# Patient Record
Sex: MALE | Race: WHITE | NOT HISPANIC OR LATINO | Employment: FULL TIME | URBAN - METROPOLITAN AREA
[De-identification: names, ages, dates, MRNs, and addresses within clinical notes are randomized per-mention and may not be internally consistent; named-entity substitution may affect disease eponyms.]

---

## 2023-03-24 ENCOUNTER — DOCUMENTATION (OUTPATIENT)
Dept: CARE COORDINATION | Facility: CLINIC | Age: 58
End: 2023-03-24
Payer: COMMERCIAL

## 2023-03-27 ENCOUNTER — PATIENT OUTREACH (OUTPATIENT)
Dept: CARE COORDINATION | Facility: CLINIC | Age: 58
End: 2023-03-27
Payer: COMMERCIAL

## 2023-03-27 NOTE — PROGRESS NOTES
Called to see if patient would like to reschedule missed zoom milestones visit one last Friday. He apologized as he had forgotten and we rescheduled for this Friday.

## 2023-03-29 ENCOUNTER — TELEMEDICINE (OUTPATIENT)
Dept: PHARMACY | Facility: HOSPITAL | Age: 58
End: 2023-03-29
Payer: COMMERCIAL

## 2023-03-29 DIAGNOSIS — E11.9 TYPE 2 DIABETES MELLITUS WITHOUT COMPLICATION, WITHOUT LONG-TERM CURRENT USE OF INSULIN (MULTI): Primary | ICD-10-CM

## 2023-03-29 DIAGNOSIS — E11.9 TYPE 2 DIABETES MELLITUS WITHOUT COMPLICATION, WITHOUT LONG-TERM CURRENT USE OF INSULIN (MULTI): ICD-10-CM

## 2023-03-29 RX ORDER — NAPROXEN SODIUM 220 MG
220 TABLET ORAL 3 TIMES DAILY PRN
COMMUNITY

## 2023-03-29 RX ORDER — OLMESARTAN MEDOXOMIL AND HYDROCHLOROTHIAZIDE 40/25 40; 25 MG/1; MG/1
1 TABLET ORAL DAILY
COMMUNITY
Start: 2023-03-24 | End: 2023-06-20 | Stop reason: SDUPTHER

## 2023-03-29 RX ORDER — FLASH GLUCOSE SENSOR
KIT MISCELLANEOUS
Qty: 6 EACH | Refills: 1 | Status: SHIPPED | OUTPATIENT
Start: 2023-03-29 | End: 2023-04-25 | Stop reason: SDUPTHER

## 2023-03-29 RX ORDER — METFORMIN HYDROCHLORIDE 500 MG/1
1000 TABLET, EXTENDED RELEASE ORAL
COMMUNITY
Start: 2023-03-21 | End: 2023-05-09 | Stop reason: SDUPTHER

## 2023-03-29 ASSESSMENT — ENCOUNTER SYMPTOMS: DIABETIC ASSOCIATED SYMPTOMS: 0

## 2023-03-29 NOTE — PROGRESS NOTES
Patient is sent at the request of Chanda Munoz DO for my opinion regarding Type 2 diabetes.  My final recommendations will be communicated back to the requesting provider by way of shared medical record.    Subjective   Diabetes  He presents for his follow-up diabetic visit. He has type 2 diabetes mellitus. The initial diagnosis of diabetes was made 5 years ago. His disease course has been improving. There are no hypoglycemic associated symptoms. There are no diabetic associated symptoms. There are no hypoglycemic complications. There are no diabetic complications. Risk factors for coronary artery disease include dyslipidemia, diabetes mellitus, hypertension, male sex and tobacco exposure. He is compliant with treatment all of the time. An ACE inhibitor/angiotensin II receptor blocker is being taken. He does not see a podiatrist.Eye exam is not current.       Allergies   Allergen Reactions    Animal Dander Unknown    Lisinopril Cough     No issues reported in regards to accessibility affordability adherence organization.    Adverse effects: Patient reports he is tolerating Mounjaro fairly well. He gets some stomach upset the few days after he gives his weekly dose on Sunday but this goes away by mid week. He also states his appetite has decreased and he experiences increased satiety. He reports no other adverse effects.     MEDICATION RECONCILIATION  Medication list was confirmed to be accurate by patient, no changes were made.       [facility] - [place], [place] - [address]  [address]  [facility] [address]  Phone: [phone] Fax: [phone]    Current Outpatient Medications on File Prior to Visit   Medication Sig Dispense Refill    cannabidiol, CBD, (CANNABIDIOL ORAL) Take 1 CBD gummy by mouth as needed for pain      cinnamon bark (CINNAMON ORAL) Take 1 tablespoon with breakfast once daily      metFORMIN XR (Glucophage-XR) 500 mg 24 hr tablet Take 2 tablets (1,000 mg) by mouth  once daily in the evening. Take with meals.      naproxen sodium (Aleve) 220 mg tablet Take 1 tablet (220 mg) by mouth 3 times a day as needed for mild pain (1 - 3).      olmesartan-hydrochlorothiazide (BENIcar HCT) 40-25 mg tablet Take 1 tablet by mouth once daily.       No current facility-administered medications on file prior to visit.     Diabetes Pharmacotherapy:    Mounjaro 2.5 mg once weekly   Metformin  mg 2 tablets daily     SECONDARY PREVENTION  - Statin? No  - ACE-I/ARB? Yes - Olmesartan-hydrochlorothiazide 40-25 mg   - Aspirin? No    Current monitoring regimen:   Patient is using: continuous glucose monitor        Any episodes of hypoglycemia? no    Objective     Pertinent PMH Review:  - PMH of Pancreatitis: No  - PMH of Retinopathy: No  - PMH of Urinary Tract Infections: No    Lab Review  Lab Results   Component Value Date    BILITOT 0.6 02/15/2023    CALCIUM 9.2 02/15/2023    CO2 28 02/15/2023     02/15/2023    CREATININE 0.66 02/15/2023    GLUCOSE 271 (H) 02/15/2023    ALKPHOS 89 02/15/2023    K 3.7 02/15/2023    PROT 7.2 02/15/2023     (L) 02/15/2023    AST 17 02/15/2023    ALT 20 02/15/2023    BUN 14 02/15/2023    ANIONGAP 11 02/15/2023    ALBUMIN 4.4 02/15/2023    GFRMALE >90 02/15/2023     Lab Results   Component Value Date    TRIG 167 (H) 02/15/2023    CHOL 232 (H) 02/15/2023    HDL 33.8 (A) 02/15/2023     Lab Results   Component Value Date    HGBA1C 10.9 (A) 02/15/2023    HGBA1C 8.0 04/11/2018     The 10-year ASCVD risk score (Eleuterio KO, et al., 2019) is: 28.6%    Values used to calculate the score:      Age: 58 years      Sex: Male      Is Non- : No      Diabetic: Yes      Tobacco smoker: No      Systolic Blood Pressure: 138 mmHg      Is BP treated: Yes      HDL Cholesterol: 33.8 mg/dL      Total Cholesterol: 232 mg/dL    Health Maintenance:   Foot Exam: Unknown  Eye Exam: Not current, patient states he will call to schedule an appointment  Lipid  Panel: 02/15/2023  Urine Albumin: None    Drug Interactions:  No significant drug-drug interactions exist that require adjustment to therapy    Assessment/Plan     Diagnoses and all orders for this visit:  Type 2 diabetes mellitus without complication, without long-term current use of insulin (CMS/[facility])  -     Follow Up In Clinical Pharmacy  -     FreeStyle Jacqueline sensor system (FreeStyle Jacqueline 2 Sensor) kit; Apply 1 sensor to the back of the arm every 14 days to monitor blood sugar  -     tirzepatide 5 mg/0.5 mL pen injector; Inject 5 mg under the skin 1 (one) time per week.    Type 2 diabetes mellitus, is not at goal but improving as evidenced by his most recent A1c of 10.9% on 02/15/2023 and his time in range from his Jacqueline report at 73% over the last 4 weeks.     RX changes:   CHANGE: Mounjaro 5 mg under the skin once weekly (increased from 2.5 mg once weekly)  CONTINUE: Metformin  mg 2 tablets daily   Education:  blood sugar goals and self-monitoring of blood glucose skills  Compliance at present is estimated to be excellent.   Patient will schedule an appointment for an eye exam   FUTURE CONSIDERATION: Patient's ASCVD 10-year risk estimate is 28.6% which is considered high risk. He would be indicated for at least moderate-intensity statin therapy due to his diagnosis of diabetes and could be considered for high-intensity therapy due to his ASCVD risk estimate.     Pharmacy Follow Up: 1 month, 04/25/23  PCP Follow Up: 05/01/23    Ej Pulido, PharmD   [facility] PGY-1 Pharmacy

## 2023-03-31 ENCOUNTER — TELEPHONE (OUTPATIENT)
Dept: CARE COORDINATION | Facility: CLINIC | Age: 58
End: 2023-03-31
Payer: COMMERCIAL

## 2023-03-31 ENCOUNTER — TELEPHONE (OUTPATIENT)
Dept: PRIMARY CARE | Facility: CLINIC | Age: 58
End: 2023-03-31
Payer: COMMERCIAL

## 2023-03-31 ENCOUNTER — PATIENT OUTREACH (OUTPATIENT)
Dept: CARE COORDINATION | Facility: CLINIC | Age: 58
End: 2023-03-31
Payer: COMMERCIAL

## 2023-03-31 NOTE — TELEPHONE ENCOUNTER
Patient called for Diabetes Milestones initial consult, unable to have consult because he was called into work, rescheduled for next Friday

## 2023-03-31 NOTE — PROGRESS NOTES
Patient NS for Milestones Zoom visit today. Resent link to email and called LVM. Juan called back, apologized, and states he had to work today and will need to reschedule for next Friday. Let him know that is fine and got him rescheduled with new date and time for next Friday.

## 2023-04-04 NOTE — PROGRESS NOTES
I reviewed the progress note and agree with the resident’s findings and plans as written. Case discussed with resident.    Dwayne Raman, PharmD

## 2023-04-07 ENCOUNTER — PATIENT OUTREACH (OUTPATIENT)
Dept: CARE COORDINATION | Facility: CLINIC | Age: 58
End: 2023-04-07
Payer: COMMERCIAL

## 2023-04-07 NOTE — PROGRESS NOTES
Juan was not on our scheduled Milestones call today and reached out to him to see if he was able to join the call. He states he is not able to at this time and we rescheduled for Weds April 19 at 9:30.

## 2023-04-19 ENCOUNTER — PATIENT OUTREACH (OUTPATIENT)
Dept: CARE COORDINATION | Facility: CLINIC | Age: 58
End: 2023-04-19
Payer: COMMERCIAL

## 2023-04-25 ENCOUNTER — TELEMEDICINE (OUTPATIENT)
Dept: PHARMACY | Facility: HOSPITAL | Age: 58
End: 2023-04-25
Payer: COMMERCIAL

## 2023-04-25 DIAGNOSIS — E11.9 TYPE 2 DIABETES MELLITUS WITHOUT COMPLICATION, WITHOUT LONG-TERM CURRENT USE OF INSULIN (MULTI): ICD-10-CM

## 2023-04-25 RX ORDER — FLASH GLUCOSE SENSOR
KIT MISCELLANEOUS
Qty: 2 EACH | Refills: 11 | Status: SHIPPED | OUTPATIENT
Start: 2023-04-25 | End: 2023-06-06 | Stop reason: SDUPTHER

## 2023-04-25 RX ORDER — ROSUVASTATIN CALCIUM 10 MG/1
10 TABLET, COATED ORAL DAILY
Qty: 90 TABLET | Refills: 1 | Status: SHIPPED | OUTPATIENT
Start: 2023-04-25 | End: 2023-04-25

## 2023-04-25 ASSESSMENT — ENCOUNTER SYMPTOMS: DIABETIC ASSOCIATED SYMPTOMS: 0

## 2023-04-25 NOTE — ASSESSMENT & PLAN NOTE
Continue Mounjaro 5mg weekly and metformin 1000mg daily  START rosuvastatin 10mg daily  Continue monitoring blood sugars with Jacqueline device. Call company at [phone] if sensor malfunctions. Try requesting extra adhesives too or can buy some to keep sensor on for full 14 days  Try additional snack or two throughout the day to see if helps with heartburn  A1c due mid-May. Ordered today, instructed to complete when convenient  Repeat lipid panel in ~3 months

## 2023-04-25 NOTE — PROGRESS NOTES
Subjective   Patient ID: Kuldeep Kemp is a 58 y.o. male who presents for Diabetes.  Diabetes  He presents for his follow-up diabetic visit. He has type 2 diabetes mellitus. His disease course has been improving. There are no hypoglycemic associated symptoms. There are no diabetic associated symptoms. Current diabetic treatment includes oral agent (monotherapy) (and Mounjaro). He is compliant with treatment all of the time. He is following a generally healthy diet. He participates in exercise every other day. An ACE inhibitor/angiotensin II receptor blocker is being taken.     -Reporting Jacqueline issue, one of the sensors didn't work. Having trouble keeping sensor on for full 14 days.    -Patient reports tolerating increased Mounjaro pretty well. Having a little bit of heartburn and upset stomach. Heartburn more consistent and patient thinks due to empty stomach more often. Patient reports upset stomach only for a couple days after injection    -Patient reports not currently wearing Jacqueline sensor. Report from 3/16/23-4/12/23 shows TIR 97%, high 3%, average glucose 124mg/dL, and glucose management indicator 6.3%.        Referring Provider: Chanda Munoz, DO     Objective     There were no vitals taken for this visit.     LAB  Lab Results   Component Value Date    BILITOT 0.6 02/15/2023    CALCIUM 9.2 02/15/2023    CO2 28 02/15/2023     02/15/2023    CREATININE 0.66 02/15/2023    GLUCOSE 271 (H) 02/15/2023    ALKPHOS 89 02/15/2023    K 3.7 02/15/2023    PROT 7.2 02/15/2023     (L) 02/15/2023    AST 17 02/15/2023    ALT 20 02/15/2023    BUN 14 02/15/2023    ANIONGAP 11 02/15/2023    ALBUMIN 4.4 02/15/2023    GFRMALE >90 02/15/2023     Lab Results   Component Value Date    TRIG 167 (H) 02/15/2023    CHOL 232 (H) 02/15/2023    HDL 33.8 (A) 02/15/2023     Lab Results   Component Value Date    HGBA1C 10.9 (A) 02/15/2023       Assessment/Plan   Problem List Items Addressed This Visit          Endocrine/Metabolic     Diabetes mellitus (CMS/[facility])     Continue Mounjaro 5mg weekly and metformin 1000mg daily  START rosuvastatin 10mg daily  Continue monitoring blood sugars with Jacqueline device. Call company at [phone] if sensor malfunctions. Try requesting extra adhesives too or can buy some to keep sensor on for full 14 days  Try additional snack or two throughout the day to see if helps with heartburn  A1c due mid-May. Ordered today, instructed to complete when convenient  Repeat lipid panel in ~3 months         Relevant Medications    rosuvastatin (Crestor) 10 mg tablet    FreeStyle Jacqueline sensor system (FreeStyle Jacqueline 2 Sensor) kit    tirzepatide 5 mg/0.5 mL pen injector     PCP follow-up: 5/1/23  Pharmacy follow-up: 5/23/23 (virtual)    Laila Hong PharmD [facility] PGY1 Resident    Verbal consent to manage patient's drug therapy was obtained from the patient. They were informed they may decline to participate or withdraw from participation in pharmacy services at any time.

## 2023-05-01 ENCOUNTER — OFFICE VISIT (OUTPATIENT)
Dept: PRIMARY CARE | Facility: CLINIC | Age: 58
End: 2023-05-01
Payer: COMMERCIAL

## 2023-05-01 VITALS
SYSTOLIC BLOOD PRESSURE: 140 MMHG | DIASTOLIC BLOOD PRESSURE: 80 MMHG | TEMPERATURE: 97.3 F | BODY MASS INDEX: 27.28 KG/M2 | WEIGHT: 169 LBS

## 2023-05-01 DIAGNOSIS — R53.83 OTHER FATIGUE: Primary | ICD-10-CM

## 2023-05-01 DIAGNOSIS — E11.9 TYPE 2 DIABETES MELLITUS WITHOUT COMPLICATION, UNSPECIFIED WHETHER LONG TERM INSULIN USE (MULTI): ICD-10-CM

## 2023-05-01 PROCEDURE — 3046F HEMOGLOBIN A1C LEVEL >9.0%: CPT | Performed by: FAMILY MEDICINE

## 2023-05-01 PROCEDURE — 3079F DIAST BP 80-89 MM HG: CPT | Performed by: FAMILY MEDICINE

## 2023-05-01 PROCEDURE — 99213 OFFICE O/P EST LOW 20 MIN: CPT | Performed by: FAMILY MEDICINE

## 2023-05-01 PROCEDURE — 3077F SYST BP >= 140 MM HG: CPT | Performed by: FAMILY MEDICINE

## 2023-05-01 ASSESSMENT — ENCOUNTER SYMPTOMS
FATIGUE: 1
CARDIOVASCULAR NEGATIVE: 1
RESPIRATORY NEGATIVE: 1
FEVER: 0

## 2023-05-01 NOTE — PROGRESS NOTES
Subjective   Patient ID: Kuldeep Kemp is a 58 y.o. male who presents for No chief complaint on file..    Pt presents for follow up:    DM  Following with pharm  BG is 122 currently  His range, 100 percent of WNL the last 24 hours, this has been the norm x 2 months    Reduced appetite  No diarrhea on Metformin    Feels fatigued  Feels able to do little after work bc of the level of fatigue         Review of Systems   Constitutional:  Positive for fatigue. Negative for fever.   Respiratory: Negative.     Cardiovascular: Negative.        Objective   There were no vitals taken for this visit.    Physical Exam  Constitutional:       Appearance: Normal appearance.   Cardiovascular:      Rate and Rhythm: Normal rate and regular rhythm.      Pulses: Normal pulses.      Heart sounds: Normal heart sounds.   Pulmonary:      Effort: Pulmonary effort is normal.      Breath sounds: Normal breath sounds.   Abdominal:      General: Abdomen is flat. Bowel sounds are normal.      Palpations: Abdomen is soft.   Musculoskeletal:      Right lower leg: No edema.      Left lower leg: No edema.   Skin:     General: Skin is warm and dry.   Neurological:      Mental Status: He is alert.         Assessment/Plan   Diagnoses and all orders for this visit:  Other fatigue  -     Testosterone, total and free; Future  -     Comprehensive metabolic panel; Future  -     Tsh With Reflex To Free T4 If Abnormal; Future  -     CBC; Future  Type 2 diabetes mellitus without complication, unspecified whether long term insulin use (CMS/[facility])  -     Comprehensive metabolic panel; Future    Following with pharm, next appt in May   Follow up appt in 3 months  Advised pt to call sooner with any questions or concerns   Chanda Munoz, DO

## 2023-05-03 ENCOUNTER — LAB (OUTPATIENT)
Dept: LAB | Facility: LAB | Age: 58
End: 2023-05-03
Payer: COMMERCIAL

## 2023-05-03 DIAGNOSIS — E11.9 TYPE 2 DIABETES MELLITUS WITHOUT COMPLICATION, WITHOUT LONG-TERM CURRENT USE OF INSULIN (MULTI): ICD-10-CM

## 2023-05-03 DIAGNOSIS — E11.9 TYPE 2 DIABETES MELLITUS WITHOUT COMPLICATION, UNSPECIFIED WHETHER LONG TERM INSULIN USE (MULTI): ICD-10-CM

## 2023-05-03 DIAGNOSIS — R53.83 OTHER FATIGUE: ICD-10-CM

## 2023-05-03 LAB
ALANINE AMINOTRANSFERASE (SGPT) (U/L) IN SER/PLAS: 15 U/L (ref 10–52)
ALBUMIN (G/DL) IN SER/PLAS: 4.8 G/DL (ref 3.4–5)
ALKALINE PHOSPHATASE (U/L) IN SER/PLAS: 69 U/L (ref 33–120)
ANION GAP IN SER/PLAS: 13 MMOL/L (ref 10–20)
ASPARTATE AMINOTRANSFERASE (SGOT) (U/L) IN SER/PLAS: 16 U/L (ref 9–39)
BILIRUBIN TOTAL (MG/DL) IN SER/PLAS: 0.7 MG/DL (ref 0–1.2)
CALCIUM (MG/DL) IN SER/PLAS: 9.3 MG/DL (ref 8.6–10.3)
CARBON DIOXIDE, TOTAL (MMOL/L) IN SER/PLAS: 25 MMOL/L (ref 21–32)
CHLORIDE (MMOL/L) IN SER/PLAS: 103 MMOL/L (ref 98–107)
CREATININE (MG/DL) IN SER/PLAS: 0.71 MG/DL (ref 0.5–1.3)
ERYTHROCYTE DISTRIBUTION WIDTH (RATIO) BY AUTOMATED COUNT: 12.6 % (ref 11.5–14.5)
ERYTHROCYTE MEAN CORPUSCULAR HEMOGLOBIN CONCENTRATION (G/DL) BY AUTOMATED: 35.3 G/DL (ref 32–36)
ERYTHROCYTE MEAN CORPUSCULAR VOLUME (FL) BY AUTOMATED COUNT: 89 FL (ref 80–100)
ERYTHROCYTES (10*6/UL) IN BLOOD BY AUTOMATED COUNT: 4.69 X10E12/L (ref 4.5–5.9)
ESTIMATED AVERAGE GLUCOSE FOR HBA1C: 137 MG/DL
GFR MALE: >90 ML/MIN/1.73M2
GLUCOSE (MG/DL) IN SER/PLAS: 90 MG/DL (ref 74–99)
HEMATOCRIT (%) IN BLOOD BY AUTOMATED COUNT: 41.7 % (ref 41–52)
HEMOGLOBIN (G/DL) IN BLOOD: 14.7 G/DL (ref 13.5–17.5)
HEMOGLOBIN A1C/HEMOGLOBIN TOTAL IN BLOOD: 6.4 %
LEUKOCYTES (10*3/UL) IN BLOOD BY AUTOMATED COUNT: 6.3 X10E9/L (ref 4.4–11.3)
PLATELETS (10*3/UL) IN BLOOD AUTOMATED COUNT: 302 X10E9/L (ref 150–450)
POTASSIUM (MMOL/L) IN SER/PLAS: 3.4 MMOL/L (ref 3.5–5.3)
PROTEIN TOTAL: 7.4 G/DL (ref 6.4–8.2)
SODIUM (MMOL/L) IN SER/PLAS: 138 MMOL/L (ref 136–145)
THYROTROPIN (MIU/L) IN SER/PLAS BY DETECTION LIMIT <= 0.05 MIU/L: 3.24 MIU/L (ref 0.44–3.98)
UREA NITROGEN (MG/DL) IN SER/PLAS: 13 MG/DL (ref 6–23)

## 2023-05-03 PROCEDURE — 84443 ASSAY THYROID STIM HORMONE: CPT

## 2023-05-03 PROCEDURE — 84403 ASSAY OF TOTAL TESTOSTERONE: CPT

## 2023-05-03 PROCEDURE — 83036 HEMOGLOBIN GLYCOSYLATED A1C: CPT

## 2023-05-03 PROCEDURE — 85027 COMPLETE CBC AUTOMATED: CPT

## 2023-05-03 PROCEDURE — 84402 ASSAY OF FREE TESTOSTERONE: CPT

## 2023-05-03 PROCEDURE — 36415 COLL VENOUS BLD VENIPUNCTURE: CPT

## 2023-05-03 PROCEDURE — 80053 COMPREHEN METABOLIC PANEL: CPT

## 2023-05-04 ENCOUNTER — TELEPHONE (OUTPATIENT)
Dept: PRIMARY CARE | Facility: CLINIC | Age: 58
End: 2023-05-04
Payer: COMMERCIAL

## 2023-05-04 NOTE — TELEPHONE ENCOUNTER
Please congratulate patient, his A1c has decreased to 6.4!  Of note, his potassium is slightly low, I would recc he increase dietary sources of potassium such as spinach and bananas at this time,    Thank you,  Chanda Munoz, DO

## 2023-05-09 ENCOUNTER — OFFICE VISIT (OUTPATIENT)
Dept: PRIMARY CARE | Facility: CLINIC | Age: 58
End: 2023-05-09
Payer: COMMERCIAL

## 2023-05-09 VITALS — SYSTOLIC BLOOD PRESSURE: 140 MMHG | DIASTOLIC BLOOD PRESSURE: 80 MMHG

## 2023-05-09 DIAGNOSIS — H00.011 HORDEOLUM EXTERNUM OF RIGHT UPPER EYELID: ICD-10-CM

## 2023-05-09 DIAGNOSIS — E11.9 TYPE 2 DIABETES MELLITUS WITHOUT COMPLICATION, WITHOUT LONG-TERM CURRENT USE OF INSULIN (MULTI): ICD-10-CM

## 2023-05-09 DIAGNOSIS — L30.9 DERMATITIS: Primary | ICD-10-CM

## 2023-05-09 DIAGNOSIS — K21.9 GASTROESOPHAGEAL REFLUX DISEASE, UNSPECIFIED WHETHER ESOPHAGITIS PRESENT: ICD-10-CM

## 2023-05-09 PROCEDURE — 99214 OFFICE O/P EST MOD 30 MIN: CPT | Performed by: FAMILY MEDICINE

## 2023-05-09 PROCEDURE — 3044F HG A1C LEVEL LT 7.0%: CPT | Performed by: FAMILY MEDICINE

## 2023-05-09 PROCEDURE — 3079F DIAST BP 80-89 MM HG: CPT | Performed by: FAMILY MEDICINE

## 2023-05-09 PROCEDURE — 3077F SYST BP >= 140 MM HG: CPT | Performed by: FAMILY MEDICINE

## 2023-05-09 PROCEDURE — 1036F TOBACCO NON-USER: CPT | Performed by: FAMILY MEDICINE

## 2023-05-09 RX ORDER — METFORMIN HYDROCHLORIDE 500 MG/1
500 TABLET, EXTENDED RELEASE ORAL
Start: 2023-05-09

## 2023-05-09 RX ORDER — TRIAMCINOLONE ACETONIDE 1 MG/G
CREAM TOPICAL
Qty: 30 G | Refills: 0 | Status: SHIPPED | OUTPATIENT
Start: 2023-05-09 | End: 2023-12-14 | Stop reason: SDUPTHER

## 2023-05-09 ASSESSMENT — ENCOUNTER SYMPTOMS
EYE REDNESS: 1
PSYCHIATRIC NEGATIVE: 1
EYE PAIN: 0
ABDOMINAL PAIN: 1
EYE DISCHARGE: 0
EYE ITCHING: 0
CONSTITUTIONAL NEGATIVE: 1
ROS GI COMMENTS: HEARTBURN

## 2023-05-09 NOTE — PROGRESS NOTES
Subjective   Patient ID: Kuldeep Kemp is a 58 y.o. male who presents for Follow-up (Does not want to increase med dose with pharmacy. ) and Rash (X4 days, red bumps, itching. Has tried a cream. Was doing yard work on Saturday. ).    Pt presents for follow up:    Right eye, stye?    Bilateral forearms- red, itchy rash since doing yard work this past weekend    Has heartburn symptoms as well, wonders if this may be the Mounjaro?  Reviewed his improved A1C    Bilateral ankle pain, katrina at end of day  Takes Aleve         Review of Systems   Constitutional: Negative.    Eyes:  Positive for redness. Negative for pain, discharge and itching.        Right eyelid redness   Gastrointestinal:  Positive for abdominal pain.        Heartburn   Skin:  Positive for rash.   Psychiatric/Behavioral: Negative.         Objective   There were no vitals taken for this visit.    Physical Exam  Constitutional:       Appearance: Normal appearance.   Eyes:      General: No allergic shiner or visual field deficit.        Right eye: No foreign body or discharge.      Extraocular Movements: Extraocular movements intact.      Conjunctiva/sclera: Conjunctivae normal.        Comments: Right eyelid, stye appreciated   Central area of white head noted      Cardiovascular:      Rate and Rhythm: Normal rate and regular rhythm.      Heart sounds: Normal heart sounds.   Pulmonary:      Effort: Pulmonary effort is normal.      Breath sounds: Normal breath sounds.   Abdominal:      General: Abdomen is flat. Bowel sounds are normal.      Palpations: Abdomen is soft.   Skin:     Findings: Rash present. Rash is macular and papular. Rash is not pustular.      Comments: Bilateral forearms, red, maculopapular rash    Neurological:      Mental Status: He is alert.         Assessment/Plan   Diagnoses and all orders for this visit:  Dermatitis  -     triamcinolone (Kenalog) 0.1 % cream; Apply to affected area 1-2 times daily as needed. Avoid face and  groin.  Hordeolum externum of right upper eyelid  Type 2 diabetes mellitus without complication, without long-term current use of insulin (CMS/[facility])  -     metFORMIN XR (Glucophage-XR) 500 mg 24 hr tablet; Take 1 tablet (500 mg) by mouth once daily in the evening. Take with meals.  Gastroesophageal reflux disease, unspecified whether esophagitis present  Start Pepcid  Decrease use of Aleve, as this may be a contributing factor       Warm compresses to right eyelid, advised him to call us with any eye pain, or increased swelling and/or new or worsening symptoms     Start Zyrtec or Claritin for the itching    Follow up appt in 3 months  Advised pt to call sooner with any questions or concerns   Chanda Munoz, DO

## 2023-05-11 ENCOUNTER — TELEPHONE (OUTPATIENT)
Dept: PRIMARY CARE | Facility: CLINIC | Age: 58
End: 2023-05-11
Payer: COMMERCIAL

## 2023-05-11 LAB
TESTOSTERONE FREE (CHAN): 56.2 PG/ML (ref 35–155)
TESTOSTERONE,TOTAL,LC-MS/MS: 365 NG/DL (ref 250–1100)

## 2023-05-11 NOTE — TELEPHONE ENCOUNTER
Please notify pt of low/normal testosterone level. I would offer the option of a urology referral, Dr. Cl Arroyo, [phone].     Thank you,  Chanda Munoz, DO

## 2023-05-23 ENCOUNTER — TELEMEDICINE (OUTPATIENT)
Dept: PHARMACY | Facility: HOSPITAL | Age: 58
End: 2023-05-23
Payer: COMMERCIAL

## 2023-05-23 DIAGNOSIS — E11.9 TYPE 2 DIABETES MELLITUS WITHOUT COMPLICATION, WITHOUT LONG-TERM CURRENT USE OF INSULIN (MULTI): Primary | ICD-10-CM

## 2023-05-23 NOTE — PROGRESS NOTES
Subjective   Patient ID: Kuldeep Kemp is a 58 y.o. male who presents for Diabetes medication management.    Referring Provider: Chanda Munoz DO     Employee health diabetes maintenance.       Review of Systems   All other systems reviewed and are negative.      Objective     There were no vitals taken for this visit.     Labs  Lab Results   Component Value Date    BILITOT 0.7 05/03/2023    CALCIUM 9.3 05/03/2023    CO2 25 05/03/2023     05/03/2023    CREATININE 0.71 05/03/2023    GLUCOSE 90 05/03/2023    ALKPHOS 69 05/03/2023    K 3.4 (L) 05/03/2023    PROT 7.4 05/03/2023     05/03/2023    AST 16 05/03/2023    ALT 15 05/03/2023    BUN 13 05/03/2023    ANIONGAP 13 05/03/2023    ALBUMIN 4.8 05/03/2023    GFRMALE >90 05/03/2023     Lab Results   Component Value Date    TRIG 167 (H) 02/15/2023    CHOL 232 (H) 02/15/2023    HDL 33.8 (A) 02/15/2023     Lab Results   Component Value Date    HGBA1C 6.4 (A) 05/03/2023       Current Outpatient Medications on File Prior to Visit   Medication Sig Dispense Refill    cannabidiol, CBD, (CANNABIDIOL ORAL) Take 1 CBD gummy by mouth as needed for pain      cinnamon bark (CINNAMON ORAL) Take 1 tablespoon with breakfast once daily      FreeStyle Jacqueline sensor system (FreeStyle Jacqueline 2 Sensor) kit Apply 1 sensor to the back of the arm every 14 days to monitor blood sugar 2 each 11    metFORMIN XR (Glucophage-XR) 500 mg 24 hr tablet Take 1 tablet (500 mg) by mouth once daily in the evening. Take with meals.      naproxen sodium (Aleve) 220 mg tablet Take 1 tablet (220 mg) by mouth 3 times a day as needed for mild pain (1 - 3).      olmesartan-hydrochlorothiazide (BENIcar HCT) 40-25 mg tablet Take 1 tablet by mouth once daily.      rosuvastatin (Crestor) 10 mg tablet Take 1 tablet (10 mg) by mouth once daily. 90 tablet 1    tirzepatide 5 mg/0.5 mL pen injector Inject 5 mg under the skin 1 (one) time per week. 2 mL 2    triamcinolone (Kenalog) 0.1 % cream Apply to affected  area 1-2 times daily as needed. Avoid face and groin. 30 g 0     No current facility-administered medications on file prior to visit.        Assessment/Plan   Problem List Items Addressed This Visit          Endocrine/Metabolic    Diabetes mellitus (CMS/HCC) - Primary     Patient doing well, no major complaints. Patient's A1c reported to be 6.4% on 5/3/23 and is at goal <7.0%. Patient reports BG has also been at goal between . Patient's provider Dr. Munoz has already begun titrating off his metformin, patient is currently taking 500 mg once daily. Will continue to titrate off as tolerated to BG goals, follow up planned in 2 weeks to assess if metformin can be discontinued.     Patient does report some leg pain, hard to determine if this is musculoskeletal in nature or from statin due to recent exertions. Patient is tolerating pain, he does not state it is significant. Will keep statin at current dose for now, patient has instructions to monitor muscle pain and will reassess in 2 weeks. [facility] started statin at the end of [facility]. Will put in order for lipid panel due around mid July.       PLAN  Continue all meds under the continuation of care with the referring provider and clinical pharmacy team  Continue to monitor BG, will follow up in 2 weeks to assess viability to discontinue metformin  Monitor for rosuvastatin side effects, follow up in 2 weeks to assess              Aidan Pires PharmD    Continue all meds under the continuation of care with the referring provider and clinical pharmacy team.

## 2023-05-23 NOTE — ASSESSMENT & PLAN NOTE
Patient doing well, no major complaints. Patient's A1c reported to be 6.4% on 5/3/23 and is at goal <7.0%. Patient reports BG has also been at goal between . Patient's provider Dr. Munoz has already begun titrating off his metformin, patient is currently taking 500 mg once daily. Will continue to titrate off as tolerated to BG goals, follow up planned in 2 weeks to assess if metformin can be discontinued.     Patient does report some leg pain, hard to determine if this is musculoskeletal in nature or from statin due to recent exertions. Patient is tolerating pain, he does not state it is significant. Will keep statin at current dose for now, patient has instructions to monitor muscle pain and will reassess in 2 weeks. Patient's last lipid panel was back in Feburary, however started statin at the end of April. Will put in order for lipid panel due around mid July.       PLAN  Continue all meds under the continuation of care with the referring provider and clinical pharmacy team  Continue to monitor BG, will follow up in 2 weeks to assess viability to discontinue metformin  Monitor for rosuvastatin side effects, follow up in 2 weeks to assess

## 2023-05-30 DIAGNOSIS — E11.9 TYPE 2 DIABETES MELLITUS WITHOUT COMPLICATION, WITHOUT LONG-TERM CURRENT USE OF INSULIN (MULTI): ICD-10-CM

## 2023-05-31 ENCOUNTER — TELEPHONE (OUTPATIENT)
Dept: PRIMARY CARE | Facility: CLINIC | Age: 58
End: 2023-05-31
Payer: COMMERCIAL

## 2023-05-31 DIAGNOSIS — E11.9 TYPE 2 DIABETES MELLITUS WITHOUT COMPLICATION, WITHOUT LONG-TERM CURRENT USE OF INSULIN (MULTI): ICD-10-CM

## 2023-05-31 NOTE — TELEPHONE ENCOUNTER
Refill(s) requested for: Mounjaro (2.5mg/0.5mL)    Pharmacy:  [facility]  Pharmacy Address: [address] [place]    LR: 03/01/2023  LV: 05/09/2023  NV: 08/01/2023

## 2023-06-06 ENCOUNTER — TELEMEDICINE (OUTPATIENT)
Dept: PHARMACY | Facility: HOSPITAL | Age: 58
End: 2023-06-06
Payer: COMMERCIAL

## 2023-06-06 DIAGNOSIS — E11.9 TYPE 2 DIABETES MELLITUS WITHOUT COMPLICATION, WITHOUT LONG-TERM CURRENT USE OF INSULIN (MULTI): Primary | ICD-10-CM

## 2023-06-06 RX ORDER — FLASH GLUCOSE SENSOR
KIT MISCELLANEOUS
Qty: 2 EACH | Refills: 11 | Status: SHIPPED | OUTPATIENT
Start: 2023-06-06 | End: 2023-08-07 | Stop reason: SDUPTHER

## 2023-06-06 NOTE — ASSESSMENT & PLAN NOTE
Patient doing well, no major complaints since last visit. Patient did not have his FBG readings with him but reports his average was around 120 over the past week. This previous week he used his last CGM sensor, will send more to his pharmacy. Patient stated he has only taken a few doses of his metformin since last visit. Given his average sugars since last visit, will stop metformin for now to see if FBG remains at goal. Will reassess in 2 weeks to determine if metformin is needed.    PLAN  Stop metformin  Continue checking FBG, will send [employer] Jacqueline sensor for patient  Continue all meds under the continuation of care with the referring provider and clinical pharmacy team  Follow up in 2 weeks to assess FBG readings and determine if metformin can remain off

## 2023-06-06 NOTE — PROGRESS NOTES
Subjective   Patient ID: Kuldeep Kemp is a 58 y.o. male who presents for Diabetes medication management.    Referring Provider: Chanda Munoz DO     Employee health diabetes maintenance.       Review of Systems   All other systems reviewed and are negative.      Objective     There were no vitals taken for this visit.     Labs  Lab Results   Component Value Date    BILITOT 0.7 05/03/2023    CALCIUM 9.3 05/03/2023    CO2 25 05/03/2023     05/03/2023    CREATININE 0.71 05/03/2023    GLUCOSE 90 05/03/2023    ALKPHOS 69 05/03/2023    K 3.4 (L) 05/03/2023    PROT 7.4 05/03/2023     05/03/2023    AST 16 05/03/2023    ALT 15 05/03/2023    BUN 13 05/03/2023    ANIONGAP 13 05/03/2023    ALBUMIN 4.8 05/03/2023    GFRMALE >90 05/03/2023     Lab Results   Component Value Date    TRIG 167 (H) 02/15/2023    CHOL 232 (H) 02/15/2023    HDL 33.8 (A) 02/15/2023     Lab Results   Component Value Date    HGBA1C 6.4 (A) 05/03/2023       Current Outpatient Medications on File Prior to Visit   Medication Sig Dispense Refill    cannabidiol, CBD, (CANNABIDIOL ORAL) Take 1 CBD gummy by mouth as needed for pain      cinnamon bark (CINNAMON ORAL) Take 1 tablespoon with breakfast once daily      FreeStyle Jacqueline sensor system (FreeStyle Jacqueline 2 Sensor) kit Apply 1 sensor to the back of the arm every 14 days to monitor blood sugar 2 each 11    metFORMIN XR (Glucophage-XR) 500 mg 24 hr tablet Take 1 tablet (500 mg) by mouth once daily in the evening. Take with meals.      naproxen sodium (Aleve) 220 mg tablet Take 1 tablet (220 mg) by mouth 3 times a day as needed for mild pain (1 - 3).      olmesartan-hydrochlorothiazide (BENIcar HCT) 40-25 mg tablet Take 1 tablet by mouth once daily.      rosuvastatin (Crestor) 10 mg tablet Take 1 tablet (10 mg) by mouth once daily. 90 tablet 1    tirzepatide 5 mg/0.5 mL pen injector Inject 5 mg under the skin 1 (one) time per week. 2 mL 2    triamcinolone (Kenalog) 0.1 % cream Apply to affected  area 1-2 times daily as needed. Avoid face and groin. 30 g 0     No current facility-administered medications on file prior to visit.        Assessment/Plan   Problem List Items Addressed This Visit          Endocrine/Metabolic    Diabetes mellitus (CMS/HCC) - Primary     Patient doing well, no major complaints since last visit. Patient did not have his FBG readings with him but reports his average was around 120 over the past week. This previous week he used his last CGM sensor, will send more to his pharmacy. Patient stated he has only taken a few doses of his metformin since last visit. Given his average sugars since last visit, will stop metformin for now to see if FBG remains at goal. Will reassess in 2 weeks to determine if metformin is needed.    PLAN  Stop metformin  Continue checking FBG, will send Concur Japanyle Jacqueline sensor for patient  Continue all meds under the continuation of care with the referring provider and clinical pharmacy team  Follow up in 2 weeks to assess FBG readings and determine if metformin can remain off          Aidan Pires, PharmD  PGY-1 Pharmacy Resident  [email or web address]   P: [phone]     Continue all meds under the continuation of care with the referring provider and clinical pharmacy team.

## 2023-06-20 ENCOUNTER — TELEMEDICINE (OUTPATIENT)
Dept: PHARMACY | Facility: HOSPITAL | Age: 58
End: 2023-06-20
Payer: COMMERCIAL

## 2023-06-20 ENCOUNTER — APPOINTMENT (OUTPATIENT)
Dept: PHARMACY | Facility: HOSPITAL | Age: 58
End: 2023-06-20
Payer: COMMERCIAL

## 2023-06-20 DIAGNOSIS — E11.9 TYPE 2 DIABETES MELLITUS WITHOUT COMPLICATION, WITHOUT LONG-TERM CURRENT USE OF INSULIN (MULTI): Primary | ICD-10-CM

## 2023-06-20 RX ORDER — OLMESARTAN MEDOXOMIL AND HYDROCHLOROTHIAZIDE 40/25 40; 25 MG/1; MG/1
1 TABLET ORAL DAILY
Qty: 30 TABLET | Refills: 11 | Status: SHIPPED | OUTPATIENT
Start: 2023-06-20 | End: 2023-06-20

## 2023-06-20 NOTE — PROGRESS NOTES
Subjective   Patient ID: Kuldeep Kemp is a 58 y.o. male who presents for Diabetes medication management.    Referring Provider: Chanda Munoz DO     Employee health diabetes maintenance.       Review of Systems   All other systems reviewed and are negative.      Objective     There were no vitals taken for this visit.     Labs  Lab Results   Component Value Date    BILITOT 0.7 05/03/2023    CALCIUM 9.3 05/03/2023    CO2 25 05/03/2023     05/03/2023    CREATININE 0.71 05/03/2023    GLUCOSE 90 05/03/2023    ALKPHOS 69 05/03/2023    K 3.4 (L) 05/03/2023    PROT 7.4 05/03/2023     05/03/2023    AST 16 05/03/2023    ALT 15 05/03/2023    BUN 13 05/03/2023    ANIONGAP 13 05/03/2023    ALBUMIN 4.8 05/03/2023    GFRMALE >90 05/03/2023     Lab Results   Component Value Date    TRIG 167 (H) 02/15/2023    CHOL 232 (H) 02/15/2023    HDL 33.8 (A) 02/15/2023     Lab Results   Component Value Date    HGBA1C 6.4 (A) 05/03/2023       Current Outpatient Medications on File Prior to Visit   Medication Sig Dispense Refill    cannabidiol, CBD, (CANNABIDIOL ORAL) Take 1 CBD gummy by mouth as needed for pain      cinnamon bark (CINNAMON ORAL) Take 1 tablespoon with breakfast once daily      FreeStyle Jacqueline sensor system (FreeStyle Jacqueline 2 Sensor) kit Apply 1 sensor to the back of the arm every 14 days to monitor blood sugar 2 each 11    metFORMIN XR (Glucophage-XR) 500 mg 24 hr tablet Take 1 tablet (500 mg) by mouth once daily in the evening. Take with meals.      naproxen sodium (Aleve) 220 mg tablet Take 1 tablet (220 mg) by mouth 3 times a day as needed for mild pain (1 - 3).      olmesartan-hydrochlorothiazide (BENIcar HCT) 40-25 mg tablet Take 1 tablet by mouth once daily.      rosuvastatin (Crestor) 10 mg tablet Take 1 tablet (10 mg) by mouth once daily. 90 tablet 1    tirzepatide 5 mg/0.5 mL pen injector Inject 5 mg under the skin 1 (one) time per week. 2 mL 2    triamcinolone (Kenalog) 0.1 % cream Apply to affected  area 1-2 times daily as needed. Avoid face and groin. 30 g 0     No current facility-administered medications on file prior to visit.        Assessment/Plan   Problem List Items Addressed This Visit          Endocrine/Metabolic    Diabetes mellitus (CMS/HCC) - Primary     Patient doing well, no major complaints. As discussed at last virtual visit patient stopped metformin for a trial. Patient checked his FBG as requested but did not write the values down, reports his average FBG is within goal of  usually around 110. As patient's FBG is at goal and A1c is at goal <7.0%, will keep metformin off and continue Mounjaro. Patient not interested in continued weight loss, will stay on current dose. Next A1c due in August, will reassess then if metformin is needed or if Mounjaro needs to be increased. Patient stated he is out of his Olmesartan/hydrochlorothiazide, will send refill.     PLAN  Continue medications as prescribed  Schedule A1c for August, will reassess if more medications are needed at that point  Send refill for Olmesartan/hydrochlorothiazide 40/25 mg tablet 1 PO every day as previously prescribed by PCP  Follow up after A1c in August           Aidan Pires, PharmD  PGY-1 Pharmacy Resident  [email or web address]   P: [phone]     Continue all meds under the continuation of care with the referring provider and clinical pharmacy team.

## 2023-06-20 NOTE — PROGRESS NOTES
I reviewed the progress note and agree with the resident’s findings and plans as written. Case discussed with resident.    Stephen Azul, PharmD

## 2023-06-20 NOTE — ASSESSMENT & PLAN NOTE
Patient doing well, no major complaints. As discussed at last virtual visit patient stopped metformin for a trial. Patient checked his FBG as requested but did not write the values down, reports his average FBG is within goal of  usually around 110. As patient's FBG is at goal and A1c is at goal <7.0%, will keep metformin off and continue Mounjaro. Patient not interested in continued weight loss, will stay on current dose. Next A1c due in August, will reassess then if metformin is needed or if Mounjaro needs to be increased. Patient stated he is out of his Olmesartan/hydrochlorothiazide, will send refill.     PLAN  Continue medications as prescribed  Schedule A1c for August, will reassess if more medications are needed at that point  Send refill for Olmesartan/hydrochlorothiazide 40/25 mg tablet 1 PO every day as previously prescribed by PCP  Follow up after A1c in August

## 2023-07-06 DIAGNOSIS — E11.9 TYPE 2 DIABETES MELLITUS WITHOUT COMPLICATION, WITHOUT LONG-TERM CURRENT USE OF INSULIN (MULTI): ICD-10-CM

## 2023-07-06 NOTE — TELEPHONE ENCOUNTER
Patient left a voicemail stating that he has been out of his medication since Sunday and stated that he did not realize that he was going to be out of it.

## 2023-08-01 ENCOUNTER — APPOINTMENT (OUTPATIENT)
Dept: PRIMARY CARE | Facility: CLINIC | Age: 58
End: 2023-08-01
Payer: COMMERCIAL

## 2023-08-01 DIAGNOSIS — E11.9 TYPE 2 DIABETES MELLITUS WITHOUT COMPLICATION, WITHOUT LONG-TERM CURRENT USE OF INSULIN (MULTI): ICD-10-CM

## 2023-08-01 NOTE — TELEPHONE ENCOUNTER
Refill    Tirzepatide 5 mg/0.5ml pen injector, inject 5 mg under the skin 1 time per week    Pharm: [facility]    LR: 7/6/23 2ml 2 refill  LV:  6/20/23  NV: 8/7/23

## 2023-08-07 ENCOUNTER — OFFICE VISIT (OUTPATIENT)
Dept: PRIMARY CARE | Facility: CLINIC | Age: 58
End: 2023-08-07
Payer: COMMERCIAL

## 2023-08-07 VITALS — DIASTOLIC BLOOD PRESSURE: 92 MMHG | WEIGHT: 179 LBS | BODY MASS INDEX: 28.89 KG/M2 | SYSTOLIC BLOOD PRESSURE: 170 MMHG

## 2023-08-07 DIAGNOSIS — E11.9 TYPE 2 DIABETES MELLITUS WITHOUT COMPLICATION, WITHOUT LONG-TERM CURRENT USE OF INSULIN (MULTI): ICD-10-CM

## 2023-08-07 DIAGNOSIS — M54.6 ACUTE RIGHT-SIDED THORACIC BACK PAIN: Primary | ICD-10-CM

## 2023-08-07 DIAGNOSIS — I10 HYPERTENSION, UNSPECIFIED TYPE: ICD-10-CM

## 2023-08-07 PROCEDURE — 99213 OFFICE O/P EST LOW 20 MIN: CPT | Performed by: FAMILY MEDICINE

## 2023-08-07 PROCEDURE — 3080F DIAST BP >= 90 MM HG: CPT | Performed by: FAMILY MEDICINE

## 2023-08-07 PROCEDURE — 3044F HG A1C LEVEL LT 7.0%: CPT | Performed by: FAMILY MEDICINE

## 2023-08-07 PROCEDURE — 3077F SYST BP >= 140 MM HG: CPT | Performed by: FAMILY MEDICINE

## 2023-08-07 PROCEDURE — 1036F TOBACCO NON-USER: CPT | Performed by: FAMILY MEDICINE

## 2023-08-07 RX ORDER — FLASH GLUCOSE SENSOR
KIT MISCELLANEOUS
Qty: 2 EACH | Refills: 11 | Status: SHIPPED | OUTPATIENT
Start: 2023-08-07 | End: 2023-08-07

## 2023-08-07 RX ORDER — MELOXICAM 7.5 MG/1
7.5 TABLET ORAL DAILY
Qty: 20 TABLET | Refills: 1 | Status: SHIPPED | OUTPATIENT
Start: 2023-08-07 | End: 2023-08-07

## 2023-08-07 NOTE — PROGRESS NOTES
Subjective   Patient ID: Kuldeep Kemp is a 58 y.o. male who presents for Follow-up (Hurt his back at work last week Tuesday ).    Pt presents for follow up:     DM  Following with pharm    Mid Back Pain  X one week  No radiation down arms or legs   No loss of bowel or bladder function  This has occurred in the past  This time he was lifting a shelf and noted it         Review of Systems   Constitutional: Negative.    Musculoskeletal:  Positive for back pain.       Objective   BP (!) 170/92   Wt 81.2 kg (179 lb)   BMI 28.89 kg/m²     Physical Exam  Vitals and nursing note reviewed.   Constitutional:       Appearance: Normal appearance.   Cardiovascular:      Rate and Rhythm: Regular rhythm.      Pulses: Normal pulses.      Heart sounds: Normal heart sounds.   Pulmonary:      Effort: Pulmonary effort is normal.      Breath sounds: Normal breath sounds.   Musculoskeletal:      Thoracic back: Spasms and tenderness present. Decreased range of motion.      Lumbar back: Spasms and tenderness present.   Neurological:      General: No focal deficit present.      Mental Status: He is alert. Mental status is at baseline.      Gait: Gait normal.      Deep Tendon Reflexes: Reflexes normal.      Comments: MS 5/5 bilateral UE and LE         Assessment/Plan   Diagnoses and all orders for this visit:  Acute right-sided thoracic back pain  -     meloxicam (Mobic) 7.5 mg tablet; Take 1 tablet (7.5 mg) by mouth once daily.  -     Referral to Physical Therapy; Future  Type 2 diabetes mellitus without complication, without long-term current use of insulin (CMS/[facility])  -     FreeStyle Jacqueline sensor system (FreeStyle Jacqueline 2 Sensor) kit; Apply 1 sensor to the back of the arm every 14 days to monitor blood sugar  Hypertension, unspecified type    Following w/ pharm for his DM, has a follow up appt next week    BP is elevated in the office today, of note pt is in pain from his back   Will monitor BP as an outpatient    Chanda Munoz, DO

## 2023-08-08 ASSESSMENT — ENCOUNTER SYMPTOMS
BACK PAIN: 1
CONSTITUTIONAL NEGATIVE: 1

## 2023-08-15 ENCOUNTER — TELEMEDICINE (OUTPATIENT)
Dept: PHARMACY | Facility: HOSPITAL | Age: 58
End: 2023-08-15
Payer: COMMERCIAL

## 2023-08-15 DIAGNOSIS — E11.9 TYPE 2 DIABETES MELLITUS WITHOUT COMPLICATION, WITHOUT LONG-TERM CURRENT USE OF INSULIN (MULTI): Primary | ICD-10-CM

## 2023-08-15 NOTE — PROGRESS NOTES
I reviewed the progress note and agree with the resident’s findings and plans as written. Case discussed with resident.    Nii Campos, TarunD

## 2023-08-15 NOTE — PROGRESS NOTES
Kuldeep Kemp is a 58 y.o. male was referred to Clinical Pharmacy Team to complete a comprehensive medication review (CMR) with a pharmacist as part of the [employer] diabetes program.  The patient was referred for their Diabetes At last visit, no medication changes were made pending new A1c.    Referring Provider: Chanda Munoz, DO    Subjective   Allergies   Allergen Reactions    Animal Dander Unknown    Cat Dander Unknown    Lisinopril Cough       [facility] - [place], [place] - [address]  [address]  [address]  [facility] [address]  Phone: [phone] Fax: [phone]      Diabetes  He presents for his follow-up diabetic visit. He has type 2 diabetes mellitus. His disease course has been stable. There are no hypoglycemic associated symptoms. There are no hypoglycemic complications.       Objective     There were no vitals taken for this visit.     LAB  Lab Results   Component Value Date    BILITOT 0.7 05/03/2023    CALCIUM 9.3 05/03/2023    CO2 25 05/03/2023     05/03/2023    CREATININE 0.71 05/03/2023    GLUCOSE 90 05/03/2023    ALKPHOS 69 05/03/2023    K 3.4 (L) 05/03/2023    PROT 7.4 05/03/2023     05/03/2023    AST 16 05/03/2023    ALT 15 05/03/2023    BUN 13 05/03/2023    ANIONGAP 13 05/03/2023    ALBUMIN 4.8 05/03/2023    GFRMALE >90 05/03/2023     Lab Results   Component Value Date    TRIG 167 (H) 02/15/2023    CHOL 232 (H) 02/15/2023    HDL 33.8 (A) 02/15/2023     Lab Results   Component Value Date    HGBA1C 6.4 (A) 05/03/2023       Current Outpatient Medications on File Prior to Visit   Medication Sig Dispense Refill    cannabidiol, CBD, (CANNABIDIOL ORAL) Take 1 CBD gummy by mouth as needed for pain      cinnamon bark (CINNAMON ORAL) Take 1 tablespoon with breakfast once daily      FreeStyle Jacqueline sensor system (FreeStyle Jacqueline 2 Sensor) kit Apply 1 sensor to the back of the arm every 14 days to monitor blood sugar 2 each 11    meloxicam (Mobic) 7.5  mg tablet Take 1 tablet (7.5 mg) by mouth once daily. 20 tablet 1    metFORMIN XR (Glucophage-XR) 500 mg 24 hr tablet Take 1 tablet (500 mg) by mouth once daily in the evening. Take with meals.      naproxen sodium (Aleve) 220 mg tablet Take 1 tablet (220 mg) by mouth 3 times a day as needed for mild pain (1 - 3).      olmesartan-hydrochlorothiazide (BENIcar HCT) 40-25 mg tablet Take 1 tablet by mouth once daily. 30 tablet 11    rosuvastatin (Crestor) 10 mg tablet Take 1 tablet (10 mg) by mouth once daily. 90 tablet 1    tirzepatide 5 mg/0.5 mL pen injector Inject 5 mg under the skin 1 (one) time per week. 2 mL 2    triamcinolone (Kenalog) 0.1 % cream Apply to affected area 1-2 times daily as needed. Avoid face and groin. 30 g 0     No current facility-administered medications on file prior to visit.     Current Diabetes Pharmacotherapy  Mounjaro 5mg subcutaneously once weekly  Metformin XR 500mg once daily    Since Last Visit:  Patient resumed taking Metformin XR 500mg once daily on his PCP's recommendation    Home SMBG:  Overall average blood glucose 130's   Last 24 hour average: 118  Last 24 hour in target 100% of the time      SECONDARY PREVENTION  - Statin? yes; rosuvastatin 10mg  - ACE-I/ARB? yes; olmesartan-hydrochlorothiazide 40-25mg    Assessment/Plan   Problem List Items Addressed This Visit       Diabetes mellitus (CMS/HCC) - Primary     Patient is doing well and has no major complaints at this time. A1c is below goal at 6.4% (5/3/2023) (goal <7%).     Plan:  Continue medications as prescribed  Get updated A1c and lipid panel prior to next visit  Follow-up in September after labwork    Patient requested to have his medications delivered moving forward           Patient is amenable to increasing rosuvastatin, but wants to wait until he has updated lab work. Plan to increase rosuvastatin to 20mg at next visit.       Follow up: 9/12/2023 @9am    Yadira Guerrero, PharmD  PGY1  [facility]  Resident    Verbal consent to manage patient's drug therapy was obtained from the patient. They were informed they may decline to participate or withdraw from participation in pharmacy services at any time.

## 2023-08-15 NOTE — ASSESSMENT & PLAN NOTE
Patient is doing well and has no major complaints at this time. A1c is below goal at 6.4% (5/3/2023) (goal <7%).     Plan:  Continue medications as prescribed  Get updated A1c and lipid panel prior to next visit  Follow-up in September after labwork    Patient requested to have his medications delivered moving forward

## 2023-08-19 DIAGNOSIS — Z20.822 CLOSE EXPOSURE TO COVID-19 VIRUS: Primary | ICD-10-CM

## 2023-09-12 ENCOUNTER — APPOINTMENT (OUTPATIENT)
Dept: PHARMACY | Facility: HOSPITAL | Age: 58
End: 2023-09-12
Payer: COMMERCIAL

## 2023-09-19 ENCOUNTER — TELEMEDICINE (OUTPATIENT)
Dept: PHARMACY | Facility: HOSPITAL | Age: 58
End: 2023-09-19
Payer: COMMERCIAL

## 2023-09-19 DIAGNOSIS — E11.9 TYPE 2 DIABETES MELLITUS WITHOUT COMPLICATION, WITHOUT LONG-TERM CURRENT USE OF INSULIN (MULTI): Primary | ICD-10-CM

## 2023-09-19 ASSESSMENT — ENCOUNTER SYMPTOMS
BLURRED VISION: 0
POLYPHAGIA: 0
DIABETIC ASSOCIATED SYMPTOMS: 0
POLYDIPSIA: 0

## 2023-09-19 NOTE — ASSESSMENT & PLAN NOTE
RECOMMENDATIONS/PLAN  Patients diabetes is well controlled and stable with most recent A1c of 6.4% (5/3/2023) (goal < 7 %).   Continue all meds under the continuation of care with the referring provider and clinical pharmacy team.  Patient's diabetes is stable, and has needed no medication changes. Will follow up in 10 months for VBID re-enrollment

## 2023-09-19 NOTE — PROGRESS NOTES
Subjective   Patient ID: Kuldeep Kemp is a 58 y.o. male who was referred to Clinical Pharmacy Team to complete a comprehensive medication review (CMR) with a pharmacist as part of the [employer] diabetes program.  The patient was referred for their Diabetes At last visit, no medication changes were made pending new A1c and lipid labs.     Referring Provider: Chanda Munoz, DO     Diabetes  He presents for his follow-up diabetic visit. He has type 2 diabetes mellitus. His disease course has been stable. There are no hypoglycemic associated symptoms. There are no diabetic associated symptoms. Pertinent negatives for diabetes include no blurred vision, no polydipsia, no polyphagia and no polyuria. There are no hypoglycemic complications.     DIABETES ASSESSMENT    CURRENT PHARMACOTHERAPY  Metformin XR 500mg once daily  Mounjaro 5mg subcutaneously once weekly    SECONDARY PREVENTION  Statin? Yes; Rosuvastatin 10mg  ACE-I/ARB? Yes; Olmesartan-hydrochlorothiazide 40-25mg  Aspirin? No    Affordability/Accessibility: no issues reported   Adherence/Organization: no issues reported    SMBG MEASUREMENTS  Patient reports he has gotten very busy and has not wearing the randolph lately  Not checking blood sugar at all currently    Patient does not report symptoms of hypoglycemia. Patient denies confusion, dizziness, headache, hunger, and sweating.  Patient does not report symptoms of hyperglycemia. Patient denies blurry vision, excessive thirst, fatigue, and polyuria.    SIDE EFFECTS  None reported, patient denies GI upset, constipation, and nausea    DIET  No changes reported, however is doing more snacking      EXERCISE  Walks every day       Objective     There were no vitals taken for this visit.     Labs  Lab Results   Component Value Date    BILITOT 0.7 05/03/2023    CALCIUM 9.3 05/03/2023    CO2 25 05/03/2023     05/03/2023    CREATININE 0.71 05/03/2023    GLUCOSE 90 05/03/2023    ALKPHOS 69  05/03/2023    K 3.4 (L) 05/03/2023    PROT 7.4 05/03/2023     05/03/2023    AST 16 05/03/2023    ALT 15 05/03/2023    BUN 13 05/03/2023    ANIONGAP 13 05/03/2023    ALBUMIN 4.8 05/03/2023    GFRMALE >90 05/03/2023     Lab Results   Component Value Date    TRIG 167 (H) 02/15/2023    CHOL 232 (H) 02/15/2023    HDL 33.8 (A) 02/15/2023     Lab Results   Component Value Date    HGBA1C 6.4 (A) 05/03/2023       Current Outpatient Medications on File Prior to Visit   Medication Sig Dispense Refill    cannabidiol, CBD, (CANNABIDIOL ORAL) Take 1 CBD gummy by mouth as needed for pain      cinnamon bark (CINNAMON ORAL) Take 1 tablespoon with breakfast once daily      FreeStyle Jacqueline sensor system (FreeStyle Jacqueline 2 Sensor) kit Apply 1 sensor to the back of the arm every 14 days to monitor blood sugar 2 each 11    meloxicam (Mobic) 7.5 mg tablet Take 1 tablet (7.5 mg) by mouth once daily. 20 tablet 1    metFORMIN XR (Glucophage-XR) 500 mg 24 hr tablet Take 1 tablet (500 mg) by mouth once daily in the evening. Take with meals.      naproxen sodium (Aleve) 220 mg tablet Take 1 tablet (220 mg) by mouth 3 times a day as needed for mild pain (1 - 3).      olmesartan-hydrochlorothiazide (BENIcar HCT) 40-25 mg tablet Take 1 tablet by mouth once daily. 30 tablet 11    rosuvastatin (Crestor) 10 mg tablet Take 1 tablet (10 mg) by mouth once daily. 90 tablet 1    tirzepatide 5 mg/0.5 mL pen injector Inject 5 mg under the skin 1 (one) time per week. 2 mL 2    triamcinolone (Kenalog) 0.1 % cream Apply to affected area 1-2 times daily as needed. Avoid face and groin. 30 g 0     No current facility-administered medications on file prior to visit.        Assessment/Plan   Problem List Items Addressed This Visit       Diabetes mellitus (CMS/[facility]) - Primary     RECOMMENDATIONS/PLAN  Patients diabetes is well controlled and stable with most recent A1c of 6.4% (5/3/2023) (goal < 7 %).   Continue all meds under the continuation of care with the  referring provider and clinical pharmacy team.  Patient's diabetes is stable, and has needed no medication changes. Will follow up in 10 months for VBID re-enrollment            Clinical pharmacy follow-up: 10 months for VBID re-enrollment    Yadira Guerrero, PharmD  PGY1 [facility] Resident     Continue all meds under the continuation of care with the referring provider and clinical pharmacy team.  Verbal consent to manage patient's drug therapy was obtained from patient. They were informed they may decline to participate or withdraw from participation in pharmacy services at any time.

## 2023-10-07 ENCOUNTER — PHARMACY VISIT (OUTPATIENT)
Dept: PHARMACY | Facility: CLINIC | Age: 58
End: 2023-10-07
Payer: COMMERCIAL

## 2023-10-07 PROCEDURE — RXMED WILLOW AMBULATORY MEDICATION CHARGE

## 2023-11-08 ENCOUNTER — PHARMACY VISIT (OUTPATIENT)
Dept: PHARMACY | Facility: CLINIC | Age: 58
End: 2023-11-08
Payer: COMMERCIAL

## 2023-11-08 DIAGNOSIS — E11.9 TYPE 2 DIABETES MELLITUS WITHOUT COMPLICATION, WITHOUT LONG-TERM CURRENT USE OF INSULIN (MULTI): ICD-10-CM

## 2023-11-08 PROCEDURE — RXMED WILLOW AMBULATORY MEDICATION CHARGE

## 2023-11-09 RX ORDER — ROSUVASTATIN CALCIUM 10 MG/1
10 TABLET, COATED ORAL
Qty: 90 TABLET | Refills: 1 | OUTPATIENT
Start: 2023-11-09 | End: 2024-11-08

## 2023-11-09 NOTE — TELEPHONE ENCOUNTER
Not appropriate to refill by Pharmacy Team. Not followed regularly by pharmacy. PCP to address refills.

## 2023-11-27 ENCOUNTER — PHARMACY VISIT (OUTPATIENT)
Dept: PHARMACY | Facility: CLINIC | Age: 58
End: 2023-11-27
Payer: COMMERCIAL

## 2023-11-27 ENCOUNTER — LAB (OUTPATIENT)
Dept: LAB | Facility: LAB | Age: 58
End: 2023-11-27
Payer: COMMERCIAL

## 2023-11-27 DIAGNOSIS — E11.9 TYPE 2 DIABETES MELLITUS WITHOUT COMPLICATION, WITHOUT LONG-TERM CURRENT USE OF INSULIN (MULTI): ICD-10-CM

## 2023-11-27 LAB
CHOLEST SERPL-MCNC: 184 MG/DL (ref 0–199)
CHOLESTEROL/HDL RATIO: 5.7
EST. AVERAGE GLUCOSE BLD GHB EST-MCNC: 114 MG/DL
HBA1C MFR BLD: 5.6 %
HDLC SERPL-MCNC: 32.3 MG/DL
LDLC SERPL CALC-MCNC: 106 MG/DL
NON HDL CHOLESTEROL: 152 MG/DL (ref 0–149)
TRIGL SERPL-MCNC: 231 MG/DL (ref 0–149)
VLDL: 46 MG/DL (ref 0–40)

## 2023-11-27 PROCEDURE — 36415 COLL VENOUS BLD VENIPUNCTURE: CPT

## 2023-11-27 PROCEDURE — RXMED WILLOW AMBULATORY MEDICATION CHARGE

## 2023-11-27 PROCEDURE — 80061 LIPID PANEL: CPT

## 2023-11-27 PROCEDURE — 83036 HEMOGLOBIN GLYCOSYLATED A1C: CPT

## 2023-11-28 RX ORDER — ROSUVASTATIN CALCIUM 10 MG/1
10 TABLET, COATED ORAL
Qty: 90 TABLET | Refills: 1 | OUTPATIENT
Start: 2023-11-28 | End: 2024-11-27

## 2023-11-28 NOTE — TELEPHONE ENCOUNTER
Patient to request refill at upcoming PCP appointment 11/30/23. Has updated lipid panel so pharmacy will defer refill in case PCP determines therapy changes appropriate.

## 2023-11-30 ENCOUNTER — OFFICE VISIT (OUTPATIENT)
Dept: PRIMARY CARE | Facility: CLINIC | Age: 58
End: 2023-11-30
Payer: COMMERCIAL

## 2023-11-30 ENCOUNTER — PHARMACY VISIT (OUTPATIENT)
Dept: PHARMACY | Facility: CLINIC | Age: 58
End: 2023-11-30
Payer: COMMERCIAL

## 2023-11-30 VITALS
DIASTOLIC BLOOD PRESSURE: 80 MMHG | HEART RATE: 80 BPM | BODY MASS INDEX: 28.89 KG/M2 | TEMPERATURE: 98.5 F | SYSTOLIC BLOOD PRESSURE: 162 MMHG | WEIGHT: 179 LBS

## 2023-11-30 DIAGNOSIS — E11.9 TYPE 2 DIABETES MELLITUS WITHOUT COMPLICATION, WITHOUT LONG-TERM CURRENT USE OF INSULIN (MULTI): ICD-10-CM

## 2023-11-30 DIAGNOSIS — R55 SYNCOPE, UNSPECIFIED SYNCOPE TYPE: Primary | ICD-10-CM

## 2023-11-30 PROCEDURE — 99214 OFFICE O/P EST MOD 30 MIN: CPT | Performed by: FAMILY MEDICINE

## 2023-11-30 PROCEDURE — 3079F DIAST BP 80-89 MM HG: CPT | Performed by: FAMILY MEDICINE

## 2023-11-30 PROCEDURE — 3049F LDL-C 100-129 MG/DL: CPT | Performed by: FAMILY MEDICINE

## 2023-11-30 PROCEDURE — RXMED WILLOW AMBULATORY MEDICATION CHARGE

## 2023-11-30 PROCEDURE — 3044F HG A1C LEVEL LT 7.0%: CPT | Performed by: FAMILY MEDICINE

## 2023-11-30 PROCEDURE — 3077F SYST BP >= 140 MM HG: CPT | Performed by: FAMILY MEDICINE

## 2023-11-30 PROCEDURE — 1036F TOBACCO NON-USER: CPT | Performed by: FAMILY MEDICINE

## 2023-11-30 RX ORDER — ROSUVASTATIN CALCIUM 10 MG/1
10 TABLET, COATED ORAL
Qty: 90 TABLET | Refills: 3 | Status: SHIPPED | OUTPATIENT
Start: 2023-11-30 | End: 2024-11-29

## 2023-11-30 ASSESSMENT — ENCOUNTER SYMPTOMS
CONSTITUTIONAL NEGATIVE: 1
RESPIRATORY NEGATIVE: 1
CARDIOVASCULAR NEGATIVE: 1

## 2023-11-30 NOTE — PROGRESS NOTES
Subjective   Patient ID: Kuldeep Kemp is a 58 y.o. male who presents for Dizziness.    Pt presents for dizziness  He was out doing yard work Saturday and passed out  He was drinking a beer and fell and hit his elbow    He felt fine prior he was relaxed and enjoying working in his yard       They did not call an ambulance  His blood glucose was 86  He felt fine immediately after, he has felt fine since  He vaped THC that evening as well       He has neck pain from arthritis  He feels tired          Review of Systems   Constitutional: Negative.    Respiratory: Negative.     Cardiovascular: Negative.        Objective   /80 (BP Location: Right arm, Patient Position: Sitting)   Pulse 80   Temp 36.9 °C (98.5 °F)   Wt 81.2 kg (179 lb)   BMI 28.89 kg/m²     Physical Exam  Vitals and nursing note reviewed.   Constitutional:       Appearance: Normal appearance.   Cardiovascular:      Rate and Rhythm: Normal rate and regular rhythm.      Heart sounds: Normal heart sounds.   Pulmonary:      Effort: Pulmonary effort is normal.      Breath sounds: Normal breath sounds.   Abdominal:      General: Bowel sounds are normal.      Palpations: Abdomen is soft.   Neurological:      Mental Status: He is alert.   Psychiatric:         Mood and Affect: Mood normal.         Thought Content: Thought content normal.         Judgment: Judgment normal.         Assessment/Plan   Diagnoses and all orders for this visit:  Syncope, unspecified syncope type  -     Referral to Cardiology; Future  Type 2 diabetes mellitus without complication, without long-term current use of insulin (CMS/[facility])  -     rosuvastatin (Crestor) 10 mg tablet; TAKE 1 TABLET (10 MG) BY MOUTH ONCE DAILY.  -     Referral to Cardiology; Future    Suspect vaso vagal syncope, recc cardiac eval to rule out other causes   Recc cardiology eval  Advised him to go to the ED should this occur again    Chanda Munoz DO

## 2023-12-06 ENCOUNTER — OFFICE VISIT (OUTPATIENT)
Dept: CARDIOLOGY | Facility: CLINIC | Age: 58
End: 2023-12-06
Payer: COMMERCIAL

## 2023-12-06 VITALS
RESPIRATION RATE: 18 BRPM | DIASTOLIC BLOOD PRESSURE: 78 MMHG | WEIGHT: 177.6 LBS | OXYGEN SATURATION: 98 % | SYSTOLIC BLOOD PRESSURE: 142 MMHG | HEART RATE: 67 BPM | BODY MASS INDEX: 28.54 KG/M2 | HEIGHT: 66 IN

## 2023-12-06 DIAGNOSIS — I10 PRIMARY HYPERTENSION: ICD-10-CM

## 2023-12-06 DIAGNOSIS — R42 DIZZINESS: ICD-10-CM

## 2023-12-06 DIAGNOSIS — E11.9 TYPE 2 DIABETES MELLITUS WITHOUT COMPLICATION, WITHOUT LONG-TERM CURRENT USE OF INSULIN (MULTI): ICD-10-CM

## 2023-12-06 DIAGNOSIS — E72.89 DLD (DIHYDROLIPOAMIDE DEHYDROGENASE DEFICIENCY) (MULTI): ICD-10-CM

## 2023-12-06 DIAGNOSIS — R55 SYNCOPE, UNSPECIFIED SYNCOPE TYPE: Primary | ICD-10-CM

## 2023-12-06 PROBLEM — E03.8 SUBCLINICAL HYPOTHYROIDISM: Status: ACTIVE | Noted: 2023-12-06

## 2023-12-06 PROBLEM — R04.2 HEMOPTYSIS: Status: ACTIVE | Noted: 2023-12-06

## 2023-12-06 PROBLEM — E78.5 HYPERLIPIDEMIA: Status: ACTIVE | Noted: 2023-12-06

## 2023-12-06 PROBLEM — M25.569 KNEE PAIN: Status: ACTIVE | Noted: 2023-12-06

## 2023-12-06 PROBLEM — M25.529 JOINT PAIN, ELBOW: Status: ACTIVE | Noted: 2023-12-06

## 2023-12-06 PROBLEM — L30.9 DERMATITIS: Status: ACTIVE | Noted: 2023-12-06

## 2023-12-06 PROBLEM — L40.9 PSORIASIS: Status: ACTIVE | Noted: 2023-12-06

## 2023-12-06 PROBLEM — K76.0 FATTY LIVER: Status: ACTIVE | Noted: 2023-12-06

## 2023-12-06 PROBLEM — E53.8 VITAMIN B12 DEFICIENCY: Status: ACTIVE | Noted: 2023-12-06

## 2023-12-06 PROBLEM — G47.33 OBSTRUCTIVE SLEEP APNEA: Status: ACTIVE | Noted: 2023-12-06

## 2023-12-06 PROBLEM — F17.200 TOBACCO DEPENDENCE: Status: ACTIVE | Noted: 2023-12-06

## 2023-12-06 PROBLEM — S46.919A SHOULDER STRAIN: Status: ACTIVE | Noted: 2023-12-06

## 2023-12-06 PROBLEM — M25.50 ARTHRALGIA: Status: ACTIVE | Noted: 2023-12-06

## 2023-12-06 PROBLEM — K22.70 BARRETT'S ESOPHAGUS: Status: ACTIVE | Noted: 2023-12-06

## 2023-12-06 PROBLEM — R93.1 ABNORMAL CT SCAN OF HEART: Status: ACTIVE | Noted: 2023-12-06

## 2023-12-06 PROBLEM — M19.012 ARTHRITIS OF LEFT SHOULDER REGION: Status: ACTIVE | Noted: 2023-12-06

## 2023-12-06 PROBLEM — R07.89 ATYPICAL CHEST PAIN: Status: ACTIVE | Noted: 2023-12-06

## 2023-12-06 PROBLEM — E11.65 TYPE 2 DIABETES MELLITUS WITH HYPERGLYCEMIA (MULTI): Status: ACTIVE | Noted: 2023-12-06

## 2023-12-06 PROBLEM — M19.90 ARTHRITIS: Status: ACTIVE | Noted: 2023-12-06

## 2023-12-06 PROBLEM — D64.9 ANEMIA: Status: ACTIVE | Noted: 2023-12-06

## 2023-12-06 PROBLEM — R29.818 SUSPECTED SLEEP APNEA: Status: ACTIVE | Noted: 2023-12-06

## 2023-12-06 PROBLEM — K21.9 ESOPHAGEAL REFLUX: Status: ACTIVE | Noted: 2023-12-06

## 2023-12-06 PROBLEM — M25.512 LEFT SHOULDER PAIN: Status: ACTIVE | Noted: 2023-12-06

## 2023-12-06 PROBLEM — E66.3 OVERWEIGHT: Status: ACTIVE | Noted: 2023-12-06

## 2023-12-06 PROBLEM — M54.12 CERVICAL RADICULITIS: Status: ACTIVE | Noted: 2023-12-06

## 2023-12-06 PROBLEM — J30.9 ALLERGIC RHINITIS: Status: ACTIVE | Noted: 2023-12-06

## 2023-12-06 PROBLEM — E78.1 HYPERTRIGLYCERIDEMIA: Status: ACTIVE | Noted: 2023-12-06

## 2023-12-06 PROBLEM — R59.9 REACTIVE LYMPHADENOPATHY: Status: ACTIVE | Noted: 2023-12-06

## 2023-12-06 PROBLEM — M54.2 NECK PAIN: Status: ACTIVE | Noted: 2023-12-06

## 2023-12-06 PROBLEM — L40.50 PSORIATIC ARTHROPATHY (MULTI): Status: ACTIVE | Noted: 2023-12-06

## 2023-12-06 PROCEDURE — 99204 OFFICE O/P NEW MOD 45 MIN: CPT | Performed by: NURSE PRACTITIONER

## 2023-12-06 PROCEDURE — 3044F HG A1C LEVEL LT 7.0%: CPT | Performed by: NURSE PRACTITIONER

## 2023-12-06 PROCEDURE — 3078F DIAST BP <80 MM HG: CPT | Performed by: NURSE PRACTITIONER

## 2023-12-06 PROCEDURE — 3077F SYST BP >= 140 MM HG: CPT | Performed by: NURSE PRACTITIONER

## 2023-12-06 PROCEDURE — 1036F TOBACCO NON-USER: CPT | Performed by: NURSE PRACTITIONER

## 2023-12-06 PROCEDURE — 3049F LDL-C 100-129 MG/DL: CPT | Performed by: NURSE PRACTITIONER

## 2023-12-06 PROCEDURE — 93000 ELECTROCARDIOGRAM COMPLETE: CPT | Performed by: NURSE PRACTITIONER

## 2023-12-06 RX ORDER — ASCORBIC ACID 1000 MG
175 TABLET ORAL DAILY
COMMUNITY

## 2023-12-06 RX ORDER — ASPIRIN 81 MG/1
81 TABLET ORAL DAILY
COMMUNITY

## 2023-12-06 RX ORDER — VIT C/E/ZN/COPPR/LUTEIN/ZEAXAN 250MG-90MG
25 CAPSULE ORAL DAILY
COMMUNITY

## 2023-12-06 RX ORDER — ASCORBIC ACID 500 MG
500 TABLET ORAL DAILY
COMMUNITY

## 2023-12-06 RX ORDER — GLUCOSAMINE/CHONDRO SU A 500-400 MG
1 TABLET ORAL 3 TIMES DAILY
COMMUNITY

## 2023-12-06 NOTE — PATIENT INSTRUCTIONS
- Echocardiogram (ultrasound of your heart) to assess the function as well as for any valve abnormalities  - low cholesterol diet; mediterranean diet  - phone call follow up after to review results    It was my pleasure to meet you. I look forward to being your cardiac Nurse Practitioner. I am a huge believer in communicating with my patients. Please contact me at any time, if anything is not clear to you regarding anything we have discussed, or if new questions occur to you.

## 2023-12-06 NOTE — PROGRESS NOTES
Name : Kuldeep Kemp   : 1965   MRN : [id number]   ENC Date : 2023    CC:   Dizziness and Syncope     HPI:    Kuldeep Kemp is a 58 y.o. male with PMHx sig HTN, HLD & DMII presenting with c/o syncope.    Thanks weekend he was getting off his tractor & emptied the bag full of leaves. Had done it for 3hrs, event went over to start helping his neighbor. Got dizzy, reached for the [place] seat, unsure if he lost consciousness, but did fall & landed on his. His wife did check his BG = 86 mg/dL. Normally runs 114 mg/dL. Had 1 Guinness that day. Ate, felt that he was hydrated.    No prodromal symptoms. Denies any chest pain, pressure, SOB/ELISE, PND, orthopnea, LE edema, palpitations, lightheadedness.    PMH:  He has a past medical history of Essential (primary) hypertension (2017), Personal history of other diseases of the digestive system, Pyogenic arthritis, unspecified (CMS/[facility]) (2017), Snoring, Type 2 diabetes mellitus without complications (CMS/[facility]) (2022), and Unspecified osteoarthritis, unspecified site (06/15/2017).    PSH:  He has a past surgical history that includes Rotator cuff repair (2014) and Total shoulder arthroplasty (2014).    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    FHx: Adopted    Allergies:  Animal dander, Cat dander, and Lisinopril    Outpatient Medications:  Current Outpatient Medications   Medication Instructions    ascorbic acid (VITAMIN C) 500 mg, oral, Daily    aspirin 81 mg, oral, Daily    cholecalciferol (VITAMIN D-3) 25 mcg, oral, Daily    FreeStyle Jacqueline sensor system kit APPLY 1 SENSOR TO THE BACK OF THE ARM EVERY 14 DAYS TO MONITOR BLOOD SUGAR    glucosamine-chondroitin 500-400 mg  "tablet 1 tablet, oral, 3 times daily    metFORMIN XR (GLUCOPHAGE-XR) 500 mg, oral, Daily with evening meal    milk thistle 175 mg, oral, Daily    naproxen sodium (ALEVE) 220 mg, oral, 3 times daily PRN    olmesartan-hydrochlorothiazide (BENIcar HCT) 40-25 mg tablet TAKE 1 TABLET BY MOUTH ONCE DAILY.    rosuvastatin (Crestor) 10 mg tablet TAKE 1 TABLET (10 MG) BY MOUTH ONCE DAILY.    tirzepatide 5 mg/0.5 mL pen injector INJECT 5 MG UNDER THE SKIN 1 (ONE) TIME PER WEEK.    triamcinolone (Kenalog) 0.1 % cream Apply to affected area 1-2 times daily as needed. Avoid face and groin.       Last Recorded Vitals:  Vitals:    12/06/23 0813   BP: 142/78   BP Location: Left arm   Patient Position: Sitting   Pulse: 67   Resp: 18   SpO2: 98%   Weight: 80.6 kg (177 lb 9.6 oz)   Height: 1.676 m (5' 6\")     Physical Exam:  On exam Mr. Kemp appears his stated age, is alert and oriented x3, and in no acute distress. His sclera are anicteric and his oropharynx has moist mucous membranes. His neck is supple and without thyromegaly. The JVP is ~5 cm of water above the right atrium. His cardiac exam has regular rhythm, normal S1, S2. No S3/4. There are no murmurs. His lungs are clear to auscultation bilaterally and there is no dullness to percussion. His abdomen is soft, nontender with normoactive bowel sounds. There is no HJR. The extremities are warm and without edema. The skin is dry. There is no rash present. The distal pulses are 2-3+ in all four extremities. His mood and affect are appropriate for todays encounter.      Last Labs:  CBC -  Lab Results   Component Value Date    WBC 6.3 05/03/2023    HGB 14.7 05/03/2023    HCT 41.7 05/03/2023    MCV 89 05/03/2023     05/03/2023     CMP -  Lab Results   Component Value Date    CALCIUM 9.3 05/03/2023    PROT 7.4 05/03/2023    ALBUMIN 4.8 05/03/2023    AST 16 05/03/2023    ALT 15 05/03/2023    ALKPHOS 69 05/03/2023    BILITOT 0.7 05/03/2023       LIPID PANEL -   Lab Results "   Component Value Date    CHOL 184 11/27/2023    TRIG 231 (H) 11/27/2023    HDL 32.3 11/27/2023    CHHDL 5.7 11/27/2023    LDLF 165 (H) 02/15/2023    VLDL 46 (H) 11/27/2023    NHDL 152 (H) 11/27/2023       RENAL FUNCTION PANEL -   Lab Results   Component Value Date    GLUCOSE 90 05/03/2023     05/03/2023    K 3.4 (L) 05/03/2023     05/03/2023    CO2 25 05/03/2023    ANIONGAP 13 05/03/2023    BUN 13 05/03/2023    CREATININE 0.71 05/03/2023    GFRMALE >90 05/03/2023    CALCIUM 9.3 05/03/2023    ALBUMIN 4.8 05/03/2023        Lab Results   Component Value Date    HGBA1C 5.6 11/27/2023       Last Cardiology Tests:  EKG shows Sinus Rhythm with HR 70 bpm, incomplete RBBB that dates back to at least 2015    I have reviewed the above labs & diagnostics    Assessment/Plan:  Syncope/Dizziness. One isolated event that seems vasovagal in nature. No prodromal symptoms. Episode has no recurred. Would get an echo given his PMH r/o any valvular disease. Would hold off on an EM as this was an isolated event.    HLD. With CAC Score 2019: total 206, 118 in LAD. Already on ASA & would continue. Recommend increasing Rosuvastatin to 40mg at bedtime. He wanted to hold off as his PCP just increased to 10mg at bedtime. Given his triglyceride level, smoking history, HTN & DM he is at high risk of further CAD. I would still advise increasing to 40mg.  HTN. /78 mmHg today. Would trend & may need adjustment in his regimen, will continue for now though as anxiety of today's appointment is most likely a factor.    PC follow up after echo.      Tracy M Schwab, APRN-CNP

## 2023-12-14 ENCOUNTER — TELEPHONE (OUTPATIENT)
Dept: PRIMARY CARE | Facility: CLINIC | Age: 58
End: 2023-12-14

## 2023-12-14 ENCOUNTER — LAB (OUTPATIENT)
Dept: LAB | Facility: LAB | Age: 58
End: 2023-12-14
Payer: COMMERCIAL

## 2023-12-14 DIAGNOSIS — E11.9 TYPE 2 DIABETES MELLITUS WITHOUT COMPLICATION, WITHOUT LONG-TERM CURRENT USE OF INSULIN (MULTI): ICD-10-CM

## 2023-12-14 DIAGNOSIS — L30.9 DERMATITIS: ICD-10-CM

## 2023-12-14 NOTE — TELEPHONE ENCOUNTER
Pt is calling stating that he was suppose to have blood work orders for Vitamin D and Testosterone? Pt is asking if someone can give him a call to let him know when the orders are placed?

## 2023-12-14 NOTE — TELEPHONE ENCOUNTER
REFILL  MEDICATION:     Triamcinolone 1% cream; Apply to affected area 1-2 times daily as needed. Avoid face and groin.     PHARM:  Pharmacy   PHARM NUMBER: [phone]    LR: 5-9-23   30 g with 0 refills   LV: 8-7-23  NV: No future appt.

## 2023-12-15 PROCEDURE — RXMED WILLOW AMBULATORY MEDICATION CHARGE

## 2023-12-15 RX ORDER — TRIAMCINOLONE ACETONIDE 1 MG/G
CREAM TOPICAL
Qty: 30 G | Refills: 0 | Status: SHIPPED | OUTPATIENT
Start: 2023-12-15 | End: 2023-12-29 | Stop reason: SDUPTHER

## 2023-12-19 ENCOUNTER — PHARMACY VISIT (OUTPATIENT)
Dept: PHARMACY | Facility: CLINIC | Age: 58
End: 2023-12-19
Payer: COMMERCIAL

## 2023-12-20 ENCOUNTER — TELEPHONE (OUTPATIENT)
Dept: PRIMARY CARE | Facility: CLINIC | Age: 58
End: 2023-12-20
Payer: COMMERCIAL

## 2023-12-20 DIAGNOSIS — E11.9 TYPE 2 DIABETES MELLITUS WITHOUT COMPLICATION, WITHOUT LONG-TERM CURRENT USE OF INSULIN (MULTI): ICD-10-CM

## 2023-12-20 RX ORDER — METFORMIN HYDROCHLORIDE 500 MG/1
500 TABLET, EXTENDED RELEASE ORAL
Qty: 30 TABLET | Refills: 0 | Status: CANCELLED | OUTPATIENT
Start: 2023-12-20 | End: 2024-01-19

## 2023-12-20 NOTE — TELEPHONE ENCOUNTER
Can't wait    Refill Mounjaro 2.5mg/0.5mL once weekly   LR: 03/01/23    Metformin 500mg 1qd  LR: 05/09/23    Pharmacy:  [facility]     LV: 11/30/23  No future appt

## 2023-12-22 ENCOUNTER — PHARMACY VISIT (OUTPATIENT)
Dept: PHARMACY | Facility: CLINIC | Age: 58
End: 2023-12-22
Payer: COMMERCIAL

## 2023-12-22 PROCEDURE — RXMED WILLOW AMBULATORY MEDICATION CHARGE

## 2023-12-22 RX ORDER — METFORMIN HYDROCHLORIDE 500 MG/1
TABLET, EXTENDED RELEASE ORAL
Qty: 180 TABLET | Refills: 0 | OUTPATIENT
Start: 2023-12-22

## 2023-12-29 ENCOUNTER — PHARMACY VISIT (OUTPATIENT)
Dept: PHARMACY | Facility: CLINIC | Age: 58
End: 2023-12-29
Payer: COMMERCIAL

## 2023-12-29 DIAGNOSIS — L30.9 DERMATITIS: ICD-10-CM

## 2023-12-29 PROCEDURE — RXMED WILLOW AMBULATORY MEDICATION CHARGE

## 2023-12-29 RX ORDER — TRIAMCINOLONE ACETONIDE 1 MG/G
CREAM TOPICAL
Qty: 60 G | Refills: 1 | Status: SHIPPED | OUTPATIENT
Start: 2023-12-29

## 2023-12-29 NOTE — TELEPHONE ENCOUNTER
Pt is requesting a refill on his Triamcinolone cream. Pt would like to know if he can get a bigger tube or additional refills for this?     REFILL  MEDICATION:     Triamcinolone 0.1 % cream; Apply to affected area 1-2 times daily as needed. Avoid face and groin.     PHARM: [facility]   PHARM NUMBER: [phone]    LR: 12-15-23   30 grams with 0 refills   LV: 8-7-23  NV: No future appt.

## 2023-12-30 ENCOUNTER — PHARMACY VISIT (OUTPATIENT)
Dept: PHARMACY | Facility: CLINIC | Age: 58
End: 2023-12-30

## 2024-01-02 ENCOUNTER — PHARMACY VISIT (OUTPATIENT)
Dept: PHARMACY | Facility: CLINIC | Age: 59
End: 2024-01-02

## 2024-01-20 PROCEDURE — RXMED WILLOW AMBULATORY MEDICATION CHARGE

## 2024-01-21 DIAGNOSIS — E11.9 TYPE 2 DIABETES MELLITUS WITHOUT COMPLICATION, WITHOUT LONG-TERM CURRENT USE OF INSULIN (MULTI): ICD-10-CM

## 2024-01-22 DIAGNOSIS — E11.9 TYPE 2 DIABETES MELLITUS WITHOUT COMPLICATION, WITHOUT LONG-TERM CURRENT USE OF INSULIN (MULTI): ICD-10-CM

## 2024-01-22 PROCEDURE — RXMED WILLOW AMBULATORY MEDICATION CHARGE

## 2024-01-22 RX ORDER — TIRZEPATIDE 5 MG/.5ML
5 INJECTION, SOLUTION SUBCUTANEOUS
Qty: 2 ML | Refills: 0 | Status: SHIPPED | OUTPATIENT
Start: 2024-01-22 | End: 2024-02-24 | Stop reason: SDUPTHER

## 2024-01-22 RX ORDER — TIRZEPATIDE 5 MG/.5ML
5 INJECTION, SOLUTION SUBCUTANEOUS
Qty: 2 ML | Refills: 0 | OUTPATIENT
Start: 2024-01-22

## 2024-01-23 ENCOUNTER — PHARMACY VISIT (OUTPATIENT)
Dept: PHARMACY | Facility: CLINIC | Age: 59
End: 2024-01-23
Payer: COMMERCIAL

## 2024-01-25 ENCOUNTER — PHARMACY VISIT (OUTPATIENT)
Dept: PHARMACY | Facility: CLINIC | Age: 59
End: 2024-01-25
Payer: COMMERCIAL

## 2024-02-09 ENCOUNTER — APPOINTMENT (OUTPATIENT)
Dept: CARDIOLOGY | Facility: CLINIC | Age: 59
End: 2024-02-09
Payer: COMMERCIAL

## 2024-02-17 PROCEDURE — RXMED WILLOW AMBULATORY MEDICATION CHARGE

## 2024-02-20 ENCOUNTER — PHARMACY VISIT (OUTPATIENT)
Dept: PHARMACY | Facility: CLINIC | Age: 59
End: 2024-02-20
Payer: COMMERCIAL

## 2024-02-20 ENCOUNTER — TELEMEDICINE (OUTPATIENT)
Dept: PHARMACY | Facility: HOSPITAL | Age: 59
End: 2024-02-20
Payer: COMMERCIAL

## 2024-02-20 DIAGNOSIS — E11.9 TYPE 2 DIABETES MELLITUS WITHOUT COMPLICATION, WITHOUT LONG-TERM CURRENT USE OF INSULIN (MULTI): Primary | ICD-10-CM

## 2024-02-20 ASSESSMENT — ENCOUNTER SYMPTOMS: DIABETIC ASSOCIATED SYMPTOMS: 0

## 2024-02-20 NOTE — ASSESSMENT & PLAN NOTE
Very well controlled, last A1c 5.6% 11/27/23.  On Mounjaro 5 mg weekly, metformin  mg daily  Lipids above goal on rosuvastatin 10 mg daily.  Not monitoring hypertension at home. Encourage to monitor more frequently given multiple antihypertensives and diabetes.    Plan:  Continue all current pharmacotherapy.     Employee Health Diabetes Program (VBID)  - Patient enrolled in  Employee diabetes program for $0 co-pays on diabetes medications/supplies. Enrollment should be active in 2-4 weeks  - Requested VBID enrollment date: 2/20/24   - PharmD Management Level: 4

## 2024-02-20 NOTE — PROGRESS NOTES
Subjective   Patient ID: Kuldeep Kemp is a 58 y.o. male who presents for Diabetes.    Referring Provider: Chanda Munoz DO     Diabetes  He presents for his follow-up diabetic visit. He has type 2 diabetes mellitus. His disease course has been improving. There are no hypoglycemic associated symptoms. There are no diabetic associated symptoms. There are no hypoglycemic complications. There are no diabetic complications. Risk factors for coronary artery disease include dyslipidemia, diabetes mellitus, hypertension, male sex and tobacco exposure. Current diabetic treatment includes oral agent (monotherapy) (+GLP-1 RA). He is compliant with treatment all of the time. His weight is stable. He is following a diabetic diet. Meal planning includes avoidance of concentrated sweets. An ACE inhibitor/angiotensin II receptor blocker is being taken.       Review of Systems    Medication System Management:  Affordability/Accessibility: on  VBID  Adherence/Organization: none  Adverse Effects: none    Objective     There were no vitals taken for this visit.     Labs  Lab Results   Component Value Date    BILITOT 0.7 05/03/2023    CALCIUM 9.3 05/03/2023    CO2 25 05/03/2023     05/03/2023    CREATININE 0.71 05/03/2023    GLUCOSE 90 05/03/2023    ALKPHOS 69 05/03/2023    K 3.4 (L) 05/03/2023    PROT 7.4 05/03/2023     05/03/2023    AST 16 05/03/2023    ALT 15 05/03/2023    BUN 13 05/03/2023    ANIONGAP 13 05/03/2023    ALBUMIN 4.8 05/03/2023    GFRMALE >90 05/03/2023     Lab Results   Component Value Date    TRIG 231 (H) 11/27/2023    CHOL 184 11/27/2023    LDLCALC 106 (H) 11/27/2023    HDL 32.3 11/27/2023     Lab Results   Component Value Date    HGBA1C 5.6 11/27/2023       Current Outpatient Medications on File Prior to Visit   Medication Sig Dispense Refill    ascorbic acid (Vitamin C) 500 mg tablet Take 1 tablet (500 mg) by mouth once daily.      aspirin 81 mg EC tablet Take 1 tablet (81 mg) by mouth once daily.       cholecalciferol (Vitamin D-3) 25 MCG (1000 UT) capsule Take 1 capsule (25 mcg) by mouth once daily.      flash glucose sensor kit kit APPLY 1 SENSOR TO THE BACK OF THE ARM EVERY 14 DAYS TO MONITOR BLOOD SUGAR 2 each 11    glucosamine-chondroitin 500-400 mg tablet Take 1 tablet by mouth 3 times a day.      metFORMIN XR (Glucophage-XR) 500 mg 24 hr tablet Take 1 tablet (500 mg) by mouth once daily in the evening. Take with meals.      metFORMIN XR (Glucophage-XR) 500 mg 24 hr tablet Take 2 tablets by mouth twice daily 180 tablet 0    milk thistle 175 mg tablet Take 1 tablet (175 mg) by mouth once daily.      naproxen sodium (Aleve) 220 mg tablet Take 1 tablet (220 mg) by mouth 3 times a day as needed for mild pain (1 - 3).      olmesartan-hydrochlorothiazide (BENIcar HCT) 40-25 mg tablet TAKE 1 TABLET BY MOUTH ONCE DAILY. 30 tablet 11    rosuvastatin (Crestor) 10 mg tablet TAKE 1 TABLET (10 MG) BY MOUTH ONCE DAILY. 90 tablet 3    tirzepatide (Mounjaro) 5 mg/0.5 mL pen injector INJECT 5 MG UNDER THE SKIN 1 (ONE) TIME PER WEEK. 2 mL 0    triamcinolone (Kenalog) 0.1 % cream Apply to affected area 1-2 times daily as needed. Avoid face and groin. 60 g 1     No current facility-administered medications on file prior to visit.        Assessment/Plan   Problem List Items Addressed This Visit             ICD-10-CM    Diabetes mellitus (CMS/[facility]) - Primary E11.9     Very well controlled, last A1c 5.6% 11/27/23.  On Mounjaro 5 mg weekly, metformin  mg daily  Lipids above goal on rosuvastatin 10 mg daily.  Not monitoring hypertension at home. Encourage to monitor more frequently given multiple antihypertensives and diabetes.    Plan:  Continue all current pharmacotherapy.     Employee Health Diabetes Program (VBID)  - Patient enrolled in  Employee diabetes program for $0 co-pays on diabetes medications/supplies. Enrollment should be active in 2-4 weeks  - Requested VBID enrollment date: 2/20/24   - PharmD Management  Level: 4            Follow up: 11 months for Re-Enrollment    Nii Campos, PharmD    Continue all meds under the continuation of care with the referring provider and clinical pharmacy team.

## 2024-02-21 PROCEDURE — RXMED WILLOW AMBULATORY MEDICATION CHARGE

## 2024-02-22 ENCOUNTER — PHARMACY VISIT (OUTPATIENT)
Dept: PHARMACY | Facility: CLINIC | Age: 59
End: 2024-02-22
Payer: COMMERCIAL

## 2024-02-23 ENCOUNTER — TELEPHONE (OUTPATIENT)
Dept: PRIMARY CARE | Facility: CLINIC | Age: 59
End: 2024-02-23
Payer: COMMERCIAL

## 2024-02-23 NOTE — TELEPHONE ENCOUNTER
REFILL REQUEST    Med: Mounjaro  Med Dose: 5 mg/0.5 mL  Med Frequency: inject once per week    Pharmacy: [facility]  Pharmacy Address: [address] [place] [address] in [place]     LR: 01/22/2024  LV: 11/30/2023  NV: None

## 2024-02-24 DIAGNOSIS — E11.9 TYPE 2 DIABETES MELLITUS WITHOUT COMPLICATION, WITHOUT LONG-TERM CURRENT USE OF INSULIN (MULTI): ICD-10-CM

## 2024-02-25 RX ORDER — TIRZEPATIDE 5 MG/.5ML
5 INJECTION, SOLUTION SUBCUTANEOUS
Qty: 2 ML | Refills: 0 | Status: SHIPPED | OUTPATIENT
Start: 2024-02-25 | End: 2024-04-02 | Stop reason: SDUPTHER

## 2024-02-27 PROCEDURE — RXMED WILLOW AMBULATORY MEDICATION CHARGE

## 2024-03-07 ENCOUNTER — PHARMACY VISIT (OUTPATIENT)
Dept: PHARMACY | Facility: CLINIC | Age: 59
End: 2024-03-07
Payer: COMMERCIAL

## 2024-03-14 PROCEDURE — RXMED WILLOW AMBULATORY MEDICATION CHARGE

## 2024-03-18 ENCOUNTER — APPOINTMENT (OUTPATIENT)
Dept: CARDIOLOGY | Facility: HOSPITAL | Age: 59
End: 2024-03-18
Payer: COMMERCIAL

## 2024-03-19 ENCOUNTER — PHARMACY VISIT (OUTPATIENT)
Dept: PHARMACY | Facility: CLINIC | Age: 59
End: 2024-03-19
Payer: COMMERCIAL

## 2024-03-20 ENCOUNTER — PHARMACY VISIT (OUTPATIENT)
Dept: PHARMACY | Facility: CLINIC | Age: 59
End: 2024-03-20
Payer: COMMERCIAL

## 2024-03-20 PROCEDURE — RXMED WILLOW AMBULATORY MEDICATION CHARGE

## 2024-03-22 ENCOUNTER — HOSPITAL ENCOUNTER (OUTPATIENT)
Dept: CARDIOLOGY | Facility: HOSPITAL | Age: 59
Discharge: HOME | End: 2024-03-22
Payer: COMMERCIAL

## 2024-03-22 DIAGNOSIS — I10 PRIMARY HYPERTENSION: ICD-10-CM

## 2024-03-22 DIAGNOSIS — R06.09 OTHER FORMS OF DYSPNEA: ICD-10-CM

## 2024-03-22 DIAGNOSIS — R55 SYNCOPE, UNSPECIFIED SYNCOPE TYPE: ICD-10-CM

## 2024-03-22 LAB
AORTIC VALVE MEAN GRADIENT: 2.8 MMHG
AORTIC VALVE PEAK VELOCITY: 1.15 M/S
AV PEAK GRADIENT: 5.3 MMHG
LEFT VENTRICLE INTERNAL DIMENSION DIASTOLE: 4.86 CM (ref 3.5–6)
RIGHT VENTRICLE FREE WALL PEAK S': 13 CM/S

## 2024-03-22 PROCEDURE — 93306 TTE W/DOPPLER COMPLETE: CPT | Performed by: INTERNAL MEDICINE

## 2024-03-22 PROCEDURE — 93306 TTE W/DOPPLER COMPLETE: CPT

## 2024-03-27 ENCOUNTER — APPOINTMENT (OUTPATIENT)
Dept: CARDIOLOGY | Facility: CLINIC | Age: 59
End: 2024-03-27
Payer: COMMERCIAL

## 2024-03-29 ENCOUNTER — APPOINTMENT (OUTPATIENT)
Dept: CARDIOLOGY | Facility: CLINIC | Age: 59
End: 2024-03-29
Payer: COMMERCIAL

## 2024-04-02 ENCOUNTER — TELEPHONE (OUTPATIENT)
Dept: PRIMARY CARE | Facility: CLINIC | Age: 59
End: 2024-04-02
Payer: COMMERCIAL

## 2024-04-02 DIAGNOSIS — E11.9 TYPE 2 DIABETES MELLITUS WITHOUT COMPLICATION, WITHOUT LONG-TERM CURRENT USE OF INSULIN (MULTI): ICD-10-CM

## 2024-04-02 RX ORDER — TIRZEPATIDE 5 MG/.5ML
5 INJECTION, SOLUTION SUBCUTANEOUS
Qty: 2 ML | Refills: 0 | Status: SHIPPED | OUTPATIENT
Start: 2024-04-02 | End: 2024-04-25 | Stop reason: SDUPTHER

## 2024-04-02 NOTE — TELEPHONE ENCOUNTER
Refill Mounjaro 5mg/0.5mL, inject 5mg underskin once weekly    He is requesting additional refills     [facility]: [facility]     LR: 02/25/24  LV: 11/30/24  No future appt

## 2024-04-05 ENCOUNTER — PHARMACY VISIT (OUTPATIENT)
Dept: PHARMACY | Facility: CLINIC | Age: 59
End: 2024-04-05
Payer: COMMERCIAL

## 2024-04-05 ENCOUNTER — TELEMEDICINE (OUTPATIENT)
Dept: CARDIOLOGY | Facility: CLINIC | Age: 59
End: 2024-04-05
Payer: COMMERCIAL

## 2024-04-05 DIAGNOSIS — E78.5 HYPERLIPIDEMIA, UNSPECIFIED HYPERLIPIDEMIA TYPE: Primary | ICD-10-CM

## 2024-04-05 DIAGNOSIS — I10 PRIMARY HYPERTENSION: ICD-10-CM

## 2024-04-05 DIAGNOSIS — R55 VASOVAGAL SYNCOPE: ICD-10-CM

## 2024-04-05 PROCEDURE — 1036F TOBACCO NON-USER: CPT | Performed by: NURSE PRACTITIONER

## 2024-04-05 PROCEDURE — 99213 OFFICE O/P EST LOW 20 MIN: CPT | Performed by: NURSE PRACTITIONER

## 2024-04-05 PROCEDURE — RXMED WILLOW AMBULATORY MEDICATION CHARGE

## 2024-04-05 NOTE — PROGRESS NOTES
Name : Kuldeep Kemp   : 1965   MRN : [id number]   ENC Date : 2024    CC: Virtual follow up review echo     HPI:    Kuldeep Kemp is a 59 y.o. male with PMHx sig HTN, HLD & DMII presenting to review echo results done for c/o syncope.    Thanksgiving weekend he was getting off his tractor & emptied the bag full of leaves. Had done it for 3hrs, event went over to start helping his neighbor. Got dizzy, reached for the [place] seat, unsure if he lost consciousness, but did fall & landed on his. His wife did check his BG = 86 mg/dL. Normally runs 114 mg/dL. Had 1 Guinness that day. Ate, felt that he was hydrated.    No prodromal symptoms. Denies any chest pain, pressure, SOB/ELISE, PND, orthopnea, LE edema, palpitations, lightheadedness.    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: Adopted    Allergies:  Animal dander, Cat dander, and Lisinopril    Outpatient Medications:  Current Outpatient Medications   Medication Instructions    ascorbic acid (VITAMIN C) 500 mg, oral, Daily    aspirin 81 mg, oral, Daily    cholecalciferol (VITAMIN D-3) 25 mcg, oral, Daily    flash glucose sensor kit kit APPLY 1 SENSOR TO THE BACK OF THE ARM EVERY 14 DAYS TO MONITOR BLOOD SUGAR    glucosamine-chondroitin 500-400 mg tablet 1 tablet, oral, 3 times daily    metFORMIN XR (Glucophage-XR) 500 mg 24 hr tablet Take 2 tablets by mouth twice daily    metFORMIN XR (GLUCOPHAGE-XR) 500 mg, oral, Daily with evening meal    milk thistle 175 mg, oral, Daily    naproxen sodium (ALEVE) 220 mg, oral, 3 times daily PRN    olmesartan-hydrochlorothiazide (BENIcar HCT) 40-25 mg tablet TAKE 1 TABLET BY MOUTH ONCE DAILY.    rosuvastatin (Crestor) 10 mg tablet TAKE 1 TABLET (10 MG) BY  MOUTH ONCE DAILY.    tirzepatide (Mounjaro) 5 mg/0.5 mL pen injector INJECT 5 MG UNDER THE SKIN 1 (ONE) TIME PER WEEK.    triamcinolone (Kenalog) 0.1 % cream Apply to affected area 1-2 times daily as needed. Avoid face and groin.       Last Recorded Vitals:  There were no vitals filed for this visit. Virtual    Physical Exam:  A+Ox3, NAD     Last Labs:  CBC -  Lab Results   Component Value Date    WBC 6.3 05/03/2023    HGB 14.7 05/03/2023    HCT 41.7 05/03/2023    MCV 89 05/03/2023     05/03/2023     CMP -  Lab Results   Component Value Date    CALCIUM 9.3 05/03/2023    PROT 7.4 05/03/2023    ALBUMIN 4.8 05/03/2023    AST 16 05/03/2023    ALT 15 05/03/2023    ALKPHOS 69 05/03/2023    BILITOT 0.7 05/03/2023       LIPID PANEL -   Lab Results   Component Value Date    CHOL 184 11/27/2023    TRIG 231 (H) 11/27/2023    HDL 32.3 11/27/2023    CHHDL 5.7 11/27/2023    LDLF 165 (H) 02/15/2023    VLDL 46 (H) 11/27/2023    NHDL 152 (H) 11/27/2023       RENAL FUNCTION PANEL -   Lab Results   Component Value Date    GLUCOSE 90 05/03/2023     05/03/2023    K 3.4 (L) 05/03/2023     05/03/2023    CO2 25 05/03/2023    ANIONGAP 13 05/03/2023    BUN 13 05/03/2023    CREATININE 0.71 05/03/2023    GFRMALE >90 05/03/2023    CALCIUM 9.3 05/03/2023    ALBUMIN 4.8 05/03/2023        Lab Results   Component Value Date    HGBA1C 5.6 11/27/2023       Last Cardiology Tests:  Echo 3/22/24: EF 55-60%, no abnormalities    EKG shows Sinus Rhythm with HR 70 bpm, incomplete RBBB that dates back to at least 2015    I have reviewed the above labs & diagnostics    Assessment/Plan:  Syncope/Dizziness. One isolated event that seems vasovagal in nature. No prodromal symptoms. Episode has no recurred. Echo without any valvular abnormalities & normal EF. Consider EM if there is recurrence     HLD. With CAC Score 2019: total 206, 118 in LAD. Already on ASA & would continue. Recommend increasing Rosuvastatin to 40mg at bedtime. He wanted to  hold off as his PCP just increased to 10mg at bedtime. Given his triglyceride level, smoking history, HTN & DM he is at high risk of further CAD. I would still advise increasing to 40mg.    HTN. /78 mmHg today. Would trend & may need adjustment in his regimen, will continue for now though as anxiety of today's appointment is most likely a factor.    Follow up in 1 year or as needed     Tracy M Schwab, APRN-CNP   Pounds Preamble Statement (Weight Entered In Details Tab): Reported Weight in pounds:

## 2024-04-10 PROCEDURE — RXMED WILLOW AMBULATORY MEDICATION CHARGE

## 2024-04-13 ENCOUNTER — PHARMACY VISIT (OUTPATIENT)
Dept: PHARMACY | Facility: CLINIC | Age: 59
End: 2024-04-13
Payer: COMMERCIAL

## 2024-04-25 ENCOUNTER — TELEPHONE (OUTPATIENT)
Dept: PRIMARY CARE | Facility: CLINIC | Age: 59
End: 2024-04-25
Payer: COMMERCIAL

## 2024-04-25 DIAGNOSIS — E11.9 TYPE 2 DIABETES MELLITUS WITHOUT COMPLICATION, WITHOUT LONG-TERM CURRENT USE OF INSULIN (MULTI): ICD-10-CM

## 2024-04-25 RX ORDER — TIRZEPATIDE 5 MG/.5ML
5 INJECTION, SOLUTION SUBCUTANEOUS
Qty: 2 ML | Refills: 3 | Status: SHIPPED | OUTPATIENT
Start: 2024-04-28 | End: 2024-05-01 | Stop reason: SDUPTHER

## 2024-04-25 NOTE — TELEPHONE ENCOUNTER
REFILL REQUEST    Med: Mounjaro  Med Dose: 5 mg/0.5 mL  Med Frequency: inject once weekly  Patient is requesting refills be added. He is upset that he has to call every month for refills    [facility]  Pharmacy Address: [address] [place] [address]    LR: 04/02/2024  LV: 11/30/2023 (sick)  NV: None

## 2024-04-25 NOTE — TELEPHONE ENCOUNTER
3 months of Mounjaro sent to the pharm for pt. Please advise him of my leaving the practice and he will need to re establish with a new PCP for his DM. The letters will be mailed soon,      Thank you,  Chanda Munoz, DO

## 2024-04-30 ENCOUNTER — TELEPHONE (OUTPATIENT)
Dept: PRIMARY CARE | Facility: CLINIC | Age: 59
End: 2024-04-30
Payer: COMMERCIAL

## 2024-04-30 NOTE — TELEPHONE ENCOUNTER
Patient called and stated pharmacies cannot get Mounjaro 5mg/mL in stock. He would like to know if you can send in a RX for Mounjaro 2.5mg/0.5mL with directions stating to use 5mg (2 pens) once weekly.       Pharmacy  [facility]  [address], [address], [facility] [address]  Phone: [phone]  Fax: [phone]

## 2024-05-01 ENCOUNTER — TELEPHONE VISIT (OUTPATIENT)
Dept: PHARMACY | Facility: HOSPITAL | Age: 59
End: 2024-05-01

## 2024-05-01 ENCOUNTER — TELEPHONE VISIT (OUTPATIENT)
Dept: PHARMACY | Facility: CLINIC | Age: 59
End: 2024-05-01

## 2024-05-01 ENCOUNTER — TELEMEDICINE (OUTPATIENT)
Dept: PHARMACY | Facility: HOSPITAL | Age: 59
End: 2024-05-01
Payer: COMMERCIAL

## 2024-05-01 DIAGNOSIS — E11.65 TYPE 2 DIABETES MELLITUS WITH HYPERGLYCEMIA, WITHOUT LONG-TERM CURRENT USE OF INSULIN (MULTI): Primary | ICD-10-CM

## 2024-05-01 DIAGNOSIS — E11.65 TYPE 2 DIABETES MELLITUS WITH HYPERGLYCEMIA, WITHOUT LONG-TERM CURRENT USE OF INSULIN (MULTI): ICD-10-CM

## 2024-05-01 DIAGNOSIS — E11.9 TYPE 2 DIABETES MELLITUS WITHOUT COMPLICATION, WITHOUT LONG-TERM CURRENT USE OF INSULIN (MULTI): Primary | ICD-10-CM

## 2024-05-01 PROCEDURE — RXMED WILLOW AMBULATORY MEDICATION CHARGE

## 2024-05-01 RX ORDER — TIRZEPATIDE 5 MG/.5ML
5 INJECTION, SOLUTION SUBCUTANEOUS
Qty: 2 ML | Refills: 3 | Status: SHIPPED | OUTPATIENT
Start: 2024-05-05

## 2024-05-01 NOTE — TELECONSULT
[facility] Pharmacy Consult  Kuldeep Kemp is a 59 y.o. male was referred to Clinical Pharmacy Team for a Pharmacy consult.  The patient was referred for their Mounjaro consult.    Referring Provider: Chanda Munoz DO    Subjective   Allergies   Allergen Reactions    Animal Dander Unknown    Cat Dander Unknown    Lisinopril Cough       [facility]  [address]  [facility] [address]  Phone: [phone] Fax: [phone]    [facility] - [place], [place] - [address] [facility]  [address] [facility] [address]  Phone: [phone] Fax: [phone]      HPI    Patient reported that he is unable to obtain his Mounjaro due to back order. Referred to [facility] pharmacy team to help.     Review of Systems    Objective     There were no vitals taken for this visit.     LAB  Lab Results   Component Value Date    BILITOT 0.7 05/03/2023    CALCIUM 9.3 05/03/2023    CO2 25 05/03/2023     05/03/2023    CREATININE 0.71 05/03/2023    GLUCOSE 90 05/03/2023    ALKPHOS 69 05/03/2023    K 3.4 (L) 05/03/2023    PROT 7.4 05/03/2023     05/03/2023    AST 16 05/03/2023    ALT 15 05/03/2023    BUN 13 05/03/2023    ANIONGAP 13 05/03/2023    ALBUMIN 4.8 05/03/2023    GFRMALE >90 05/03/2023     Lab Results   Component Value Date    TRIG 231 (H) 11/27/2023    CHOL 184 11/27/2023    LDLCALC 106 (H) 11/27/2023    HDL 32.3 11/27/2023     Lab Results   Component Value Date    HGBA1C 5.6 11/27/2023       Current Outpatient Medications on File Prior to Visit   Medication Sig Dispense Refill    ascorbic acid (Vitamin C) 500 mg tablet Take 1 tablet (500 mg) by mouth once daily.      aspirin 81 mg EC tablet Take 1 tablet (81 mg) by mouth once daily.      cholecalciferol (Vitamin D-3) 25 MCG (1000 UT) capsule Take 1 capsule (25 mcg) by mouth once daily.      flash glucose sensor kit kit APPLY 1 SENSOR TO THE BACK OF THE ARM EVERY 14 DAYS TO MONITOR BLOOD SUGAR 2 each 11     glucosamine-chondroitin 500-400 mg tablet Take 1 tablet by mouth 3 times a day.      metFORMIN XR (Glucophage-XR) 500 mg 24 hr tablet Take 1 tablet (500 mg) by mouth once daily in the evening. Take with meals.      metFORMIN XR (Glucophage-XR) 500 mg 24 hr tablet Take 2 tablets by mouth twice daily 180 tablet 0    milk thistle 175 mg tablet Take 1 tablet (175 mg) by mouth once daily.      naproxen sodium (Aleve) 220 mg tablet Take 1 tablet (220 mg) by mouth 3 times a day as needed for mild pain (1 - 3).      olmesartan-hydrochlorothiazide (BENIcar HCT) 40-25 mg tablet TAKE 1 TABLET BY MOUTH ONCE DAILY. 30 tablet 11    rosuvastatin (Crestor) 10 mg tablet TAKE 1 TABLET (10 MG) BY MOUTH ONCE DAILY. 90 tablet 3    triamcinolone (Kenalog) 0.1 % cream Apply to affected area 1-2 times daily as needed. Avoid face and groin. 60 g 1    [DISCONTINUED] tirzepatide (Mounjaro) 5 mg/0.5 mL pen injector INJECT 5 MG UNDER THE SKIN 1 (ONE) TIME PER WEEK. 2 mL 0    [DISCONTINUED] tirzepatide (Mounjaro) 5 mg/0.5 mL pen injector INJECT 5 MG UNDER THE SKIN 1 (ONE) TIME PER WEEK. 2 mL 3     No current facility-administered medications on file prior to visit.        Assessment/Plan   Problem List Items Addressed This Visit       Type 2 diabetes mellitus with hyperglycemia (Multi) - Primary    Relevant Medications    tirzepatide (Mounjaro) 5 mg/0.5 mL pen injector (Start on 5/5/2024)    Other Relevant Orders    Referral to Clinical Pharmacy      [facility] Eligible script sent to [facility] pharmacy as they have some in stock for the patient to   Educate on medication side effects  CONTINUE all medications as prescribed    Continue all meds under the continuation of care with the referring provider and clinical pharmacy team.    Fazal Palma PharmD     Verbal consent to manage patient's drug therapy was obtained from [the patient and/or an individual authorized to act on behalf of a patient]. They were informed they may decline to  participate or withdraw from participation in pharmacy services at any time.

## 2024-05-01 NOTE — PROGRESS NOTES
[facility] Pharmacy Consult  Kuldeep Kemp is a 59 y.o. male was referred to Clinical Pharmacy Team for a Pharmacy consult.  The patient was referred for their Mounjaro consult.     Referring Provider: Chanda Munoz DO     Subjective        Allergies   Allergen Reactions    Animal Dander Unknown    Cat Dander Unknown    Lisinopril Cough         [facility]  [address] , [address]  [facility] [address]  Phone: [phone] Fax: [phone]     [id number] - [place], [place] - [address]  [address] [address]  Phone: [phone] Fax: [phone]        HPI     Patient reported that he is unable to obtain his Mounjaro due to back order. Referred to [facility] pharmacy team to help.      Review of Systems     Objective      There were no vitals taken for this visit.      LAB        Lab Results   Component Value Date     BILITOT 0.7 05/03/2023     CALCIUM 9.3 05/03/2023     CO2 25 05/03/2023      05/03/2023     CREATININE 0.71 05/03/2023     GLUCOSE 90 05/03/2023     ALKPHOS 69 05/03/2023     K 3.4 (L) 05/03/2023     PROT 7.4 05/03/2023      05/03/2023     AST 16 05/03/2023     ALT 15 05/03/2023     BUN 13 05/03/2023     ANIONGAP 13 05/03/2023     ALBUMIN 4.8 05/03/2023     GFRMALE >90 05/03/2023            Lab Results   Component Value Date     TRIG 231 (H) 11/27/2023     CHOL 184 11/27/2023     LDLCALC 106 (H) 11/27/2023     HDL 32.3 11/27/2023            Lab Results   Component Value Date     HGBA1C 5.6 11/27/2023                Current Outpatient Medications on File Prior to Visit   Medication Sig Dispense Refill    ascorbic acid (Vitamin C) 500 mg tablet Take 1 tablet (500 mg) by mouth once daily.        aspirin 81 mg EC tablet Take 1 tablet (81 mg) by mouth once daily.        cholecalciferol (Vitamin D-3) 25 MCG (1000 UT) capsule Take 1 capsule (25 mcg) by mouth once daily.        flash glucose sensor kit kit APPLY 1 SENSOR TO THE BACK OF THE  ARM EVERY 14 DAYS TO MONITOR BLOOD SUGAR 2 each 11    glucosamine-chondroitin 500-400 mg tablet Take 1 tablet by mouth 3 times a day.        metFORMIN XR (Glucophage-XR) 500 mg 24 hr tablet Take 1 tablet (500 mg) by mouth once daily in the evening. Take with meals.        metFORMIN XR (Glucophage-XR) 500 mg 24 hr tablet Take 2 tablets by mouth twice daily 180 tablet 0    milk thistle 175 mg tablet Take 1 tablet (175 mg) by mouth once daily.        naproxen sodium (Aleve) 220 mg tablet Take 1 tablet (220 mg) by mouth 3 times a day as needed for mild pain (1 - 3).        olmesartan-hydrochlorothiazide (BENIcar HCT) 40-25 mg tablet TAKE 1 TABLET BY MOUTH ONCE DAILY. 30 tablet 11    rosuvastatin (Crestor) 10 mg tablet TAKE 1 TABLET (10 MG) BY MOUTH ONCE DAILY. 90 tablet 3    triamcinolone (Kenalog) 0.1 % cream Apply to affected area 1-2 times daily as needed. Avoid face and groin. 60 g 1    [DISCONTINUED] tirzepatide (Mounjaro) 5 mg/0.5 mL pen injector INJECT 5 MG UNDER THE SKIN 1 (ONE) TIME PER WEEK. 2 mL 0    [DISCONTINUED] tirzepatide (Mounjaro) 5 mg/0.5 mL pen injector INJECT 5 MG UNDER THE SKIN 1 (ONE) TIME PER WEEK. 2 mL 3      No current facility-administered medications on file prior to visit.         Assessment/Plan   Problem List Items Addressed This Visit         Type 2 diabetes mellitus with hyperglycemia (Multi) - Primary     Relevant Medications     tirzepatide (Mounjaro) 5 mg/0.5 mL pen injector (Start on 5/5/2024)     Other Relevant Orders     Referral to Clinical Pharmacy      WEARJEAN-PAUL Eligible script sent to [facility] pharmacy as they have some in stock for the patient to   Educate on medication side effects  CONTINUE all medications as prescribed     Continue all meds under the continuation of care with the referring provider and clinical pharmacy team.     Fazal Palma PharmD      Verbal consent to manage patient's drug therapy was obtained from [the patient and/or an individual authorized to  act on behalf of a patient]. They were informed they may decline to participate or withdraw from participation in pharmacy services at any time.

## 2024-05-01 NOTE — CONSULTS
[facility] Pharmacy Consult  Kuldeep Kemp is a 59 y.o. male was referred to Clinical Pharmacy Team for a Pharmacy consult.  The patient was referred for their Mounjaro consult.     Referring Provider: Chanda Munoz DO     Subjective        Allergies   Allergen Reactions    Animal Dander Unknown    Cat Dander Unknown    Lisinopril Cough         [facility]  [address] , [address]  [facility] [address]  Phone: [phone] Fax: [phone]     [facility] - [place], [place] - [address]  [address] [facility] [address]  Phone: [phone] Fax: [phone]        HPI     Patient reported that he is unable to obtain his Mounjaro due to back order. Referred to Health system pharmacy team to help.      Review of Systems     Objective      There were no vitals taken for this visit.      LAB        Lab Results   Component Value Date     BILITOT 0.7 05/03/2023     CALCIUM 9.3 05/03/2023     CO2 25 05/03/2023      05/03/2023     CREATININE 0.71 05/03/2023     GLUCOSE 90 05/03/2023     ALKPHOS 69 05/03/2023     K 3.4 (L) 05/03/2023     PROT 7.4 05/03/2023      05/03/2023     AST 16 05/03/2023     ALT 15 05/03/2023     BUN 13 05/03/2023     ANIONGAP 13 05/03/2023     ALBUMIN 4.8 05/03/2023     GFRMALE >90 05/03/2023            Lab Results   Component Value Date     TRIG 231 (H) 11/27/2023     CHOL 184 11/27/2023     LDLCALC 106 (H) 11/27/2023     HDL 32.3 11/27/2023            Lab Results   Component Value Date     HGBA1C 5.6 11/27/2023                Current Outpatient Medications on File Prior to Visit   Medication Sig Dispense Refill    ascorbic acid (Vitamin C) 500 mg tablet Take 1 tablet (500 mg) by mouth once daily.        aspirin 81 mg EC tablet Take 1 tablet (81 mg) by mouth once daily.        cholecalciferol (Vitamin D-3) 25 MCG (1000 UT) capsule Take 1 capsule (25 mcg) by mouth once daily.        flash glucose sensor kit kit APPLY 1 SENSOR TO THE BACK OF THE  ARM EVERY 14 DAYS TO MONITOR BLOOD SUGAR 2 each 11    glucosamine-chondroitin 500-400 mg tablet Take 1 tablet by mouth 3 times a day.        metFORMIN XR (Glucophage-XR) 500 mg 24 hr tablet Take 1 tablet (500 mg) by mouth once daily in the evening. Take with meals.        metFORMIN XR (Glucophage-XR) 500 mg 24 hr tablet Take 2 tablets by mouth twice daily 180 tablet 0    milk thistle 175 mg tablet Take 1 tablet (175 mg) by mouth once daily.        naproxen sodium (Aleve) 220 mg tablet Take 1 tablet (220 mg) by mouth 3 times a day as needed for mild pain (1 - 3).        olmesartan-hydrochlorothiazide (BENIcar HCT) 40-25 mg tablet TAKE 1 TABLET BY MOUTH ONCE DAILY. 30 tablet 11    rosuvastatin (Crestor) 10 mg tablet TAKE 1 TABLET (10 MG) BY MOUTH ONCE DAILY. 90 tablet 3    triamcinolone (Kenalog) 0.1 % cream Apply to affected area 1-2 times daily as needed. Avoid face and groin. 60 g 1    [DISCONTINUED] tirzepatide (Mounjaro) 5 mg/0.5 mL pen injector INJECT 5 MG UNDER THE SKIN 1 (ONE) TIME PER WEEK. 2 mL 0    [DISCONTINUED] tirzepatide (Mounjaro) 5 mg/0.5 mL pen injector INJECT 5 MG UNDER THE SKIN 1 (ONE) TIME PER WEEK. 2 mL 3      No current facility-administered medications on file prior to visit.         Assessment/Plan   Problem List Items Addressed This Visit         Type 2 diabetes mellitus with hyperglycemia (Multi) - Primary     Relevant Medications     tirzepatide (Mounjaro) 5 mg/0.5 mL pen injector (Start on 5/5/2024)     Other Relevant Orders     Referral to Clinical Pharmacy      WEARJEAN-PAUL Eligible script sent to [facility] pharmacy as they have some in stock for the patient to   Educate on medication side effects  CONTINUE all medications as prescribed     Continue all meds under the continuation of care with the referring provider and clinical pharmacy team.     Fazal Palma PharmD      Verbal consent to manage patient's drug therapy was obtained from [the patient and/or an individual authorized to  act on behalf of a patient]. They were informed they may decline to participate or withdraw from participation in pharmacy services at any time.

## 2024-05-03 ENCOUNTER — PHARMACY VISIT (OUTPATIENT)
Dept: PHARMACY | Facility: CLINIC | Age: 59
End: 2024-05-03
Payer: COMMERCIAL

## 2024-05-21 PROCEDURE — RXMED WILLOW AMBULATORY MEDICATION CHARGE

## 2024-05-24 ENCOUNTER — PHARMACY VISIT (OUTPATIENT)
Dept: PHARMACY | Facility: CLINIC | Age: 59
End: 2024-05-24
Payer: COMMERCIAL

## 2024-05-25 ENCOUNTER — PHARMACY VISIT (OUTPATIENT)
Dept: PHARMACY | Facility: CLINIC | Age: 59
End: 2024-05-25
Payer: COMMERCIAL

## 2024-05-25 PROCEDURE — RXMED WILLOW AMBULATORY MEDICATION CHARGE

## 2024-06-12 DIAGNOSIS — E11.9 TYPE 2 DIABETES MELLITUS WITHOUT COMPLICATION, WITHOUT LONG-TERM CURRENT USE OF INSULIN (MULTI): ICD-10-CM

## 2024-06-13 PROCEDURE — RXMED WILLOW AMBULATORY MEDICATION CHARGE

## 2024-06-13 RX ORDER — OLMESARTAN MEDOXOMIL AND HYDROCHLOROTHIAZIDE 40/25 40; 25 MG/1; MG/1
1 TABLET ORAL DAILY
Qty: 30 TABLET | Refills: 11 | Status: SHIPPED | OUTPATIENT
Start: 2024-06-13 | End: 2025-06-13

## 2024-06-18 ENCOUNTER — PHARMACY VISIT (OUTPATIENT)
Dept: PHARMACY | Facility: CLINIC | Age: 59
End: 2024-06-18
Payer: COMMERCIAL

## 2024-06-25 PROCEDURE — RXMED WILLOW AMBULATORY MEDICATION CHARGE

## 2024-06-28 ENCOUNTER — PHARMACY VISIT (OUTPATIENT)
Dept: PHARMACY | Facility: CLINIC | Age: 59
End: 2024-06-28
Payer: COMMERCIAL

## 2024-07-12 PROCEDURE — RXMED WILLOW AMBULATORY MEDICATION CHARGE

## 2024-07-15 ENCOUNTER — APPOINTMENT (OUTPATIENT)
Dept: PHARMACY | Facility: HOSPITAL | Age: 59
End: 2024-07-15
Payer: COMMERCIAL

## 2024-07-15 DIAGNOSIS — E11.9 TYPE 2 DIABETES MELLITUS WITHOUT COMPLICATION, WITHOUT LONG-TERM CURRENT USE OF INSULIN (MULTI): ICD-10-CM

## 2024-07-15 ASSESSMENT — ENCOUNTER SYMPTOMS: DIABETIC ASSOCIATED SYMPTOMS: 0

## 2024-07-15 NOTE — PROGRESS NOTES
[facility] (VBID)    Kuldeep Kemp is a 59 y.o. male was referred to Clinical Pharmacy Team to complete a comprehensive medication review (CMR) with a pharmacist as part of the [employer] diabetes program.  Pharmacy team may also provide assistance in diabetes management per discussion with referring provider and/or endocrinology.    Referring Provider: Chanda Munoz DO  Does patient follow with Endocrinology: No    Subjective   Allergies   Allergen Reactions    Animal Dander Unknown    Cat Dander Unknown    Lisinopril Cough       [facility]  [address]  [facility] [address]  Phone: [phone] Fax: [phone]    [id number] - [place], [place] - [address]  [address] [facility] [facility] [address]  Phone: [phone] Fax: [phone]      Diabetes  He presents for his follow-up diabetic visit. He has type 2 diabetes mellitus. There are no hypoglycemic associated symptoms. There are no diabetic associated symptoms. Risk factors for coronary artery disease include diabetes mellitus. Current diabetic treatments: Mounjaro 5 mg weekly. He is compliant with treatment all of the time.       Objective     There were no vitals taken for this visit.     LAB  Lab Results   Component Value Date    BILITOT 0.7 05/03/2023    CALCIUM 9.3 05/03/2023    CO2 25 05/03/2023     05/03/2023    CREATININE 0.71 05/03/2023    GLUCOSE 90 05/03/2023    ALKPHOS 69 05/03/2023    K 3.4 (L) 05/03/2023    PROT 7.4 05/03/2023     05/03/2023    AST 16 05/03/2023    ALT 15 05/03/2023    BUN 13 05/03/2023    ANIONGAP 13 05/03/2023    ALBUMIN 4.8 05/03/2023    GFRMALE >90 05/03/2023     Lab Results   Component Value Date    TRIG 231 (H) 11/27/2023    CHOL 184 11/27/2023    LDLCALC 106 (H) 11/27/2023    HDL 32.3 11/27/2023     Lab Results   Component Value Date    HGBA1C 5.6 11/27/2023       Current Outpatient  Medications on File Prior to Visit   Medication Sig Dispense Refill    ascorbic acid (Vitamin C) 500 mg tablet Take 1 tablet (500 mg) by mouth once daily.      aspirin 81 mg EC tablet Take 1 tablet (81 mg) by mouth once daily.      cholecalciferol (Vitamin D-3) 25 MCG (1000 UT) capsule Take 1 capsule (25 mcg) by mouth once daily.      flash glucose sensor kit kit APPLY 1 SENSOR TO THE BACK OF THE ARM EVERY 14 DAYS TO MONITOR BLOOD SUGAR 2 each 11    glucosamine-chondroitin 500-400 mg tablet Take 1 tablet by mouth 3 times a day.      metFORMIN XR (Glucophage-XR) 500 mg 24 hr tablet Take 1 tablet (500 mg) by mouth once daily in the evening. Take with meals. (Patient not taking: Reported on 7/15/2024)      metFORMIN XR (Glucophage-XR) 500 mg 24 hr tablet Take 2 tablets by mouth twice daily (Patient not taking: Reported on 7/15/2024) 180 tablet 0    milk thistle 175 mg tablet Take 1 tablet (175 mg) by mouth once daily.      naproxen sodium (Aleve) 220 mg tablet Take 1 tablet (220 mg) by mouth 3 times a day as needed for mild pain (1 - 3).      olmesartan-hydrochlorothiazide (BENIcar HCT) 40-25 mg tablet TAKE 1 TABLET BY MOUTH ONCE DAILY. 30 tablet 11    rosuvastatin (Crestor) 10 mg tablet TAKE 1 TABLET (10 MG) BY MOUTH ONCE DAILY. (Patient not taking: Reported on 7/15/2024) 90 tablet 3    tirzepatide (Mounjaro) 5 mg/0.5 mL pen injector Inject 5 mg under the skin 1 (one) time per week. 2 mL 3    triamcinolone (Kenalog) 0.1 % cream Apply to affected area 1-2 times daily as needed. Avoid face and groin. 60 g 1     No current facility-administered medications on file prior to visit.        HISTORICAL PHARMACOTHERAPY (if relevant)  -Metformin  mg: stopped d/t frequent Jacqueline low alarms    CURRENT PHARMACOTHERAPY (if differing from list above)  -Mounjaro 5 mg weekly    Secondary Prevention:  Statin? No (patient stopped d/t ankle pain)  ACE-I/ARB? No  Aspirin? Yes    Pertinent PMH Review:  PMH of Pancreatitis: No  PMH of  Retinopathy: No  PMH of Urinary Tract Infections: No  PMH of MTC: No    Glucose Readings:  Glucometer/CGM Type: Jacqueline  Any episodes of hypoglycemia? Jacqueline reports lows as low as 56, but states no symptoms    ASSESSMENT/PLAN:   Employee Health Diabetes Program (VBID)  - Patient enrolled in  Employee diabetes program for $0 co-pays on diabetes medications/supplies. Enrollment should be active in 2-4 weeks.  - Requested VBID enrollment date: 7/15/24  - PharmD Management Level: 3-4  -  Pharmacy fill location: [facility]    Assessment/Plan   Problem List Items Addressed This Visit       Diabetes mellitus (Multi)     Continue Mounjaro 5 mg weekly         Relevant Orders    Hemoglobin A1c    Comprehensive metabolic panel    Lipid panel          Follow up: 8/5/24 at 8:30 am    Continue all meds under the continuation of care with the referring provider and clinical pharmacy team.    Dwayne Raman, PharmD     Verbal consent to manage patient's drug therapy was obtained from [the patient and/or an individual authorized to act on behalf of a patient]. They were informed they may decline to participate or withdraw from participation in pharmacy services at any time.

## 2024-07-15 NOTE — Clinical Note
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 Currently, well maintained on Mounjaro 5 mg (pt stopped metformin d/t Jacqueline reporting lows). Advised pt to double check with fingerstick since they are non symptomatic. Ordered updated labs prior to your NPV and will follow up for refills post your visit.

## 2024-07-17 ENCOUNTER — PHARMACY VISIT (OUTPATIENT)
Dept: PHARMACY | Facility: CLINIC | Age: 59
End: 2024-07-17
Payer: COMMERCIAL

## 2024-07-24 PROCEDURE — RXMED WILLOW AMBULATORY MEDICATION CHARGE

## 2024-07-24 RX ORDER — METFORMIN HYDROCHLORIDE 500 MG/1
TABLET, EXTENDED RELEASE ORAL
Qty: 180 TABLET | Refills: 0 | Status: CANCELLED | OUTPATIENT
Start: 2024-03-14

## 2024-07-27 ENCOUNTER — PHARMACY VISIT (OUTPATIENT)
Dept: PHARMACY | Facility: CLINIC | Age: 59
End: 2024-07-27
Payer: COMMERCIAL

## 2024-07-29 ENCOUNTER — LAB (OUTPATIENT)
Dept: LAB | Facility: LAB | Age: 59
End: 2024-07-29
Payer: COMMERCIAL

## 2024-07-29 DIAGNOSIS — Z12.5 SCREENING FOR PROSTATE CANCER: ICD-10-CM

## 2024-07-29 DIAGNOSIS — E11.9 TYPE 2 DIABETES MELLITUS WITHOUT COMPLICATION, WITHOUT LONG-TERM CURRENT USE OF INSULIN (MULTI): ICD-10-CM

## 2024-07-29 DIAGNOSIS — E87.6 HYPOKALEMIA: Primary | ICD-10-CM

## 2024-07-29 LAB
ALBUMIN SERPL BCP-MCNC: 4.6 G/DL (ref 3.4–5)
ALP SERPL-CCNC: 50 U/L (ref 33–136)
ALT SERPL W P-5'-P-CCNC: 19 U/L (ref 10–52)
ANION GAP SERPL CALC-SCNC: 11 MMOL/L (ref 10–20)
AST SERPL W P-5'-P-CCNC: 21 U/L (ref 9–39)
BILIRUB SERPL-MCNC: 0.7 MG/DL (ref 0–1.2)
BUN SERPL-MCNC: 15 MG/DL (ref 6–23)
CALCIUM SERPL-MCNC: 9.2 MG/DL (ref 8.6–10.3)
CHLORIDE SERPL-SCNC: 104 MMOL/L (ref 98–107)
CHOLEST SERPL-MCNC: 188 MG/DL (ref 0–199)
CHOLESTEROL/HDL RATIO: 5.6
CO2 SERPL-SCNC: 28 MMOL/L (ref 21–32)
CREAT SERPL-MCNC: 0.73 MG/DL (ref 0.5–1.3)
EGFRCR SERPLBLD CKD-EPI 2021: >90 ML/MIN/1.73M*2
EST. AVERAGE GLUCOSE BLD GHB EST-MCNC: 117 MG/DL
GLUCOSE SERPL-MCNC: 103 MG/DL (ref 74–99)
HBA1C MFR BLD: 5.7 %
HDLC SERPL-MCNC: 33.3 MG/DL
LDLC SERPL CALC-MCNC: 101 MG/DL
NON HDL CHOLESTEROL: 155 MG/DL (ref 0–149)
POTASSIUM SERPL-SCNC: 3.4 MMOL/L (ref 3.5–5.3)
PROT SERPL-MCNC: 7.2 G/DL (ref 6.4–8.2)
SODIUM SERPL-SCNC: 140 MMOL/L (ref 136–145)
TRIGL SERPL-MCNC: 271 MG/DL (ref 0–149)
VLDL: 54 MG/DL (ref 0–40)

## 2024-07-29 PROCEDURE — 83036 HEMOGLOBIN GLYCOSYLATED A1C: CPT

## 2024-07-29 PROCEDURE — RXMED WILLOW AMBULATORY MEDICATION CHARGE

## 2024-07-29 PROCEDURE — 36415 COLL VENOUS BLD VENIPUNCTURE: CPT

## 2024-07-29 PROCEDURE — 80061 LIPID PANEL: CPT

## 2024-07-29 PROCEDURE — 84153 ASSAY OF PSA TOTAL: CPT

## 2024-07-29 PROCEDURE — 80053 COMPREHEN METABOLIC PANEL: CPT

## 2024-07-29 RX ORDER — POTASSIUM CHLORIDE 750 MG/1
10 TABLET, FILM COATED, EXTENDED RELEASE ORAL DAILY
Qty: 30 TABLET | Refills: 3 | Status: SHIPPED | OUTPATIENT
Start: 2024-07-29 | End: 2025-07-29

## 2024-07-31 ENCOUNTER — APPOINTMENT (OUTPATIENT)
Dept: PRIMARY CARE | Facility: CLINIC | Age: 59
End: 2024-07-31
Payer: COMMERCIAL

## 2024-07-31 VITALS
BODY MASS INDEX: 27.28 KG/M2 | DIASTOLIC BLOOD PRESSURE: 87 MMHG | SYSTOLIC BLOOD PRESSURE: 165 MMHG | HEART RATE: 70 BPM | OXYGEN SATURATION: 92 % | WEIGHT: 173.8 LBS | TEMPERATURE: 97.2 F | HEIGHT: 67 IN

## 2024-07-31 DIAGNOSIS — Z00.00 WELL ADULT EXAM: Primary | ICD-10-CM

## 2024-07-31 DIAGNOSIS — R06.83 SNORING: ICD-10-CM

## 2024-07-31 DIAGNOSIS — E11.9 TYPE 2 DIABETES MELLITUS WITHOUT COMPLICATION, WITHOUT LONG-TERM CURRENT USE OF INSULIN (MULTI): ICD-10-CM

## 2024-07-31 DIAGNOSIS — R73.9 ELEVATED BLOOD SUGAR: ICD-10-CM

## 2024-07-31 DIAGNOSIS — I10 PRIMARY HYPERTENSION: ICD-10-CM

## 2024-07-31 DIAGNOSIS — G89.29 CHRONIC PAIN OF BOTH ANKLES: ICD-10-CM

## 2024-07-31 DIAGNOSIS — Z12.5 SCREENING FOR PROSTATE CANCER: ICD-10-CM

## 2024-07-31 DIAGNOSIS — M25.571 CHRONIC PAIN OF BOTH ANKLES: ICD-10-CM

## 2024-07-31 DIAGNOSIS — M25.572 CHRONIC PAIN OF BOTH ANKLES: ICD-10-CM

## 2024-07-31 DIAGNOSIS — Z12.11 COLON CANCER SCREENING: ICD-10-CM

## 2024-07-31 LAB — PSA SERPL-MCNC: 2.78 NG/ML

## 2024-07-31 PROCEDURE — 99396 PREV VISIT EST AGE 40-64: CPT | Performed by: INTERNAL MEDICINE

## 2024-07-31 PROCEDURE — 93000 ELECTROCARDIOGRAM COMPLETE: CPT | Performed by: INTERNAL MEDICINE

## 2024-07-31 PROCEDURE — 3044F HG A1C LEVEL LT 7.0%: CPT | Performed by: INTERNAL MEDICINE

## 2024-07-31 PROCEDURE — 3079F DIAST BP 80-89 MM HG: CPT | Performed by: INTERNAL MEDICINE

## 2024-07-31 PROCEDURE — 3077F SYST BP >= 140 MM HG: CPT | Performed by: INTERNAL MEDICINE

## 2024-07-31 PROCEDURE — 3008F BODY MASS INDEX DOCD: CPT | Performed by: INTERNAL MEDICINE

## 2024-07-31 PROCEDURE — 3049F LDL-C 100-129 MG/DL: CPT | Performed by: INTERNAL MEDICINE

## 2024-07-31 RX ORDER — TIRZEPATIDE 2.5 MG/.5ML
2.5 INJECTION, SOLUTION SUBCUTANEOUS
Qty: 2 ML | Refills: 3 | Status: SHIPPED | OUTPATIENT
Start: 2024-08-04

## 2024-07-31 ASSESSMENT — PATIENT HEALTH QUESTIONNAIRE - PHQ9
1. LITTLE INTEREST OR PLEASURE IN DOING THINGS: NOT AT ALL
2. FEELING DOWN, DEPRESSED OR HOPELESS: NOT AT ALL
SUM OF ALL RESPONSES TO PHQ9 QUESTIONS 1 AND 2: 0

## 2024-07-31 NOTE — H&P (VIEW-ONLY)
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   Had osteomyelitis   Pos snoring   Unsure if apnea   Would like to be off mounjaro at some point   Calc score 2019    206  Review of Nutrition:  Current diet: discussed  Exercise discussed    Gen:  no fever  HEENT:  no trouble swallowing  CV:  no dyspnea, cyanosis  Lungs:  no shortness of breath  GI:  no constipation, no blood in stool  Vascular:  no edema  Neuro:   no weakness  Skin:  no rash  MS:no joint swelling pos ankle pain   Gu:  no urinary complaints  All other systems have been reviewed and are negative for complaint      Screening Questions:  Objective   /87   Pulse 70   Temp 36.2 °C (97.2 °F)   Ht 1.708 m (5' 7.25\")   Wt 78.8 kg (173 lb 12.8 oz)   SpO2 92%   BMI 27.02 kg/m²       General:   alert and oriented, in no acute distress   Gait:   normal   Skin:   normal   Oral cavity:   lips, mucosa, and tongue normal; teeth and gums normal   Eyes:   sclerae white, pupils equal and reactive   Ears:   normal bilaterally Tms grey   Neck:   no adenopathy and thyroid not enlarged, symmetric, no tenderness/mass/nodules   Lungs:  clear to auscultation bilaterally   Heart:   regular rate and rhythm, S1, S2 normal, no murmur, click, rub or gallop   Abdomen:  soft, non-tender; bowel sounds normal; no masses, no organomegaly   : Ne pt declines        Extremities:  extremities normal, warm and well-perfused; no cyanosis, clubbing, or edema,   Neuro:  normal without focal findings and muscle tone and strength normal and symmetric          Kuldeep was seen today for new patient visit.  Diagnoses and all orders for this visit:  Well adult exam (Primary)  Elevated blood sugar  Screening for " prostate cancer  -     Prostate Specific Antigen, Screen; Future  Colon cancer screening  -     Colonoscopy Screening; Average Risk Patient; Future  Snoring  -     Home sleep apnea test (HSAT); Future  Type 2 diabetes mellitus without complication, without long-term current use of insulin (Multi)  -     tirzepatide (Mounjaro) 2.5 mg/0.5 mL pen injector; Inject 2.5 mg under the skin 1 (one) time per week.  Chronic pain of both ankles  -     XR ankle left 3+ views; Future  -     XR ankle right 3+ views; Future  Primary hypertension  -     ECG 12 Lead  Check bp at home , call with numbers after one week   If home bp good, consider dec hydrochlorothiazide component     Chronic conditions reviewed in the assessment and plan.    Continue medications unless specified otherwise.  Previous labs reviewed.   Other specialty provider notes reviewed.    Follow up in 3 months or prn.

## 2024-08-02 ENCOUNTER — PHARMACY VISIT (OUTPATIENT)
Dept: PHARMACY | Facility: CLINIC | Age: 59
End: 2024-08-02
Payer: COMMERCIAL

## 2024-08-02 DIAGNOSIS — Z12.11 COLON CANCER SCREENING: ICD-10-CM

## 2024-08-02 PROCEDURE — RXMED WILLOW AMBULATORY MEDICATION CHARGE

## 2024-08-02 RX ORDER — SODIUM, POTASSIUM,MAG SULFATES 17.5-3.13G
SOLUTION, RECONSTITUTED, ORAL ORAL
Qty: 354 ML | Refills: 0 | Status: SHIPPED | OUTPATIENT
Start: 2024-08-02

## 2024-08-05 ENCOUNTER — APPOINTMENT (OUTPATIENT)
Dept: PHARMACY | Facility: HOSPITAL | Age: 59
End: 2024-08-05
Payer: COMMERCIAL

## 2024-08-05 DIAGNOSIS — E11.65 TYPE 2 DIABETES MELLITUS WITH HYPERGLYCEMIA, WITHOUT LONG-TERM CURRENT USE OF INSULIN (MULTI): Primary | ICD-10-CM

## 2024-08-05 ASSESSMENT — ENCOUNTER SYMPTOMS: DIABETIC ASSOCIATED SYMPTOMS: 0

## 2024-08-05 NOTE — PROGRESS NOTES
[facility] ([facility])    Kuldeep Kemp is a 59 y.o. male was referred to Clinical Pharmacy Team to complete a comprehensive medication review (CMR) with a pharmacist as part of the [employer] diabetes program.  Pharmacy team may also provide assistance in diabetes management per discussion with referring provider and/or endocrinology.    Referring Provider: Di Denise, *  Does patient follow with Endocrinology: No    Subjective   Allergies   Allergen Reactions    Animal Dander Unknown    Cat Dander Unknown    Lisinopril Cough       [facility]  [address]  [facility] [address]  Phone: [phone] Fax: [phone]    [facility] - [place], [place] - [address]  [address] [address]  Phone: [phone] Fax: [phone]      Diabetes  He presents for his follow-up diabetic visit. He has type 2 diabetes mellitus. There are no hypoglycemic associated symptoms. There are no diabetic associated symptoms. Risk factors for coronary artery disease include diabetes mellitus. Current diabetic treatments: Mounjaro 2.5 mg weekly. He is compliant with treatment all of the time.       Objective     There were no vitals taken for this visit.     LAB  Lab Results   Component Value Date    BILITOT 0.7 07/29/2024    CALCIUM 9.2 07/29/2024    CO2 28 07/29/2024     07/29/2024    CREATININE 0.73 07/29/2024    GLUCOSE 103 (H) 07/29/2024    ALKPHOS 50 07/29/2024    K 3.4 (L) 07/29/2024    PROT 7.2 07/29/2024     07/29/2024    AST 21 07/29/2024    ALT 19 07/29/2024    BUN 15 07/29/2024    ANIONGAP 11 07/29/2024    ALBUMIN 4.6 07/29/2024    GFRMALE >90 05/03/2023     Lab Results   Component Value Date    TRIG 271 (H) 07/29/2024    CHOL 188 07/29/2024    LDLCALC 101 (H) 07/29/2024    HDL 33.3 07/29/2024     Lab Results   Component Value Date    HGBA1C 5.7 (H) 07/29/2024       Current  Outpatient Medications on File Prior to Visit   Medication Sig Dispense Refill    ascorbic acid (Vitamin C) 500 mg tablet Take 1 tablet (500 mg) by mouth once daily.      aspirin 81 mg EC tablet Take 1 tablet (81 mg) by mouth once daily.      cholecalciferol (Vitamin D-3) 25 MCG (1000 UT) capsule Take 1 capsule (25 mcg) by mouth once daily.      flash glucose sensor kit kit APPLY 1 SENSOR TO THE BACK OF THE ARM EVERY 14 DAYS TO MONITOR BLOOD SUGAR 2 each 11    glucosamine-chondroitin 500-400 mg tablet Take 1 tablet by mouth 3 times a day.      milk thistle 175 mg tablet Take 1 tablet (175 mg) by mouth once daily.      naproxen sodium (Aleve) 220 mg tablet Take 1 tablet (220 mg) by mouth 3 times a day as needed for mild pain (1 - 3). prn      olmesartan-hydrochlorothiazide (BENIcar HCT) 40-25 mg tablet TAKE 1 TABLET BY MOUTH ONCE DAILY. 30 tablet 11    potassium chloride CR (Klor-Con) 10 mEq ER tablet Take 1 tablet (10 mEq) by mouth once daily. Do not crush, chew, or split. 30 tablet 3    sodium,potassium,mag sulfates (Suprep) 17.5-3.13-1.6 gram recon soln solution Take one bottle twice as directed by the prep instructions 354 mL 0    tirzepatide (Mounjaro) 2.5 mg/0.5 mL pen injector Inject 2.5 mg under the skin 1 (one) time per week. 2 mL 3    triamcinolone (Kenalog) 0.1 % cream Apply to affected area 1-2 times daily as needed. Avoid face and groin. 60 g 1    cwlb-bnpd-tza-vul-atd-maeq-hor [id number] mg tablet Take 1 each by mouth once daily.      [DISCONTINUED] metFORMIN XR (Glucophage-XR) 500 mg 24 hr tablet Take 1 tablet (500 mg) by mouth once daily in the evening. Take with meals. (Patient not taking: Reported on 7/15/2024)      [DISCONTINUED] metFORMIN XR (Glucophage-XR) 500 mg 24 hr tablet Take 2 tablets by mouth twice daily (Patient not taking: Reported on 7/15/2024) 180 tablet 0    [DISCONTINUED] rosuvastatin (Crestor) 10 mg tablet TAKE 1 TABLET (10 MG) BY MOUTH ONCE DAILY. (Patient not taking: Reported  on 7/15/2024) 90 tablet 3    [DISCONTINUED] tirzepatide (Mounjaro) 5 mg/0.5 mL pen injector Inject 5 mg under the skin 1 (one) time per week. 2 mL 3     No current facility-administered medications on file prior to visit.        HISTORICAL PHARMACOTHERAPY (if relevant)  -Metformin  mg: stopped d/t frequent Jacqueline low alarms    CURRENT PHARMACOTHERAPY (if differing from list above)  -Mounjaro 5 mg weekly    Secondary Prevention:  Statin? No (patient stopped d/t ankle pain)  ACE-I/ARB? No  Aspirin? Yes    Pertinent PMH Review:  PMH of Pancreatitis: No  PMH of Retinopathy: No  PMH of Urinary Tract Infections: No  PMH of MTC: No    Glucose Readings:  Glucometer/CGM Type: Jacqueline  Any episodes of hypoglycemia? Jacqueline reports lows as low as 56, but states no symptoms    ASSESSMENT/PLAN:   Employee Health Diabetes Program (VBID)  - Patient enrolled in  Employee diabetes program for $0 co-pays on diabetes medications/supplies. Enrollment should be active in 2-4 weeks.  - Requested VBID enrollment date: 7/15/24  - PharmD Management Level: 3-4  -  Pharmacy fill location: [facility]    Assessment/Plan   Problem List Items Addressed This Visit       Type 2 diabetes mellitus with hyperglycemia (Multi) - Primary     Follow up: 10 months    Continue all meds under the continuation of care with the referring provider and clinical pharmacy team.    Dwayne Raman, PharmD     Verbal consent to manage patient's drug therapy was obtained from [the patient and/or an individual authorized to act on behalf of a patient]. They were informed they may decline to participate or withdraw from participation in pharmacy services at any time.

## 2024-08-06 ENCOUNTER — ANESTHESIA EVENT (OUTPATIENT)
Dept: GASTROENTEROLOGY | Facility: EXTERNAL LOCATION | Age: 59
End: 2024-08-06

## 2024-08-06 ENCOUNTER — PHARMACY VISIT (OUTPATIENT)
Dept: PHARMACY | Facility: CLINIC | Age: 59
End: 2024-08-06
Payer: COMMERCIAL

## 2024-08-10 PROCEDURE — RXMED WILLOW AMBULATORY MEDICATION CHARGE

## 2024-08-12 PROCEDURE — RXMED WILLOW AMBULATORY MEDICATION CHARGE

## 2024-08-15 ENCOUNTER — PHARMACY VISIT (OUTPATIENT)
Dept: PHARMACY | Facility: CLINIC | Age: 59
End: 2024-08-15
Payer: COMMERCIAL

## 2024-08-21 ENCOUNTER — ANESTHESIA (OUTPATIENT)
Dept: GASTROENTEROLOGY | Facility: EXTERNAL LOCATION | Age: 59
End: 2024-08-21

## 2024-08-21 ENCOUNTER — APPOINTMENT (OUTPATIENT)
Dept: GASTROENTEROLOGY | Facility: EXTERNAL LOCATION | Age: 59
End: 2024-08-21
Payer: COMMERCIAL

## 2024-08-21 VITALS
TEMPERATURE: 97.2 F | BODY MASS INDEX: 27.32 KG/M2 | HEIGHT: 66 IN | OXYGEN SATURATION: 98 % | DIASTOLIC BLOOD PRESSURE: 74 MMHG | SYSTOLIC BLOOD PRESSURE: 133 MMHG | RESPIRATION RATE: 12 BRPM | HEART RATE: 80 BPM | WEIGHT: 170 LBS

## 2024-08-21 DIAGNOSIS — Z12.11 COLON CANCER SCREENING: Primary | ICD-10-CM

## 2024-08-21 PROBLEM — F12.90 MARIJUANA SMOKER: Status: ACTIVE | Noted: 2024-08-21

## 2024-08-21 PROBLEM — Z72.0 VAPES NICOTINE CONTAINING SUBSTANCE: Status: ACTIVE | Noted: 2024-08-21

## 2024-08-21 PROCEDURE — 45385 COLONOSCOPY W/LESION REMOVAL: CPT | Performed by: INTERNAL MEDICINE

## 2024-08-21 PROCEDURE — 45380 COLONOSCOPY AND BIOPSY: CPT | Performed by: INTERNAL MEDICINE

## 2024-08-21 RX ORDER — PROPOFOL 10 MG/ML
INJECTION, EMULSION INTRAVENOUS AS NEEDED
Status: DISCONTINUED | OUTPATIENT
Start: 2024-08-21 | End: 2024-08-21

## 2024-08-21 RX ORDER — LIDOCAINE HYDROCHLORIDE 20 MG/ML
INJECTION, SOLUTION INFILTRATION; PERINEURAL AS NEEDED
Status: DISCONTINUED | OUTPATIENT
Start: 2024-08-21 | End: 2024-08-21

## 2024-08-21 RX ORDER — SODIUM CHLORIDE 9 MG/ML
INJECTION, SOLUTION INTRAVENOUS CONTINUOUS PRN
Status: DISCONTINUED | OUTPATIENT
Start: 2024-08-21 | End: 2024-08-21

## 2024-08-21 RX ORDER — SODIUM CHLORIDE 9 MG/ML
20 INJECTION, SOLUTION INTRAVENOUS CONTINUOUS
Status: DISCONTINUED | OUTPATIENT
Start: 2024-08-21 | End: 2024-08-22 | Stop reason: HOSPADM

## 2024-08-21 SDOH — HEALTH STABILITY: MENTAL HEALTH: CURRENT SMOKER: 1

## 2024-08-21 ASSESSMENT — PAIN SCALES - GENERAL
PAINLEVEL_OUTOF10: 0 - NO PAIN
PAINLEVEL_OUTOF10: 0 - NO PAIN
PAIN_LEVEL: 0
PAINLEVEL_OUTOF10: 0 - NO PAIN
PAINLEVEL_OUTOF10: 0 - NO PAIN

## 2024-08-21 ASSESSMENT — PAIN - FUNCTIONAL ASSESSMENT
PAIN_FUNCTIONAL_ASSESSMENT: 0-10

## 2024-08-21 ASSESSMENT — COLUMBIA-SUICIDE SEVERITY RATING SCALE - C-SSRS
2. HAVE YOU ACTUALLY HAD ANY THOUGHTS OF KILLING YOURSELF?: NO
6. HAVE YOU EVER DONE ANYTHING, STARTED TO DO ANYTHING, OR PREPARED TO DO ANYTHING TO END YOUR LIFE?: NO
1. IN THE PAST MONTH, HAVE YOU WISHED YOU WERE DEAD OR WISHED YOU COULD GO TO SLEEP AND NOT WAKE UP?: NO

## 2024-08-21 NOTE — ANESTHESIA POSTPROCEDURE EVALUATION
Patient: Kuldeep Kemp    Procedure Summary       Date: 08/21/24 Room / Location: [facility]    Anesthesia Start: 0800 Anesthesia Stop:     Procedure: COLONOSCOPY Diagnosis:       Colon cancer screening      Colon cancer screening    Scheduled Providers: Thad Dickson MD; NATAN Arreola Responsible Provider: NATAN Arreola    Anesthesia Type: MAC ASA Status: 3            Anesthesia Type: MAC    Vitals Value Taken Time   /64 08/21/24 0823   Temp 36.2 08/21/24 0823   Pulse 73 08/21/24 0823   Resp 17 08/21/24 0823   SpO2 92 08/21/24 0823       Anesthesia Post Evaluation    Patient location during evaluation: bedside  Patient participation: complete - patient participated  Level of consciousness: awake  Pain score: 0  Pain management: adequate  Airway patency: patent  Cardiovascular status: acceptable  Respiratory status: acceptable and room air  Hydration status: acceptable  Postoperative Nausea and Vomiting: none      No notable events documented.

## 2024-08-21 NOTE — DISCHARGE INSTRUCTIONS
Patient Instructions Post Procedure      The anesthetics, sedatives or narcotics which were given to you today will be acting in your body for the next 24 hours, so you might feel a little sleepy or groggy.  This feeling should slowly wear off. Carefully read and follow the instructions.     You received sedation today:  - Do not drive or operate any machinery or power tools of any kind.   - No alcoholic beverages today, not even beer or wine.  - Do not make any important decisions or sign any legal documents.  - No over the counter medications that contain alcohol or that may cause drowsiness.    While it is common to experience mild to moderate abdominal distention, gas, or belching after your procedure, if any of these symptoms occur following discharge from the GI Lab or within one week of having your procedure, call the [facility] [place] to be advised whether a visit to your nearest Urgent Care or Emergency Department is indicated.  Take this paper with you if you go.   - If you develop an allergic reaction to the medications that were given during your procedure such as difficulty breathing, rash, hives, severe nausea, vomiting or lightheadedness.  - If you experience chest pain, shortness of breath, severe abdominal pain, fevers and chills.  -If you develop signs and symptoms of bleeding such as blood in your spit, if your stools turn black, tarry, or bloody  - If you have not urinated within 8 hours following your procedure.  - If your IV site becomes painful, red, inflamed, or looks infected.    If you received a biopsy/polypectomy/sphincterotomy the following instructions apply below:  __ Do not use Aspirin containing products, non-steroidal medications or anti-coagulants for one week following your procedure. (Examples of these types of medications are: Advil, Arthrotec, Aleve, Coumadin, Ecotrin, Heparin, Ibuprofen, Indocin, Motrin, Naprosyn, Nuprin, Plavix, Vioxx, and Voltarin, or their generic  forms.  This list is not all-inclusive.  Check with your physician or pharmacist before resuming medications.)   __ Eat a soft diet today.  Avoid foods that are poorly digested for the next 24 hours.  These foods would include: nuts, beans, lettuce, red meats, and fried foods. Start with liquids and advance your diet as tolerated, gradually work up to eating solids.   __ Do not have a Barium Study or Enema for one week.    Your physician recommends the additional following instructions:    -You have a contact number available for emergencies. The signs and symptoms of potential delayed complications were discussed with you. You may return to normal activities tomorrow.  -Resume your previous diet or other if specified.  -Continue your present medications.   -We are waiting for your pathology results, if applicable.  -The findings and recommendations have been discussed with you and/or family.  - Please see Medication Reconciliation Form for new medication/medications prescribed.     If you experience any problems or have any questions following discharge from the GI Lab, please call: [phone] from 7 am- 4:30 pm.  In the event of an emergency please go to the closest Emergency Department or call  At [phone]

## 2024-08-21 NOTE — ANESTHESIA PREPROCEDURE EVALUATION
Patient: Kuldeep Kemp    Procedure Information       Date/Time: 08/21/24 0730    Scheduled providers: Thad Dickson MD; NONI Arreola-CRNA    Procedure: COLONOSCOPY    Location: [facility]            Relevant Problems   Cardiac   (+) Atypical chest pain   (+) Hyperlipidemia   (+) Hypertension   (+) Hypertriglyceridemia      Pulmonary   (+) Obstructive sleep apnea      Neuro   (+) Cervical radiculitis      GI   (+) Esophageal reflux      Liver   (+) Fatty liver      Endocrine   (+) Subclinical hypothyroidism   (+) Type 2 diabetes mellitus with hyperglycemia (Multi)      Hematology   (+) Anemia      Advance Directives and General Issues   (+) Marijuana smoker      Tobacco   (+) Vapes nicotine containing substance       Clinical information reviewed:    Allergies                NPO Detail:  NPO/Void Status  Date of Last Liquid: 08/21/24  Time of Last Liquid: 0200  Date of Last Solid: 08/20/24  Last Intake Type: Light meal         Physical Exam    Airway  Mallampati: IV  TM distance: <3 FB  Neck ROM: full     Cardiovascular - normal exam     Dental - normal exam     Pulmonary - normal exam     Abdominal   (+) obese         Anesthesia Plan    History of general anesthesia?: yes  History of complications of general anesthesia?: no    ASA 3     MAC   (Patient positioned self to comfort prior to sedation administered; eyes closed; continuous monitoring.  Preoxygenated 2L prior to procedure.)  The patient is a current smoker.  Patient was previously instructed to abstain from smoking on day of procedure.  Patient did not smoke on day of procedure.    intravenous induction   Anesthetic plan and risks discussed with patient.    Plan discussed with CRNA.

## 2024-08-29 LAB
LABORATORY COMMENT REPORT: NORMAL
PATH REPORT.FINAL DX SPEC: NORMAL
PATH REPORT.GROSS SPEC: NORMAL
PATH REPORT.TOTAL CANCER: NORMAL

## 2024-08-30 NOTE — RESULT ENCOUNTER NOTE
Greetings,  Kuldeep Kemp    During recent colonoscopy multiple polyps were seen and completely removed.  Pathology results show these polyps as tubular adenoma.  This kind of polyp is benign but precancerous.  Based on pathology results I recommend repeating colonoscopy in 5 years.  Do not hesitate to contact me if you have any questions or concerns.    Sincerely,   Thad Dickson MD

## 2024-09-09 PROCEDURE — RXMED WILLOW AMBULATORY MEDICATION CHARGE

## 2024-09-11 ENCOUNTER — PHARMACY VISIT (OUTPATIENT)
Dept: PHARMACY | Facility: CLINIC | Age: 59
End: 2024-09-11
Payer: COMMERCIAL

## 2024-09-13 ENCOUNTER — PHARMACY VISIT (OUTPATIENT)
Dept: PHARMACY | Facility: CLINIC | Age: 59
End: 2024-09-13
Payer: COMMERCIAL

## 2024-09-13 PROCEDURE — RXMED WILLOW AMBULATORY MEDICATION CHARGE

## 2024-10-04 PROCEDURE — RXMED WILLOW AMBULATORY MEDICATION CHARGE

## 2024-10-07 PROCEDURE — RXMED WILLOW AMBULATORY MEDICATION CHARGE

## 2024-10-08 ENCOUNTER — PHARMACY VISIT (OUTPATIENT)
Dept: PHARMACY | Facility: CLINIC | Age: 59
End: 2024-10-08
Payer: COMMERCIAL

## 2024-10-29 ENCOUNTER — APPOINTMENT (OUTPATIENT)
Dept: PRIMARY CARE | Facility: CLINIC | Age: 59
End: 2024-10-29
Payer: COMMERCIAL

## 2024-11-01 PROCEDURE — RXMED WILLOW AMBULATORY MEDICATION CHARGE

## 2024-11-05 ENCOUNTER — PHARMACY VISIT (OUTPATIENT)
Dept: PHARMACY | Facility: CLINIC | Age: 59
End: 2024-11-05
Payer: COMMERCIAL

## 2024-11-29 DIAGNOSIS — E11.9 TYPE 2 DIABETES MELLITUS WITHOUT COMPLICATION, WITHOUT LONG-TERM CURRENT USE OF INSULIN (MULTI): ICD-10-CM

## 2024-11-29 PROCEDURE — RXMED WILLOW AMBULATORY MEDICATION CHARGE

## 2024-11-29 RX ORDER — TIRZEPATIDE 2.5 MG/.5ML
2.5 INJECTION, SOLUTION SUBCUTANEOUS
Qty: 2 ML | Refills: 3 | Status: CANCELLED | OUTPATIENT
Start: 2024-12-01

## 2024-11-29 RX ORDER — TIRZEPATIDE 2.5 MG/.5ML
2.5 INJECTION, SOLUTION SUBCUTANEOUS
Qty: 2 ML | Refills: 3 | Status: SHIPPED | OUTPATIENT
Start: 2024-12-01

## 2024-12-04 ENCOUNTER — PHARMACY VISIT (OUTPATIENT)
Dept: PHARMACY | Facility: CLINIC | Age: 59
End: 2024-12-04
Payer: COMMERCIAL

## 2024-12-05 DIAGNOSIS — E87.6 HYPOKALEMIA: ICD-10-CM

## 2024-12-05 PROCEDURE — RXMED WILLOW AMBULATORY MEDICATION CHARGE

## 2024-12-05 RX ORDER — POTASSIUM CHLORIDE 750 MG/1
10 TABLET, FILM COATED, EXTENDED RELEASE ORAL DAILY
Qty: 30 TABLET | Refills: 11 | Status: SHIPPED | OUTPATIENT
Start: 2024-12-05 | End: 2025-12-05

## 2024-12-07 PROCEDURE — RXMED WILLOW AMBULATORY MEDICATION CHARGE

## 2024-12-09 ENCOUNTER — TELEPHONE (OUTPATIENT)
Dept: PRIMARY CARE | Facility: CLINIC | Age: 59
End: 2024-12-09
Payer: COMMERCIAL

## 2024-12-09 NOTE — TELEPHONE ENCOUNTER
Patient requesting appointment for shoulder issues and would like a possible referral.  Please triage.

## 2024-12-10 ENCOUNTER — TELEPHONE (OUTPATIENT)
Dept: PRIMARY CARE | Facility: CLINIC | Age: 59
End: 2024-12-10
Payer: COMMERCIAL

## 2024-12-10 ENCOUNTER — PHARMACY VISIT (OUTPATIENT)
Dept: PHARMACY | Facility: CLINIC | Age: 59
End: 2024-12-10
Payer: COMMERCIAL

## 2024-12-10 NOTE — TELEPHONE ENCOUNTER
Per diabetes guidelines , he should be on a statin.  Is he willing to try crestor for this?  Ldl goal of 70

## 2024-12-10 NOTE — TELEPHONE ENCOUNTER
I called Pt to triage. Pt ahs had 3 left shoulder replacements and now his right shoulder has been hurting him. He jammed it the other day and needs referral for ortho for possible gel injection. Np appointment per PCP.  Pt scheduled tomorrow with MAI NP.

## 2024-12-11 ENCOUNTER — APPOINTMENT (OUTPATIENT)
Dept: RADIOLOGY | Facility: HOSPITAL | Age: 59
End: 2024-12-11
Payer: COMMERCIAL

## 2024-12-11 ENCOUNTER — HOSPITAL ENCOUNTER (EMERGENCY)
Facility: HOSPITAL | Age: 59
Discharge: HOME | End: 2024-12-11
Attending: EMERGENCY MEDICINE
Payer: COMMERCIAL

## 2024-12-11 ENCOUNTER — APPOINTMENT (OUTPATIENT)
Dept: PRIMARY CARE | Facility: CLINIC | Age: 59
End: 2024-12-11
Payer: COMMERCIAL

## 2024-12-11 VITALS
HEIGHT: 66 IN | RESPIRATION RATE: 16 BRPM | WEIGHT: 175 LBS | OXYGEN SATURATION: 96 % | HEART RATE: 74 BPM | DIASTOLIC BLOOD PRESSURE: 110 MMHG | SYSTOLIC BLOOD PRESSURE: 205 MMHG | TEMPERATURE: 98.4 F | BODY MASS INDEX: 28.12 KG/M2

## 2024-12-11 DIAGNOSIS — S46.911A SHOULDER STRAIN, RIGHT, INITIAL ENCOUNTER: Primary | ICD-10-CM

## 2024-12-11 PROCEDURE — 99283 EMERGENCY DEPT VISIT LOW MDM: CPT | Performed by: EMERGENCY MEDICINE

## 2024-12-11 PROCEDURE — 2500000001 HC RX 250 WO HCPCS SELF ADMINISTERED DRUGS (ALT 637 FOR MEDICARE OP): Performed by: EMERGENCY MEDICINE

## 2024-12-11 PROCEDURE — 73030 X-RAY EXAM OF SHOULDER: CPT | Mod: RT

## 2024-12-11 PROCEDURE — 73030 X-RAY EXAM OF SHOULDER: CPT | Mod: RIGHT SIDE | Performed by: RADIOLOGY

## 2024-12-11 PROCEDURE — RXMED WILLOW AMBULATORY MEDICATION CHARGE

## 2024-12-11 RX ORDER — IBUPROFEN 600 MG/1
600 TABLET ORAL ONCE
Status: COMPLETED | OUTPATIENT
Start: 2024-12-11 | End: 2024-12-11

## 2024-12-11 RX ORDER — NAPROXEN 500 MG/1
500 TABLET ORAL
Qty: 30 TABLET | Refills: 0 | Status: SHIPPED | OUTPATIENT
Start: 2024-12-11 | End: 2024-12-27

## 2024-12-11 ASSESSMENT — COLUMBIA-SUICIDE SEVERITY RATING SCALE - C-SSRS
6. HAVE YOU EVER DONE ANYTHING, STARTED TO DO ANYTHING, OR PREPARED TO DO ANYTHING TO END YOUR LIFE?: NO
2. HAVE YOU ACTUALLY HAD ANY THOUGHTS OF KILLING YOURSELF?: NO
1. IN THE PAST MONTH, HAVE YOU WISHED YOU WERE DEAD OR WISHED YOU COULD GO TO SLEEP AND NOT WAKE UP?: NO

## 2024-12-11 ASSESSMENT — PAIN DESCRIPTION - PAIN TYPE: TYPE: ACUTE PAIN

## 2024-12-11 ASSESSMENT — PAIN SCALES - GENERAL: PAINLEVEL_OUTOF10: 10 - WORST POSSIBLE PAIN

## 2024-12-11 ASSESSMENT — PAIN DESCRIPTION - ORIENTATION: ORIENTATION: RIGHT

## 2024-12-11 ASSESSMENT — LIFESTYLE VARIABLES
HAVE PEOPLE ANNOYED YOU BY CRITICIZING YOUR DRINKING: NO
EVER HAD A DRINK FIRST THING IN THE MORNING TO STEADY YOUR NERVES TO GET RID OF A HANGOVER: NO
TOTAL SCORE: 0
EVER FELT BAD OR GUILTY ABOUT YOUR DRINKING: NO
HAVE YOU EVER FELT YOU SHOULD CUT DOWN ON YOUR DRINKING: NO

## 2024-12-11 ASSESSMENT — PAIN DESCRIPTION - LOCATION: LOCATION: SHOULDER

## 2024-12-11 ASSESSMENT — PAIN DESCRIPTION - FREQUENCY: FREQUENCY: CONSTANT/CONTINUOUS

## 2024-12-11 ASSESSMENT — PAIN DESCRIPTION - DESCRIPTORS: DESCRIPTORS: ACHING;SHARP;THROBBING

## 2024-12-11 ASSESSMENT — PAIN - FUNCTIONAL ASSESSMENT: PAIN_FUNCTIONAL_ASSESSMENT: 0-10

## 2024-12-11 NOTE — TELEPHONE ENCOUNTER
Patient called stating that he wanted to cancel his appointment with DG today. He decided to go to the ER instead. However, he is on his way to the pharmacy and is wanting to know if we can send his blood pressure medication in ? Patient Stated he was told that we were going to up his BP med dosage and send it in. Please advise.

## 2024-12-11 NOTE — ED PROVIDER NOTES
HPI   Chief Complaint   Patient presents with    Shoulder Injury       59-year-old male presents with 5-day history of right shoulder pain.  States that he was pushing a heavy cart his front wheels became stuck, jerking his shoulder back.  Had immediate onset of pain and limited range of motion.  He had a similar incident yesterday which significantly worsened his pain.  Denies any numbness or weakness.  No other injuries.              Patient History   Past Medical History:   Diagnosis Date    Essential (primary) hypertension 2017    Hypertension    Personal history of other diseases of the digestive system     History of esophageal reflux    Pyogenic arthritis, unspecified (Multi) 2017    Septic joint of left shoulder region    Snoring     Snoring    Type 2 diabetes mellitus without complications (Multi) 2022    Diabetes mellitus    Unspecified osteoarthritis, unspecified site 06/15/2017    Arthritis     Past Surgical History:   Procedure Laterality Date    ROTATOR CUFF REPAIR  2014    Rotator Cuff Repair    SHOULDER Left      dr pedersen    SHOULDER Left     dr rubi last surgery    TOTAL SHOULDER ARTHROPLASTY  2014    Shoulder Arthroplasty Total Shoulder Replacement     Family History   Adopted: Yes   Problem Relation Name Age of Onset    Other (cardiac disorder) Mother          unknown    Other (malignant neoplasm of stomach) Mother      No Known Problems Father          unknown     Social History     Tobacco Use    Smoking status: Former     Current packs/day: 0.00     Types: Cigarettes     Quit date: 2023     Years since quittin.9     Passive exposure: Past    Smokeless tobacco: Never   Vaping Use    Vaping status: Some Days   Substance Use Topics    Alcohol use: Yes     Alcohol/week: 6.0 standard drinks of alcohol     Types: 6 Cans of beer per week     Comment: social can go weeks without having    Drug use: Yes     Types: Marijuana     Comment: vapes        Physical Exam   ED Triage Vitals [12/11/24 0916]   Temperature Heart Rate Respirations BP   36.9 °C (98.4 °F) 74 16 (!) 205/110      Pulse Ox Temp Source Heart Rate Source Patient Position   96 % Temporal -- --      BP Location FiO2 (%)     -- --       Physical Exam  Constitutional:       Appearance: Normal appearance.   HENT:      Head: Atraumatic.   Musculoskeletal:      Cervical back: No tenderness.      Comments: Right shoulder range of motion limited by pain.  No gross deformity or point tenderness.  Pain most notable with external rotation.   Skin:     General: Skin is warm and dry.   Neurological:      General: No focal deficit present.      Mental Status: He is alert.      Sensory: No sensory deficit.      Motor: No weakness.           ED Course & MDM                  No data recorded                                 Medical Decision Making  Patient presenting with right shoulder pain after minor injury.  Exam most consistent with rotator cuff strain.  X-ray showing degenerative change as well as calcific tendinitis of the rotator cuff.  Will treat with NSAIDs, ice, and sling for support as needed.  Referral to Ortho and/or sports medicine for follow-up care.        Procedure  Procedures     Jason Carbajal MD  12/11/24 1020       Jason Carbajal MD  12/11/24 1130

## 2024-12-11 NOTE — ED TRIAGE NOTES
Pt states that he was pushing a cart and the front wheels went into a hole and his arm jammed backwards, pt is c/o right shoulder pain, pt states history of HTN

## 2024-12-12 ENCOUNTER — PHARMACY VISIT (OUTPATIENT)
Dept: PHARMACY | Facility: CLINIC | Age: 59
End: 2024-12-12
Payer: COMMERCIAL

## 2024-12-13 ENCOUNTER — APPOINTMENT (OUTPATIENT)
Dept: PRIMARY CARE | Facility: CLINIC | Age: 59
End: 2024-12-13
Payer: COMMERCIAL

## 2024-12-16 ENCOUNTER — APPOINTMENT (OUTPATIENT)
Dept: PRIMARY CARE | Facility: CLINIC | Age: 59
End: 2024-12-16
Payer: COMMERCIAL

## 2024-12-16 ENCOUNTER — HOSPITAL ENCOUNTER (OUTPATIENT)
Dept: RADIOLOGY | Facility: CLINIC | Age: 59
Discharge: HOME | End: 2024-12-16
Payer: COMMERCIAL

## 2024-12-16 VITALS
TEMPERATURE: 98.4 F | HEIGHT: 66 IN | OXYGEN SATURATION: 95 % | BODY MASS INDEX: 28.93 KG/M2 | SYSTOLIC BLOOD PRESSURE: 148 MMHG | WEIGHT: 180 LBS | DIASTOLIC BLOOD PRESSURE: 90 MMHG | HEART RATE: 80 BPM

## 2024-12-16 DIAGNOSIS — R20.2 NUMBNESS AND TINGLING IN BOTH HANDS: ICD-10-CM

## 2024-12-16 DIAGNOSIS — R20.0 NUMBNESS AND TINGLING IN BOTH HANDS: ICD-10-CM

## 2024-12-16 DIAGNOSIS — Z78.9 TAKES DIETARY SUPPLEMENTS: ICD-10-CM

## 2024-12-16 DIAGNOSIS — I10 PRIMARY HYPERTENSION: Primary | ICD-10-CM

## 2024-12-16 DIAGNOSIS — E53.8 VITAMIN B12 DEFICIENCY: ICD-10-CM

## 2024-12-16 PROCEDURE — 3049F LDL-C 100-129 MG/DL: CPT | Performed by: NURSE PRACTITIONER

## 2024-12-16 PROCEDURE — 3008F BODY MASS INDEX DOCD: CPT | Performed by: NURSE PRACTITIONER

## 2024-12-16 PROCEDURE — 3077F SYST BP >= 140 MM HG: CPT | Performed by: NURSE PRACTITIONER

## 2024-12-16 PROCEDURE — 99214 OFFICE O/P EST MOD 30 MIN: CPT | Performed by: NURSE PRACTITIONER

## 2024-12-16 PROCEDURE — 1036F TOBACCO NON-USER: CPT | Performed by: NURSE PRACTITIONER

## 2024-12-16 PROCEDURE — 3044F HG A1C LEVEL LT 7.0%: CPT | Performed by: NURSE PRACTITIONER

## 2024-12-16 PROCEDURE — 3080F DIAST BP >= 90 MM HG: CPT | Performed by: NURSE PRACTITIONER

## 2024-12-16 PROCEDURE — 73110 X-RAY EXAM OF WRIST: CPT | Mod: BILATERAL PROCEDURE | Performed by: RADIOLOGY

## 2024-12-16 PROCEDURE — 73110 X-RAY EXAM OF WRIST: CPT | Mod: 50

## 2024-12-16 NOTE — PROGRESS NOTES
"Problem List Items Addressed This Visit          Medium    Hypertension - Primary    Overview     ARB-hydrochlorothiazide  12/16/24: no med change today. Will see him in 2 weeks for home cuff check and review of readings. Also stop          Current Assessment & Plan     I've also asked him to stop moringa powder for now and to try and cut down on caffeine         Vitamin B12 deficiency    Current Assessment & Plan     Hands are numb/tingling  R/o B12 def  - though suspect arthritis v carpel tunnel           Other Visit Diagnoses       Numbness and tingling in both hands        check B12  try cock-up brace  get XR  - ? arthritis  may need neuro & EMG  or ortho hand ? injection    Relevant Orders    Vitamin B12    XR wrist 3+ views bilateral    Takes dietary supplements        he would like c/s with integrative med    Relevant Orders    Referral to [facility]             Subjective   Patient ID: Kuldeep Kemp is a 59 y.o. male who presents for bp med dosage (Bp med dosage/Can we order a nutritional blood test).  HPI  ED 12/11/24  /110    HTN  Benicar 40-25 mg every day   Supplements:  Moringa powder  Beet root  Turmeric    Doesn't check at home  Cuff hasn't been checked   Caffeine > 4 c daily    Review of Systems   All other systems reviewed and are negative.      BP Readings from Last 3 Encounters:   12/16/24 148/90   12/11/24 (!) 205/110   08/21/24 133/74      Wt Readings from Last 3 Encounters:   12/16/24 81.6 kg (180 lb)   12/11/24 79.4 kg (175 lb)   08/21/24 77.1 kg (170 lb)      BMI:   Estimated body mass index is 29.05 kg/m² as calculated from the following:    Height as of this encounter: 1.676 m (5' 6\").    Weight as of this encounter: 81.6 kg (180 lb).    Objective   Physical Exam  Constitutional:       General: He is not in acute distress.  HENT:      Head: Normocephalic and atraumatic.      Nose: Nose normal.      Mouth/Throat:      Mouth: Mucous membranes are moist.   Eyes:      " Extraocular Movements: Extraocular movements intact.      Conjunctiva/sclera: Conjunctivae normal.   Neck:      Vascular: No carotid bruit.   Cardiovascular:      Rate and Rhythm: Normal rate and regular rhythm.      Pulses: Normal pulses.   Pulmonary:      Effort: Pulmonary effort is normal.      Breath sounds: Normal breath sounds.   Musculoskeletal:         General: Normal range of motion.      Cervical back: Normal range of motion and neck supple.   Skin:     General: Skin is warm and dry.   Neurological:      General: No focal deficit present.      Mental Status: He is alert.   Psychiatric:         Mood and Affect: Mood normal.

## 2024-12-18 PROBLEM — M19.031 PRIMARY OSTEOARTHRITIS OF BOTH WRISTS: Status: ACTIVE | Noted: 2024-12-18

## 2024-12-18 PROBLEM — M19.032 PRIMARY OSTEOARTHRITIS OF BOTH WRISTS: Status: ACTIVE | Noted: 2024-12-18

## 2024-12-20 DIAGNOSIS — I10 PRIMARY HYPERTENSION: Primary | ICD-10-CM

## 2024-12-20 RX ORDER — AMLODIPINE BESYLATE 5 MG/1
5 TABLET ORAL NIGHTLY
Qty: 30 TABLET | Refills: 5 | Status: SHIPPED | OUTPATIENT
Start: 2024-12-20 | End: 2024-12-26 | Stop reason: ALTCHOICE

## 2024-12-20 NOTE — TELEPHONE ENCOUNTER
Patient called states he was seen Monday and was told to inform you if BP has gone down   It in fact has not, latest number are 201/102 and 177/96.  He said he feels fine no pain no sweating   Please advise

## 2024-12-23 ENCOUNTER — APPOINTMENT (OUTPATIENT)
Dept: PRIMARY CARE | Facility: CLINIC | Age: 59
End: 2024-12-23
Payer: COMMERCIAL

## 2024-12-23 VITALS
BODY MASS INDEX: 29.83 KG/M2 | HEIGHT: 66 IN | OXYGEN SATURATION: 95 % | HEART RATE: 87 BPM | WEIGHT: 185.6 LBS | DIASTOLIC BLOOD PRESSURE: 92 MMHG | TEMPERATURE: 97.5 F | SYSTOLIC BLOOD PRESSURE: 168 MMHG

## 2024-12-23 DIAGNOSIS — R22.9 SKIN NODULE: ICD-10-CM

## 2024-12-23 DIAGNOSIS — L30.9 DERMATITIS: ICD-10-CM

## 2024-12-23 DIAGNOSIS — I10 PRIMARY HYPERTENSION: Primary | ICD-10-CM

## 2024-12-23 DIAGNOSIS — L40.50 PSORIATIC ARTHROPATHY (MULTI): ICD-10-CM

## 2024-12-23 PROCEDURE — 99214 OFFICE O/P EST MOD 30 MIN: CPT | Performed by: NURSE PRACTITIONER

## 2024-12-23 PROCEDURE — 1036F TOBACCO NON-USER: CPT | Performed by: NURSE PRACTITIONER

## 2024-12-23 PROCEDURE — 3077F SYST BP >= 140 MM HG: CPT | Performed by: NURSE PRACTITIONER

## 2024-12-23 PROCEDURE — 3008F BODY MASS INDEX DOCD: CPT | Performed by: NURSE PRACTITIONER

## 2024-12-23 PROCEDURE — 3049F LDL-C 100-129 MG/DL: CPT | Performed by: NURSE PRACTITIONER

## 2024-12-23 PROCEDURE — 3044F HG A1C LEVEL LT 7.0%: CPT | Performed by: NURSE PRACTITIONER

## 2024-12-23 PROCEDURE — 3080F DIAST BP >= 90 MM HG: CPT | Performed by: NURSE PRACTITIONER

## 2024-12-23 RX ORDER — TRIAMCINOLONE ACETONIDE 1 MG/G
CREAM TOPICAL
Qty: 453.6 G | Refills: 1 | Status: SHIPPED | OUTPATIENT
Start: 2024-12-23

## 2024-12-23 ASSESSMENT — PATIENT HEALTH QUESTIONNAIRE - PHQ9
1. LITTLE INTEREST OR PLEASURE IN DOING THINGS: NOT AT ALL
SUM OF ALL RESPONSES TO PHQ9 QUESTIONS 1 AND 2: 0
2. FEELING DOWN, DEPRESSED OR HOPELESS: NOT AT ALL

## 2024-12-23 NOTE — PROGRESS NOTES
"Problem List Items Addressed This Visit          Medium    Dermatitis    Overview     Sees derm  Uses kenalog prn          Relevant Medications    triamcinolone (Kenalog) 0.1 % cream    Hypertension - Primary    Overview     ARB-hydrochlorothiazide  12/16/24: no med change today. Will see him in 2 weeks for home cuff check and review of readings. Also stop moringo supp  12/20/24: added in norvasc 5 mg HS   12/23/24: Home cuff: 197/103 hr 86  Office cuff: 175/95  hr 87         Current Assessment & Plan     Just started norvasc HS x couple days  Will see how he's reading on Thursday  If not beginning to trend down will increase norvasc to 10 mg HS          Psoriatic arthropathy (Multi)    Current Assessment & Plan     Is scheduled upcoming with ortho shoulder  Will likely need ortho hand also           Other Visit Diagnoses       Skin nodule        L neck and R LE  follow up with derm    Relevant Orders    Referral to Dermatology             Subjective   Patient ID: Kuldeep Kemp is a 59 y.o. male who presents for Follow-up (BP follow up/Pt brought in home cuff and bp readings).  HPI  Home cuff: 197/103 hr 86  Office cuff: 175/95  hr 87    Review of Systems   All other systems reviewed and are negative.      BP Readings from Last 3 Encounters:   12/23/24 (!) 168/92   12/16/24 148/90   12/11/24 (!) 205/110      Wt Readings from Last 3 Encounters:   12/23/24 84.2 kg (185 lb 9.6 oz)   12/16/24 81.6 kg (180 lb)   12/11/24 79.4 kg (175 lb)      BMI:   Estimated body mass index is 29.96 kg/m² as calculated from the following:    Height as of this encounter: 1.676 m (5' 6\").    Weight as of this encounter: 84.2 kg (185 lb 9.6 oz).    Objective   Physical Exam  Constitutional:       General: He is not in acute distress.  HENT:      Head: Normocephalic and atraumatic.      Right Ear: Tympanic membrane and external ear normal.      Left Ear: Tympanic membrane and external ear normal.      Nose: Nose normal.      Mouth/Throat: "      Mouth: Mucous membranes are moist.   Eyes:      Extraocular Movements: Extraocular movements intact.      Conjunctiva/sclera: Conjunctivae normal.   Cardiovascular:      Rate and Rhythm: Normal rate and regular rhythm.      Pulses: Normal pulses.   Pulmonary:      Effort: Pulmonary effort is normal.      Breath sounds: Normal breath sounds.   Musculoskeletal:         General: Normal range of motion.      Cervical back: Normal range of motion and neck supple.   Skin:     General: Skin is warm and dry.   Neurological:      General: No focal deficit present.      Mental Status: He is alert.   Psychiatric:         Mood and Affect: Mood normal.

## 2024-12-23 NOTE — ASSESSMENT & PLAN NOTE
Just started norvasc HS x couple days  Will see how he's reading on Thursday  If not beginning to trend down will increase norvasc to 10 mg HS

## 2024-12-25 PROCEDURE — RXMED WILLOW AMBULATORY MEDICATION CHARGE

## 2024-12-26 ENCOUNTER — APPOINTMENT (OUTPATIENT)
Dept: ORTHOPEDIC SURGERY | Facility: CLINIC | Age: 59
End: 2024-12-26
Payer: COMMERCIAL

## 2024-12-26 ENCOUNTER — TELEPHONE (OUTPATIENT)
Dept: PRIMARY CARE | Facility: CLINIC | Age: 59
End: 2024-12-26

## 2024-12-26 DIAGNOSIS — S46.911A SHOULDER STRAIN, RIGHT, INITIAL ENCOUNTER: ICD-10-CM

## 2024-12-26 DIAGNOSIS — G89.29 CHRONIC RIGHT SHOULDER PAIN: ICD-10-CM

## 2024-12-26 DIAGNOSIS — S46.011A ROTATOR CUFF STRAIN, RIGHT, INITIAL ENCOUNTER: Primary | ICD-10-CM

## 2024-12-26 DIAGNOSIS — I10 PRIMARY HYPERTENSION: ICD-10-CM

## 2024-12-26 DIAGNOSIS — M25.511 CHRONIC RIGHT SHOULDER PAIN: ICD-10-CM

## 2024-12-26 PROCEDURE — 3044F HG A1C LEVEL LT 7.0%: CPT | Performed by: STUDENT IN AN ORGANIZED HEALTH CARE EDUCATION/TRAINING PROGRAM

## 2024-12-26 PROCEDURE — 1036F TOBACCO NON-USER: CPT | Performed by: STUDENT IN AN ORGANIZED HEALTH CARE EDUCATION/TRAINING PROGRAM

## 2024-12-26 PROCEDURE — 3049F LDL-C 100-129 MG/DL: CPT | Performed by: STUDENT IN AN ORGANIZED HEALTH CARE EDUCATION/TRAINING PROGRAM

## 2024-12-26 PROCEDURE — RXMED WILLOW AMBULATORY MEDICATION CHARGE

## 2024-12-26 PROCEDURE — 99203 OFFICE O/P NEW LOW 30 MIN: CPT | Performed by: STUDENT IN AN ORGANIZED HEALTH CARE EDUCATION/TRAINING PROGRAM

## 2024-12-26 RX ORDER — AMLODIPINE BESYLATE 10 MG/1
10 TABLET ORAL DAILY
Qty: 30 TABLET | Refills: 5 | Status: SHIPPED | OUTPATIENT
Start: 2024-12-26 | End: 2025-06-24

## 2024-12-26 SDOH — SOCIAL STABILITY: SOCIAL NETWORK: SOCIAL ACTIVITY:: 1

## 2024-12-26 NOTE — PROGRESS NOTES
New Consult/New Patient Note    12/26/2024   Jason Carbajal MD    Assessment:  Patient is a pleasant 59 year old male with a history of L shoulder arthroplasty and 2 revisions now presenting with right shoulder pain. Patient's pain generator likely rotator cuff strain.   Patient is complaining more of limited mobility rather than pain.   - Right shoulder rotator cuff strain  - Right shoulder pain     PLAN:  1)  Imaging/Diagnostic Studies: Reviewed and personally interpreted Right Xray of shoulder; notable for calcific tendinitis of rotator cuff and moderate osteoarthritis. Reviewed with patient.   2)  Therapy/Rehabilitation: Ordered physical therapy.  3)  Pharmacological Management: Discussed possible medication options, patient is not interested at this time. Patient will continue OTC.   4)  Spine/Surgical Interventions: None at this time.   5)  Alternative Treatments: May consider alternative treatment options in the future including manipulation (chiropractor versus osteopathic) and/or acupuncture if patient does not obtain optimal relief with initial treatment plan.  6)  Consultations:  Physical Therapy  7)  Follow -up: 4-6 weeks or PRN if symptoms worsen/do not improve.   8)  Future treatment considerations: Consider shoulder injection R shoulder subacromial, vs alternative therapies, medication management.     Patient advised of the difference between hurt and harm and advised to continue with all normal activities and exercises. Patient verbalized understanding of the above plan and was happy with the care provided.      The above clinical summary has been dictated with voice recognition software. It has not been proofread for grammatical errors, typographical mistakes, or other semantic inconsistencies.    Thank you for visiting our office today. It was our pleasure to take part in your healthcare.     Do not hesitate to call with any questions regarding your plan of care after leaving at [phone]    To clinicians, thank you very much for this kind referral. It is a privilege to partner with you in the care of your patients. My office would be delighted to assist you with any further consultations or with questions regarding the plan of care outlined. Do not hesitate to call the office or contact me directly.     Sincerely,    Tish Patino MD  Fellow [facility], PM&R  [facility] [employer]  [facility] [place]     Patient was pushing a heavy cart 12/8/24 and it jerked back and then it happened again and he had intense pain 12/11/24 after which patient went to the ER. Patient is a hurt and has pain when reaching out overhead. Patient has had 3 shoulder surgeries in the left shoulder with Dr Marmolejo, 1 replacement and 2 revisions requiring washout s/p infection. Patient has a history of oxycodone use which ended years ago after his final shoulder surgery on the L shoulder. Patient has limited range of motion on the left side but no longer has pain in that shoulder after the last surgery.   Patient's pain now is in his right shoulder. Patient has a history of psoriatic arthritis. Patient has adverse reaction to capsaicin products. Patient is also sensitive to oral medications.     Kuldeep Kemp   is a 59 y.o. male who presents with   Location: anterior shoulder   Radiation: radiates up and down   Quality:  current 1/10,  at its worst  9-10/10  Exacerbated by reaching out  Relieved by rest  Onset, traumatic event: cart moving back  Has tried:  Tylenol, NSAIDs  Smoker:  twice year   Does not wake them at night- sometimes when he moves wrong  Litigation: no    Patient denies bowel/bladder incontinence, denies fever, denies unintentional weight loss, denies clumsiness of hands, feet, or dropping things.  Denies any constitutional or myelopathic symptomatology.      PREVIOUS TREATMENTS  IN THE LAST SIX MONTHS     Active conservative therapy  in the  last six months (see below)              1. Physical therapy:        no                                                                             2. Home exercise program after PT:     no                                                 3. A physician supervised home exercise program (HEP):   no              4. Chiropractic Care:                        no                                              Passive conservative therapy  in the last six months (see below)              1. NSAIDS:                sometimes                                                                                           2. Prescription pain medication:  no                                                            3. Acupuncture:       no                                                                                      4. Tens unit:  yes     Assistive Devices: no    Work status: works at [employer]       ROS: Other than listed in HPI, PMHX below, and intake paperwork including a 30 point patient-recorded review of symptoms which was personally reviewed and inclusive of no history of unintentional weight loss, change in appetite, significant malaise, fevers, chills, or change in bowel/bladder, shortness of breath, or chest pain.    I have confirmed and edited as necessary Past Medical, Past Surgical, Family, Social History and ROS as obtained by others. These were also obtained on new patient forms.      PHYSICAL EXAM:   GENERAL APPEARANCE:  Well nourished, well developed, and no apparent distress.  NEURO PSYCH: Patient oriented to person, place, Mood pleasant. Benign affect.  MUSCULOSKELETAL and NEUROLOGICAL       VISUAL INSPECTION          CERVICAL: WNL  SPINE ROM:   CERVICAL ROM: full ROM      PALPATION:           SPINOUS PROCESS: no ttp           PARASPINALS: no ttp  MUSCLE BULK: Normal and symmetrical in the upper extremities.  MUSCLE TONE: Normal  MOTOR: 5/5 in all muscle groups tested in bilateral upper extremities   SENSORY: Normal  sensory exam to light touch  GAIT: Normal.   REFLEXES: +1 to bilateral U extremities  LONG TRACT SIGNS: No clonus, Neg Hoffmans.  SPURLING'S TEST: Negative  SHOULDER ROM: Abduction on R shoulder to 70, L shoulder to 80. Forward flexion to 90 degrees on both.   R shoulder ROM with end range pain and positive impingment.  Empty/full can test pos on R side, Labral tests negative. Scarf test positive, limited range, internal rotation to lower lumbar spine.       DATA REVIEW:   The below imaging studies were personally reviewed and discussed with the patient.    Medical Decision Making:  The above note constitutes a Moderate to High level of medical decision making based on past data and imaging review, new and chronic symptoms with exacerbation, change in weakness or sensation, new imaging and diagnostic studies ordered, discussion of potential interventional or surgical treatment options, acute or chronic pain that may pose a threat to bodily function.    Past Medical History:   Diagnosis Date    Essential (primary) hypertension 06/14/2017    Hypertension    Personal history of other diseases of the digestive system     History of esophageal reflux    Pyogenic arthritis, unspecified 04/17/2017    Septic joint of left shoulder region    Snoring     Snoring    Type 2 diabetes mellitus without complications (Multi) 06/14/2022    Diabetes mellitus    Unspecified osteoarthritis, unspecified site 06/15/2017    Arthritis       Medication Documentation Review Audit       Reviewed by Anika Cortes RN (Registered Nurse) on 12/26/24 at 1409      Medication Order Taking? Sig Documenting Provider Last Dose Status   amLODIPine (Norvasc) 5 mg tablet [id number]  Take 1 tablet (5 mg) by mouth once daily at bedtime. Ronda Wilcox, APRN-CNP  Active   ascorbic acid (Vitamin C) 500 mg tablet [id number] No Take 1 tablet (500 mg) by mouth once daily. Historical Provider, MD 8/20/2024 Active   aspirin 81 mg EC tablet [id number]  Take 1 tablet (81  mg) by mouth once daily. Historical Provider, MD  Active   cholecalciferol (Vitamin D-3) 25 MCG (1000 UT) capsule [id number] No Take 1 capsule (25 mcg) by mouth once daily. Historical Provider, MD 2024 Active   flash glucose sensor kit kit [id number] No APPLY 1 SENSOR TO THE BACK OF THE ARM EVERY 14 DAYS TO MONITOR BLOOD SUGAR Chanda Munoz DO Taking  24 2359   glucosamine-chondroitin 500-400 mg tablet [id number] No Take 1 tablet by mouth 3 times a day. Historical Provider, MD 2024 Active   milk thistle 175 mg tablet [id number] No Take 1 tablet (175 mg) by mouth once daily. Historical Provider, MD 2024 Active   naproxen (Naprosyn) 500 mg tablet [id number]  Take 1 tablet (500 mg) by mouth 2 times daily (morning and late afternoon) for 15 days.   Patient taking differently: Take 1 tablet (500 mg) by mouth 2 times daily (morning and late afternoon). prn    Jason Carbajal MD  Active   olmesartan-hydrochlorothiazide (BENIcar HCT) 40-25 mg tablet [id number] No TAKE 1 TABLET BY MOUTH ONCE DAILY. Roman Rod DO 2024 Active   potassium chloride CR (Klor-Con) 10 mEq ER tablet [id number]  Take 1 tablet (10 mEq) by mouth once daily. Do not crush, chew, or split. Di Denise MD  Active   tirzepatide (Mounjaro) 2.5 mg/0.5 mL pen injector [id number]  Inject 2.5 mg under the skin 1 (one) time per week. Di Denise MD  Active   triamcinolone (Kenalog) 0.1 % cream [id number]  Apply to affected area 1-2 times daily as needed. Avoid face and groin. NONI Shell-CNP  Active   TURMERIC ORAL [id number]  Take 1 tablet by mouth once daily. Historical Provider, MD  Active                    Allergies   Allergen Reactions    Animal Dander Unknown    Cat Dander Unknown    Lisinopril Cough       Social History     Socioeconomic History    Marital status:      Spouse name: Not on file    Number of children: Not on file    Years of education: Not on file    Highest education  level: Not on file   Occupational History    Not on file   Tobacco Use    Smoking status: Former     Current packs/day: 0.00     Types: Cigarettes     Quit date: 2023     Years since quittin.9     Passive exposure: Past    Smokeless tobacco: Never   Vaping Use    Vaping status: Some Days   Substance and Sexual Activity    Alcohol use: Yes     Alcohol/week: 6.0 standard drinks of alcohol     Types: 6 Cans of beer per week     Comment: social can go weeks without having    Drug use: Yes     Types: Marijuana     Comment: vapes    Sexual activity: Defer   Other Topics Concern    Not on file   Social History Narrative    Not on file     Social Drivers of Health     Financial Resource Strain: Not on file   Food Insecurity: Not on file   Transportation Needs: Not on file   Physical Activity: Not on file   Stress: Not on file   Social Connections: Not on file   Intimate Partner Violence: Not on file   Housing Stability: Not on file       Past Surgical History:   Procedure Laterality Date    ROTATOR CUFF REPAIR  2014    Rotator Cuff Repair    SHOULDER Left      dr pedersen    SHOULDER Left     dr rubi last surgery    TOTAL SHOULDER ARTHROPLASTY  2014    Shoulder Arthroplasty Total Shoulder Replacement

## 2024-12-26 NOTE — TELEPHONE ENCOUNTER
----- Message from Ronda Wilcox sent at 12/23/2024  2:33 PM EST -----  Regarding: blood pressure  Please call Juan on Thursday and get his most recent BP/HR readings

## 2024-12-26 NOTE — TELEPHONE ENCOUNTER
12/24 - 169/98 HR 86  12/25 - Did not do  12/26 - 172/94 HR 87  Did BP just now which was 176/90 HR 84

## 2024-12-30 ENCOUNTER — PHARMACY VISIT (OUTPATIENT)
Dept: PHARMACY | Facility: CLINIC | Age: 59
End: 2024-12-30
Payer: COMMERCIAL

## 2025-01-09 ENCOUNTER — APPOINTMENT (OUTPATIENT)
Dept: PRIMARY CARE | Facility: CLINIC | Age: 60
End: 2025-01-09
Payer: COMMERCIAL

## 2025-01-09 VITALS
HEART RATE: 80 BPM | TEMPERATURE: 97.9 F | OXYGEN SATURATION: 97 % | DIASTOLIC BLOOD PRESSURE: 83 MMHG | BODY MASS INDEX: 29.73 KG/M2 | HEIGHT: 66 IN | WEIGHT: 185 LBS | SYSTOLIC BLOOD PRESSURE: 173 MMHG

## 2025-01-09 DIAGNOSIS — I10 PRIMARY HYPERTENSION: Primary | ICD-10-CM

## 2025-01-09 DIAGNOSIS — L30.9 DERMATITIS: ICD-10-CM

## 2025-01-09 DIAGNOSIS — E03.8 SUBCLINICAL HYPOTHYROIDISM: ICD-10-CM

## 2025-01-09 DIAGNOSIS — E11.65 TYPE 2 DIABETES MELLITUS WITH HYPERGLYCEMIA, WITHOUT LONG-TERM CURRENT USE OF INSULIN: ICD-10-CM

## 2025-01-09 PROCEDURE — RXMED WILLOW AMBULATORY MEDICATION CHARGE

## 2025-01-09 PROCEDURE — 1036F TOBACCO NON-USER: CPT | Performed by: NURSE PRACTITIONER

## 2025-01-09 PROCEDURE — 99214 OFFICE O/P EST MOD 30 MIN: CPT | Performed by: NURSE PRACTITIONER

## 2025-01-09 PROCEDURE — 3077F SYST BP >= 140 MM HG: CPT | Performed by: NURSE PRACTITIONER

## 2025-01-09 PROCEDURE — 3079F DIAST BP 80-89 MM HG: CPT | Performed by: NURSE PRACTITIONER

## 2025-01-09 PROCEDURE — 3008F BODY MASS INDEX DOCD: CPT | Performed by: NURSE PRACTITIONER

## 2025-01-09 RX ORDER — CHLORTHALIDONE 25 MG/1
25 TABLET ORAL DAILY
Qty: 30 TABLET | Refills: 2 | Status: SHIPPED | OUTPATIENT
Start: 2025-01-09 | End: 2026-01-09

## 2025-01-09 RX ORDER — AMLODIPINE AND OLMESARTAN MEDOXOMIL 5; 40 MG/1; MG/1
1 TABLET ORAL DAILY
Qty: 30 TABLET | Refills: 3 | Status: SHIPPED | OUTPATIENT
Start: 2025-01-09

## 2025-01-09 RX ORDER — TRIAMCINOLONE ACETONIDE 1 MG/G
CREAM TOPICAL
Qty: 453.6 G | Refills: 1 | Status: SHIPPED | OUTPATIENT
Start: 2025-01-09

## 2025-01-09 RX ORDER — DEXAMETHASONE 1 MG/1
1 TABLET ORAL ONCE
Qty: 1 TABLET | Refills: 0 | Status: SHIPPED | OUTPATIENT
Start: 2025-01-09 | End: 2025-01-11

## 2025-01-09 NOTE — PROGRESS NOTES
"Problem List Items Addressed This Visit          Medium    Dermatitis    Overview     Sees derm  Uses kenalog prn          Relevant Medications    triamcinolone (Kenalog) 0.1 % cream    Hypertension - Primary    Overview     Benicar - wo BP control  Lisinopril - cough  Norvasc 10 mg  - edema   12/16/24: no med change today. Will see him in 2 weeks for home cuff check and review of readings. Also stop moringo supp  12/20/24: added in norvasc 5 mg HS   12/23/24: Home cuff: 197/103 hr 86  Office cuff: 175/95  hr 87  1/9/25: stop norvasc 10 mg every day and benicar. Start jessica and chlorthalidone. Fu with pharmd. Also check labs for secondary causes. May need renal US         Relevant Medications    amlodipine-olmesartan (Jessica) 5-40 mg tablet    chlorthalidone (Hygroton) 25 mg tablet    dexAMETHasone (Decadron) 1 mg tablet    Other Relevant Orders    Aldosterone    Renin Activity    Aldosterone/Renin Activity Ratio    Dexamethasone    Referral to Clinical Pharmacy    Subclinical hypothyroidism    Relevant Orders    TSH with reflex to Free T4 if abnormal    Type 2 diabetes mellitus with hyperglycemia (Multi)    Overview     On mounjaro         Relevant Orders    Hemoglobin A1c    Referral to Clinical Pharmacy        Subjective   Patient ID: Kuldeep Kemp is a 59 y.o. male who presents for Hypertension.  HPI  HTN  Not really checking at home  Mother in law's health   Taking anti hypertensives HS  Increased dose norvasc causing edema      Review of Systems   All other systems reviewed and are negative.      BP Readings from Last 3 Encounters:   01/09/25 173/83   12/23/24 (!) 168/92   12/16/24 148/90      Wt Readings from Last 3 Encounters:   01/09/25 83.9 kg (185 lb)   12/23/24 84.2 kg (185 lb 9.6 oz)   12/16/24 81.6 kg (180 lb)      BMI:   Estimated body mass index is 29.86 kg/m² as calculated from the following:    Height as of this encounter: 1.676 m (5' 6\").    Weight as of this encounter: 83.9 kg (185 " lb).    Objective   Physical Exam  Constitutional:       General: He is not in acute distress.  HENT:      Head: Normocephalic.      Nose: Nose normal.      Mouth/Throat:      Mouth: Mucous membranes are moist.   Eyes:      Conjunctiva/sclera: Conjunctivae normal.   Pulmonary:      Effort: Pulmonary effort is normal.   Musculoskeletal:         General: Normal range of motion.      Cervical back: Neck supple.   Neurological:      General: No focal deficit present.      Mental Status: He is alert.   Psychiatric:         Mood and Affect: Mood normal.

## 2025-01-10 PROCEDURE — RXMED WILLOW AMBULATORY MEDICATION CHARGE

## 2025-01-13 ENCOUNTER — PHARMACY VISIT (OUTPATIENT)
Dept: PHARMACY | Facility: CLINIC | Age: 60
End: 2025-01-13
Payer: COMMERCIAL

## 2025-01-17 ENCOUNTER — TELEMEDICINE (OUTPATIENT)
Dept: PHARMACY | Facility: HOSPITAL | Age: 60
End: 2025-01-17
Payer: COMMERCIAL

## 2025-01-17 DIAGNOSIS — I10 PRIMARY HYPERTENSION: ICD-10-CM

## 2025-01-17 DIAGNOSIS — E11.65 TYPE 2 DIABETES MELLITUS WITH HYPERGLYCEMIA, WITHOUT LONG-TERM CURRENT USE OF INSULIN: ICD-10-CM

## 2025-01-17 NOTE — ASSESSMENT & PLAN NOTE
ASSESSMENT OF SMBP MEASUREMENTS  Not checked this week at home    Patient's goal BP < 130/80.   Rationale for plan: The patient confirms he started new blood pressure medications this Monday 1/13. He has not noted any side effects. He admits he has not been checking blood pressure at home, but will start taking daily in the morning. He also plans to get ordered labs tomorrow morning. Given we do not have blood pressure readings today, no adjustments to medications. Will follow-up 1/30 after labs result and patient has home readings. We discussed that if his labs are abnormal, he may need to start a different medication for his treatment. As far as lifestyle, the patient does avoid salty foods, and has cut back on caffeine.  CrCl cannot be calculated (Patient's most recent lab result is older than the maximum 3 days allowed.).    Medication Changes:  CONTINUE  Fernando 5-40 mg daily  Chlorthalidone 25 mg daily    Future Considerations:  Spironolactone if renin/aldosterone labs are abnormal  Can maximize amlodipine dose, but if unable to achieve desired response, spironolactone is typically 4th line medication    Monitoring and Education Discussed:  Eat right: Your diet should be rich in fruits, vegetables, potassium, and low-fat dairy products. You should also reduce your intake of sodium and fats, particularly saturated fats.  Maintain a healthy weight: Try to achieve and maintain a healthy weight. If you are unsure what this means for you, please contact our clinic.  Exercise: Try to get at least 30 minutes of aerobic exercise every day.  Moderate your alcohol consumption: Limit your alcohol intake to one drink per day.  Monitor your blood pressure: You should check your blood pressure regularly. Notify our clinic if your blood pressure readings are consistently higher than recommended.

## 2025-01-17 NOTE — PROGRESS NOTES
Clinical Pharmacy Appointment    Patient ID: Kuldeep Kemp is a 59 y.o. male who presents for Diabetes and Hypertension.    Pt is here for Follow Up appointment.     Referring Provider: Ronda Wilcox APRN-*  PCP: Di Denise MD   Last visit with PCP: 1/9/2025   Next visit with PCP: 6/20/2025      Subjective     Interval History  New work, no longer working with , has 12-hr shifts  Ordered dexamethasone suppression and adrenal tests- he plans to take dexamethasone tonight and get labs tomorrow morning    HPI  DIABETES MELLITUS TYPE 2:    Diagnosed with diabetes:  . Known diabetic complications: fatty liver.  Does patient follow with Endocrinology: No  Podiatry: patient denies sores or cuts on feet today      Current diabetic medications include:  Mounjaro 2.5 mg weekly    Clarifications to above regimen: has been on for a while, no cost concerns  Adverse Effects: no concerns    Past diabetic medications include:  Metformin     Glucose Readings:  Glucometer/CGM Type: CGM, not frequent use    Current home BG readings (mg/dL): almost 100% in range     Any episodes of hypoglycemia? Yes, occasionally wakes to alarm for low- no symptoms and seems to correct itself, likely due to pressure on sensor .  Did patient treat episode of hypoglycemia appropriately? N/A  Does the patient have a prescription for ready-to-use Glucagon? Not on insulin  Does pt have proteinuria? Not at this time    Lifestyle:  Diet:  Does avoid processed foods and canned food, not much bread  BK: breakfast sandwich today- first time in a month; connolly once per week  Snacks: very rarely chips or pretzels, tries to avoid  Drinks: water, coffee    Secondary Prevention:  Statin? No  ACE-I/ARB? Yes  Aspirin? Yes    Pertinent PMH Review:  PMH of Pancreatitis: No  PMH of Retinopathy: No  PMH of Urinary Tract Infections: No  PMH of MTC: No    Immunizations:  Influenza? No  COVID? Yes  Pneumonia? Yes  Shingles? No  Hepatitis B? No      HYPERTENSION ASSESSMENT  Medication Therapy  Current Medications:   Amlodipine 5/olmesartan 40 mg daily  Chlorthalidone 25 mg daily  Previous Medications: amlodipine 10 mg, hydrochlorothiazide 25 mg, lisinopril (cough)      Clarifications to above regimen: just switched over Monday   Adverse Effects: no concerns     Home BP Monitoring  BP Cuff Type: Digital arm monitor  Cuff Validated? Yes, home cuff was running about 8 mmHg high diastolic, ~20 higher systolic- may need replaced    Current home BP readings: not checking recently, states he will plan to start checking mornings before work     BP Readings from Last 3 Encounters:   01/09/25 173/83   12/23/24 (!) 168/92   12/16/24 148/90       Lifestyle  See above    Sodium Intake:  caffeine intake: 2 cups of coffee in the morning; cut back from 3 pots of coffee ; very little added salt per the patient- avoids processed foods    Risk Assessment  Major Risk Factors Include:  Hypertension  Tobacco use  Diabetes mellitus  Age > 55 (men) or > 65 (women)  The 10-year ASCVD risk score (Eleuterio KO, et al., 2019) is: 36.1%    Values used to calculate the score:      Age: 59 years      Sex: Male      Is Non- : No      Diabetic: Yes      Tobacco smoker: No      Systolic Blood Pressure: 173 mmHg      Is BP treated: Yes      HDL Cholesterol: 33.3 mg/dL      Total Cholesterol: 188 mg/dL  Known target organ damage:  None    Secondary Prevention  On Statin?: No, previous rosuvastatin  Immunizations Needed: Annual Flu and Shingrix  Tobacco Use: former smoker, quit recently      Medication Reconciliation:  Changed: no reported changes    Drug Interactions  The following drug interactions were noted:    Olmesartan use on potassium supplement- monitor potassium levels    Medication System Management  Patient's preferred pharmacy:  [facility]  Adherence/Organization: reports taking medications as directed, has not been checking home  BP  Affordability/Accessibility: no concerns      Objective   Allergies   Allergen Reactions    Animal Dander Unknown    Cat Dander Unknown    Lisinopril Cough     Social History     Social History Narrative    Not on file      Medication Review  Current Outpatient Medications   Medication Instructions    amlodipine-olmesartan (Fernando) 5-40 mg tablet 1 tablet, oral, Daily    ascorbic acid (VITAMIN C) 500 mg, Daily    aspirin 81 mg, Daily    chlorthalidone (HYGROTON) 25 mg, oral, Daily    cholecalciferol (VITAMIN D-3) 25 mcg, Daily    dexAMETHasone (DECADRON) 1 mg, oral, Once, Take at 11pm the night before the cortisol lab test    flash glucose sensor kit kit APPLY 1 SENSOR TO THE BACK OF THE ARM EVERY 14 DAYS TO MONITOR BLOOD SUGAR    glucosamine-chondroitin 500-400 mg tablet 1 tablet, 3 times daily    milk thistle 175 mg, Daily    Mounjaro 2.5 mg, subcutaneous, Once Weekly    potassium chloride CR (Klor-Con) 10 mEq ER tablet 10 mEq, oral, Daily, Do not crush, chew, or split.    triamcinolone (Kenalog) 0.1 % cream Apply to affected area 1-2 times daily as needed. Avoid face and groin.    TURMERIC ORAL 1 tablet, Daily      Vitals  BP Readings from Last 2 Encounters:   01/09/25 173/83   12/23/24 (!) 168/92     BMI Readings from Last 1 Encounters:   01/09/25 29.86 kg/m²      Labs  A1C  Lab Results   Component Value Date    HGBA1C 5.7 (H) 07/29/2024    HGBA1C 5.6 11/27/2023    HGBA1C 6.4 (A) 05/03/2023     BMP  Lab Results   Component Value Date    CALCIUM 9.2 07/29/2024     07/29/2024    K 3.4 (L) 07/29/2024    CO2 28 07/29/2024     07/29/2024    BUN 15 07/29/2024    CREATININE 0.73 07/29/2024    EGFR >90 07/29/2024     LFTs  Lab Results   Component Value Date    ALT 19 07/29/2024    AST 21 07/29/2024    ALKPHOS 50 07/29/2024    BILITOT 0.7 07/29/2024     FLP  Lab Results   Component Value Date    TRIG 271 (H) 07/29/2024    CHOL 188 07/29/2024    LDLF 165 (H) 02/15/2023    LDLCALC 101 (H) 07/29/2024    HDL  "33.3 07/29/2024     Urine Microalbumin  No results found for: \"MICROALBCREA\"  Weight Management  Wt Readings from Last 3 Encounters:   01/09/25 83.9 kg (185 lb)   12/23/24 84.2 kg (185 lb 9.6 oz)   12/16/24 81.6 kg (180 lb)      There is no height or weight on file to calculate BMI.     Assessment/Plan   Problem List Items Addressed This Visit       Hypertension     ASSESSMENT OF SMBP MEASUREMENTS  Not checked this week at home    Patient's goal BP < 130/80.   Rationale for plan: The patient confirms he started new blood pressure medications this Monday 1/13. He has not noted any side effects. He admits he has not been checking blood pressure at home, but will start taking daily in the morning. He also plans to get ordered labs tomorrow morning. Given we do not have blood pressure readings today, no adjustments to medications. Will follow-up 1/30 after labs result and patient has home readings. We discussed that if his labs are abnormal, he may need to start a different medication for his treatment. As far as lifestyle, the patient does avoid salty foods, and has cut back on caffeine.  CrCl cannot be calculated (Patient's most recent lab result is older than the maximum 3 days allowed.).    Medication Changes:  CONTINUE  Fernando 5-40 mg daily  Chlorthalidone 25 mg daily    Future Considerations:  Spironolactone if renin/aldosterone labs are abnormal  Can maximize amlodipine dose, but if unable to achieve desired response, spironolactone is typically 4th line medication    Monitoring and Education Discussed:  Eat right: Your diet should be rich in fruits, vegetables, potassium, and low-fat dairy products. You should also reduce your intake of sodium and fats, particularly saturated fats.  Maintain a healthy weight: Try to achieve and maintain a healthy weight. If you are unsure what this means for you, please contact our clinic.  Exercise: Try to get at least 30 minutes of aerobic exercise every day.  Moderate your " alcohol consumption: Limit your alcohol intake to one drink per day.  Monitor your blood pressure: You should check your blood pressure regularly. Notify our clinic if your blood pressure readings are consistently higher than recommended.          Relevant Orders    Referral to Clinical Pharmacy    Type 2 diabetes mellitus with hyperglycemia (Multi)    Relevant Orders    Referral to Clinical Pharmacy       Clinical Pharmacist follow-up: 1/30 9:40AM, Telehealth visit    Continue all meds under the continuation of care with the referring provider and clinical pharmacy team.    Thank you,  Olimpia Bajwa, PharmD  Clinical Pharmacist  [phone]    Verbal consent to manage patient's drug therapy was obtained from the patient. They were informed they may decline to participate or withdraw from participation in pharmacy services at any time.

## 2025-01-21 ENCOUNTER — APPOINTMENT (OUTPATIENT)
Dept: PHARMACY | Facility: HOSPITAL | Age: 60
End: 2025-01-21
Payer: COMMERCIAL

## 2025-01-23 ENCOUNTER — EVALUATION (OUTPATIENT)
Dept: PHYSICAL THERAPY | Facility: CLINIC | Age: 60
End: 2025-01-23
Payer: COMMERCIAL

## 2025-01-23 DIAGNOSIS — M67.911 TENDINOPATHY OF RIGHT ROTATOR CUFF: Primary | ICD-10-CM

## 2025-01-23 DIAGNOSIS — S46.011A ROTATOR CUFF STRAIN, RIGHT, INITIAL ENCOUNTER: ICD-10-CM

## 2025-01-23 DIAGNOSIS — S46.911A SHOULDER STRAIN, RIGHT, INITIAL ENCOUNTER: ICD-10-CM

## 2025-01-23 PROCEDURE — 97161 PT EVAL LOW COMPLEX 20 MIN: CPT | Mod: GP

## 2025-01-23 PROCEDURE — 97110 THERAPEUTIC EXERCISES: CPT | Mod: GP

## 2025-01-23 NOTE — PROGRESS NOTES
"Patient Name: Kuldeep Kemp  MRN: [id number]  Today's Date: 1/23/2025  Visit #1  Time Calculation  Start Time: 1740  Stop Time: 1825  Time Calculation (min): 45 min    Subjective:  Chief Complaint: Kuldeep is a 59 y.o. M who presents to therapy c/o R shoulder pain. Symptoms began in December after injuring his shoulder while pushing a cart that stuck into the ground - forcing his shoulder to \"jam back.\" This unfortunately happened again the following day - resulting in two days of immobility. His symptoms are located along his anterior shoulder. Pain has progressively improved but he still notes moderate weakness. Denies any referring pain past his elbow (with the exception of long-standing radiculopathy).   Onset Date: 12/28/2024  Pain intensity at rest: 0/10  Pain intensity at worst: 5/10  Alleviating factors: Avoiding painful movements  Aggravating factors: \"anything overhead.\"  PMH: L  rev shoulder arthroplasty - 2013 (2 revisions d/t infection).  Occupation: Maintenance  Therapy Goals: Improve strength and decrease pain    Objective:  Shoulder PROM (L/R) - no pain reported passively  Flexion: 100°/160°  Abduction: 100°/160°  Extension: 30°/40°  External Rotation: 50°/80°  Internal rotation: 30°/30° - \"feels tight\"    Shoulder AROM: Similar to passive but painful    Shoulder MMT (R) - painful throughout  Upper trapezius: 5/5  Middle trapezius: 4-/5  Lower trapezius: 4-/5  Supraspinatus: 14.3  Infraspinatus:14.1  Teres Minor: 14.2  Subscapularis: 20.7  Biceps: 30.7    Joint mobility testing (normal unless otherwise noted below)  GHJ: Mild hypo AP and mod inferior  STJ: Mod hypo all directions    Scapular position: Mild winging      Shoulder Special Tests (L/R)    Painful Arc: Negative/Positive  Infraspinatus MMT: Negative/Positive  Lateral Dayami: Negative/Negative  Shrug Sign: Negative/Negative  Apprehension/Surprise Test: Negative/Negative  Scapular assist: Negative/Positive    Dermatomes intact BUE    QuickDASH: " 21 = 22.73%    Treatment:  PT Evaluation Time Entry  PT Evaluation (Low) Time Entry: 20  PT Therapeutic Procedures Time Entry  Therapeutic Exercise Time Entry: 25    Therapeutic Exercises: handout provided: Completed in full  *Neutral ER iso  *Neutral IR iso  *Neutral ABD w/ GTB    Education/Discussion: Assessment findings, RTC involvement, periscapular strength, exercise rationale and progressions, isometrics, answered all questions in full    Assessment:   Kuldeep presents to therapy c/o R anterior shoulder pain. Symptoms appear consistent w/ possible RTC involvement. He is likely to benefit from skilled interventions to address their impairments, and treatment that includes: manual techniques to decrease pain and improve joint/soft-tissue mobility, as well as exercises to increase strength, endurance, and neuromotor control.    Standardized testing and measures administered today reveal that the patient has multiple impairments in body structures and functions, activity limitations, and participation restrictions.  The patient has 0 personal factors and comorbidities that may serve as barriers affecting the plan of care. The patient's clinical presentation includes stable & uncomplicated characteristics as noted during today's evaluation, & these findings indicate that this patient is of low complexity.  Skilled PT services are warranted in order to realize measurable change in the above outcome measures and achieve improvements in the patient's functional status and individual goals.    Plan:   Planned interventions include: dry needling, education/instruction, manual therapy, neuromuscular re-education, self care/home management, therapeutic activities and therapeutic exercises.   Potential to achieve rehab goals: good    POC: 1x/week: 20 visits    Goals:   Demonstrate independence with home exercise program  Tolerate increased exercise without adverse reaction  Demonstrate improved posture & proper body  mechanics throughout session  Increase R shoulder ROM to pain free + WNL  Increase R shoulder strength to pain free + WNL  Report decrease in pain by > 2 points to meet the MCID  Decrease score of Quick DASH by > 8 points to meet the MCID  Perform ADLs without an increase symptoms  Pt. will be able to sleep through the night without an increase in symptoms  Achieve pt. specific goals including: Able to complete a full day of work - uninhibited by symptoms.

## 2025-01-30 ENCOUNTER — APPOINTMENT (OUTPATIENT)
Dept: PHARMACY | Facility: HOSPITAL | Age: 60
End: 2025-01-30
Payer: COMMERCIAL

## 2025-01-30 DIAGNOSIS — I10 PRIMARY HYPERTENSION: ICD-10-CM

## 2025-01-30 DIAGNOSIS — E11.65 TYPE 2 DIABETES MELLITUS WITH HYPERGLYCEMIA, WITHOUT LONG-TERM CURRENT USE OF INSULIN: ICD-10-CM

## 2025-01-30 NOTE — PROGRESS NOTES
Clinical Pharmacy Appointment    Patient ID: Kuldeep Kemp is a 59 y.o. male who presents for Hypertension and Diabetes.    Pt is here for Follow Up appointment.     Referring Provider: Ronda Wilcox APRN-*  PCP: Di Denise MD   Last visit with PCP: 1/9/2025   Next visit with PCP: 6/20/2025      Subjective     Interval History  Labs not yet completed- reminded to complete before next PharmD visit    HPI  DIABETES MELLITUS TYPE 2:    Diagnosed with diabetes:  . Known diabetic complications: fatty liver.  Does patient follow with Endocrinology: No  Podiatry: patient denies sores or cuts on feet today      Current diabetic medications include:  Mounjaro 2.5 mg weekly    Clarifications to above regimen: has been on for a while, no cost concerns  Adverse Effects: no concerns    Past diabetic medications include:  Metformin     Glucose Readings:  Glucometer/CGM Type: CGM, not frequent use    Current home BG readings (mg/dL): almost 100% in range - has not worn new sensor lately     Any episodes of hypoglycemia? Yes, occasionally wakes to alarm for low- no symptoms and seems to correct itself, likely due to pressure on sensor .  Did patient treat episode of hypoglycemia appropriately? N/A  Does the patient have a prescription for ready-to-use Glucagon? Not on insulin  Does pt have proteinuria? Not at this time    Lifestyle:  Exercise: trying to improve, in warmer weather likes riding bicycle    Hx:  Diet:  Does avoid processed foods and canned food, not much bread  BK: breakfast sandwich today- first time in a month; connolly once per week  Snacks: very rarely chips or pretzels, tries to avoid  Drinks: water, coffee    Secondary Prevention:  Statin? No  ACE-I/ARB? Yes  Aspirin? Yes    Pertinent PMH Review:  PMH of Pancreatitis: No  PMH of Retinopathy: No  PMH of Urinary Tract Infections: No  PMH of MTC: No    Immunizations:  Influenza? No  COVID? Yes  Pneumonia? Yes  Shingles? No  Hepatitis B? No      HYPERTENSION ASSESSMENT  Medication Therapy  Current Medications:   Amlodipine 5/olmesartan 40 mg daily  Chlorthalidone 25 mg daily  Previous Medications: amlodipine 10 mg, hydrochlorothiazide 25 mg, lisinopril (cough)      Clarifications to above regimen: no repeat labs since medication adjustment  Adverse Effects: no concerns     Home BP Monitoring  BP Cuff Type: Digital arm monitor  Cuff Validated? Yes, home cuff was running about 8 mmHg high diastolic, ~20 higher systolic- may need replaced    Current home BP readings: 135/85 mmHg- this is on a new reader, he will bring in to validate next visit    BP Readings from Last 3 Encounters:   01/09/25 173/83   12/23/24 (!) 168/92   12/16/24 148/90       Lifestyle  See above    Sodium Intake:  caffeine intake: 2 cups of coffee in the morning; cut back from 3 pots of coffee ; very little added salt per the patient- avoids processed foods    Risk Assessment  Major Risk Factors Include:  Hypertension  Tobacco use  Diabetes mellitus  Age > 55 (men) or > 65 (women)  The 10-year ASCVD risk score (Eleuterio KO, et al., 2019) is: 36.1%    Values used to calculate the score:      Age: 59 years      Sex: Male      Is Non- : No      Diabetic: Yes      Tobacco smoker: No      Systolic Blood Pressure: 173 mmHg      Is BP treated: Yes      HDL Cholesterol: 33.3 mg/dL      Total Cholesterol: 188 mg/dL  Known target organ damage:  None    Secondary Prevention  On Statin?: No, previous rosuvastatin  Immunizations Needed: Annual Flu and Shingrix  Tobacco Use: former smoker, quit recently      Medication Reconciliation:  Changed: no reported changes    Drug Interactions  The following drug interactions were noted:    Olmesartan use on potassium supplement- monitor potassium levels    Medication System Management  [facility]'s preferred [facility]:  [facility]  Adherence/Organization: reports taking medications as directed, has not been checking home  BP  Affordability/Accessibility: no concerns      Objective   Allergies   Allergen Reactions    Animal Dander Unknown    Cat Dander Unknown    Lisinopril Cough     Social History     Social History Narrative    Not on file      Medication Review  Current Outpatient Medications   Medication Instructions    amlodipine-olmesartan (Fernando) 5-40 mg tablet 1 tablet, oral, Daily    ascorbic acid (VITAMIN C) 500 mg, Daily    aspirin 81 mg, Daily    chlorthalidone (HYGROTON) 25 mg, oral, Daily    cholecalciferol (VITAMIN D-3) 25 mcg, Daily    dexAMETHasone (DECADRON) 1 mg, oral, Once, Take at 11pm the night before the cortisol lab test    flash glucose sensor kit kit APPLY 1 SENSOR TO THE BACK OF THE ARM EVERY 14 DAYS TO MONITOR BLOOD SUGAR    glucosamine-chondroitin 500-400 mg tablet 1 tablet, 3 times daily    milk thistle 175 mg, Daily    Mounjaro 2.5 mg, subcutaneous, Once Weekly    potassium chloride CR (Klor-Con) 10 mEq ER tablet 10 mEq, oral, Daily, Do not crush, chew, or split.    triamcinolone (Kenalog) 0.1 % cream Apply to affected area 1-2 times daily as needed. Avoid face and groin.    TURMERIC ORAL 1 tablet, Daily      Vitals  BP Readings from Last 2 Encounters:   01/09/25 173/83   12/23/24 (!) 168/92     BMI Readings from Last 1 Encounters:   01/09/25 29.86 kg/m²      Labs  A1C  Lab Results   Component Value Date    HGBA1C 5.7 (H) 07/29/2024    HGBA1C 5.6 11/27/2023    HGBA1C 6.4 (A) 05/03/2023     BMP  Lab Results   Component Value Date    CALCIUM 9.2 07/29/2024     07/29/2024    K 3.4 (L) 07/29/2024    CO2 28 07/29/2024     07/29/2024    BUN 15 07/29/2024    CREATININE 0.73 07/29/2024    EGFR >90 07/29/2024     LFTs  Lab Results   Component Value Date    ALT 19 07/29/2024    AST 21 07/29/2024    ALKPHOS 50 07/29/2024    BILITOT 0.7 07/29/2024     FLP  Lab Results   Component Value Date    TRIG 271 (H) 07/29/2024    CHOL 188 07/29/2024    LDLF 165 (H) 02/15/2023    LDLCALC 101 (H) 07/29/2024    HDL  "33.3 07/29/2024     Urine Microalbumin  No results found for: \"MICROALBCREA\"  Weight Management  Wt Readings from Last 3 Encounters:   01/09/25 83.9 kg (185 lb)   12/23/24 84.2 kg (185 lb 9.6 oz)   12/16/24 81.6 kg (180 lb)      There is no height or weight on file to calculate BMI.     Assessment/Plan   Problem List Items Addressed This Visit       Hypertension     ASSESSMENT OF SMBP MEASUREMENTS  Average: 135/85  mmHg    Patient's goal BP < 130/80.   Rationale for plan: The patient has seen improvement in recent blood pressure readings. He does state that he got a new monitor recently. He will plan to come into the office at next PharmD visit to validate this new monitor. If monitor is accurate, he is now very near goal blood pressure. Given he has made lifestyle and medication changes very recently, and given the new monitor is not validated, no changes to medications today. He was reminded to get labs drawn before next visit. If needing improved control and labs are normal, will likely maximize amlodipine dose before additional changes are made.  CrCl cannot be calculated (Patient's most recent lab result is older than the maximum 3 days allowed.).    Medication Changes:  CONTINUE  Fernando 5/40 mg daily in the evening  Chlorthalidone 25 mg daily in the morning    Future Considerations:  Will maximize amlodipine to 10 mg if needed for further control  Consider spironolactone as 4th agent if needed- patient to get renin-aldosterone level done    Monitoring and Education Discussed:  Eat right: Your diet should be rich in fruits, vegetables, potassium, and low-fat dairy products. You should also reduce your intake of sodium and fats, particularly saturated fats.  Maintain a healthy weight: Try to achieve and maintain a healthy weight. If you are unsure what this means for you, please contact our clinic.  Exercise: Try to get at least 30 minutes of aerobic exercise every day.  Moderate your alcohol consumption: Limit " your alcohol intake to one drink per day.  Monitor your blood pressure: You should check your blood pressure regularly. Notify our clinic if your blood pressure readings are consistently higher than recommended.          Relevant Orders    Referral to Clinical Pharmacy    Type 2 diabetes mellitus with hyperglycemia (Multi)     Patient's goal A1c is < 7%.  Is pt at goal? Yes, A1c 5.7%  Patient's SMBGs are controlled last time he wore sensor.     Rationale for plan: The patient tolerates current Mounjaro therapy well. He has controlled A1c without symptoms of low glucose. Given this has been working well, no changes at this time. Did remind the patient to wear sensors more regularly for monitoring.    Medication Changes:  CONTINUE  Mounjaro 2.5 mg weekly    Monitoring and Education:   Continue monitoring using Jacqueline CGM         Relevant Orders    Referral to Clinical Pharmacy         Clinical Pharmacist follow-up: 2/13 4:20PM, Telehealth visit    Continue all meds under the continuation of care with the referring provider and clinical pharmacy team.    Thank you,  Olimpia Bajwa, PharmD  Clinical Pharmacist  [phone]    Verbal consent to manage patient's drug therapy was obtained from the patient. They were informed they may decline to participate or withdraw from participation in pharmacy services at any time.

## 2025-01-30 NOTE — ASSESSMENT & PLAN NOTE
ASSESSMENT OF SMBP MEASUREMENTS  Average: 135/85  mmHg    Patient's goal BP < 130/80.   Rationale for plan: The patient has seen improvement in recent blood pressure readings. He does state that he got a new monitor recently. He will plan to come into the office at next PharmD visit to validate this new monitor. If monitor is accurate, he is now very near goal blood pressure. Given he has made lifestyle and medication changes very recently, and given the new monitor is not validated, no changes to medications today. He was reminded to get labs drawn before next visit. If needing improved control and labs are normal, will likely maximize amlodipine dose before additional changes are made.  CrCl cannot be calculated (Patient's most recent lab result is older than the maximum 3 days allowed.).    Medication Changes:  CONTINUE  Fernando 5/40 mg daily in the evening  Chlorthalidone 25 mg daily in the morning    Future Considerations:  Will maximize amlodipine to 10 mg if needed for further control  Consider spironolactone as 4th agent if needed- patient to get renin-aldosterone level done    Monitoring and Education Discussed:  Eat right: Your diet should be rich in fruits, vegetables, potassium, and low-fat dairy products. You should also reduce your intake of sodium and fats, particularly saturated fats.  Maintain a healthy weight: Try to achieve and maintain a healthy weight. If you are unsure what this means for you, please contact our clinic.  Exercise: Try to get at least 30 minutes of aerobic exercise every day.  Moderate your alcohol consumption: Limit your alcohol intake to one drink per day.  Monitor your blood pressure: You should check your blood pressure regularly. Notify our clinic if your blood pressure readings are consistently higher than recommended.

## 2025-01-30 NOTE — ASSESSMENT & PLAN NOTE
Patient's goal A1c is < 7%.  Is pt at goal? Yes, A1c 5.7%  Patient's SMBGs are controlled last time he wore sensor.     Rationale for plan: The patient tolerates current Mounjaro therapy well. He has controlled A1c without symptoms of low glucose. Given this has been working well, no changes at this time. Did remind the patient to wear sensors more regularly for monitoring.    Medication Changes:  CONTINUE  Mounjaro 2.5 mg weekly    Monitoring and Education:   Continue monitoring using Jacqueline CGM

## 2025-02-05 ENCOUNTER — APPOINTMENT (OUTPATIENT)
Dept: PHYSICAL THERAPY | Facility: CLINIC | Age: 60
End: 2025-02-05
Payer: COMMERCIAL

## 2025-02-06 PROCEDURE — RXMED WILLOW AMBULATORY MEDICATION CHARGE

## 2025-02-12 ENCOUNTER — PHARMACY VISIT (OUTPATIENT)
Dept: PHARMACY | Facility: CLINIC | Age: 60
End: 2025-02-12
Payer: COMMERCIAL

## 2025-02-12 DIAGNOSIS — E11.9 TYPE 2 DIABETES MELLITUS WITHOUT COMPLICATION, WITHOUT LONG-TERM CURRENT USE OF INSULIN (MULTI): ICD-10-CM

## 2025-02-12 RX ORDER — FLASH GLUCOSE SENSOR
KIT MISCELLANEOUS
Qty: 2 EACH | Refills: 11 | Status: SHIPPED | OUTPATIENT
Start: 2025-02-12 | End: 2026-02-12

## 2025-02-13 ENCOUNTER — APPOINTMENT (OUTPATIENT)
Dept: PRIMARY CARE | Facility: CLINIC | Age: 60
End: 2025-02-13
Payer: COMMERCIAL

## 2025-02-13 ENCOUNTER — CLINICAL SUPPORT (OUTPATIENT)
Dept: SLEEP MEDICINE | Facility: HOSPITAL | Age: 60
End: 2025-02-13
Payer: COMMERCIAL

## 2025-02-13 DIAGNOSIS — R06.83 SNORING: ICD-10-CM

## 2025-02-13 PROCEDURE — RXMED WILLOW AMBULATORY MEDICATION CHARGE

## 2025-02-13 NOTE — PROGRESS NOTES
Type of Study: HOME SLEEP STUDY - NOMAD     The patient received equipment and instructions for use of the [employer] Nomad HSAT device. The patient was instructed how to apply the effort belts, cannula, thermistor. It was also explained how the Nomad and oximeter components work.  The patient was asked to record their sleep for an 8-hour period.     The patient was informed of their responsibility for the device and acknowledged this by signing the HSAT device contract. The patient was asked to return the device on tomorrow between the hours of 9a to the [facility].     The patient was instructed to call 911 as usual for any medical- emergencies while at home.  The patient was also given a phone number for troubleshooting when using the device in case there were additional questions.

## 2025-02-18 ENCOUNTER — APPOINTMENT (OUTPATIENT)
Dept: PRIMARY CARE | Facility: CLINIC | Age: 60
End: 2025-02-18
Payer: COMMERCIAL

## 2025-02-18 VITALS — WEIGHT: 181 LBS | BODY MASS INDEX: 29.21 KG/M2

## 2025-02-18 DIAGNOSIS — I10 PRIMARY HYPERTENSION: ICD-10-CM

## 2025-02-18 DIAGNOSIS — E11.65 TYPE 2 DIABETES MELLITUS WITH HYPERGLYCEMIA, WITHOUT LONG-TERM CURRENT USE OF INSULIN: ICD-10-CM

## 2025-02-18 PROCEDURE — RXMED WILLOW AMBULATORY MEDICATION CHARGE

## 2025-02-18 RX ORDER — TIRZEPATIDE 5 MG/.5ML
5 INJECTION, SOLUTION SUBCUTANEOUS WEEKLY
Qty: 2 ML | Refills: 11 | Status: SHIPPED | OUTPATIENT
Start: 2025-02-18

## 2025-02-18 NOTE — PROGRESS NOTES
Clinical Pharmacy Appointment    Patient ID: Kuldeep Kemp is a 59 y.o. male who presents for Hypertension and Diabetes.    Pt is here for Follow Up appointment.     Referring Provider: Ronda Wilcox APRN-*  PCP: Di Denise MD   Last visit with PCP: 1/9/2025   Next visit with PCP: 6/20/2025      Subjective     Interval History  Labs still not yet completed- reminded to complete     HPI  DIABETES MELLITUS TYPE 2:    Diagnosed with diabetes:  . Known diabetic complications: fatty liver.  Does patient follow with Endocrinology: No  Podiatry: patient denies sores or cuts on feet today      Current diabetic medications include:  Mounjaro 2.5 mg weekly    Clarifications to above regimen: has been on for a while, no cost concerns; believes gained weight when backed off on dosing  Adverse Effects: no concerns    Past diabetic medications include:  Metformin     Glucose Readings:  Glucometer/CGM Type: CGM, not frequent use    Current home BG readings (mg/dL): almost 100% in range - has not worn new sensor lately - maintains around 120 mg/dL    Any episodes of hypoglycemia? Yes, occasionally wakes to alarm for low- no symptoms and seems to correct itself, likely due to pressure on sensor .  Did patient treat episode of hypoglycemia appropriately? N/A  Does the patient have a prescription for ready-to-use Glucagon? Not on insulin  Does pt have proteinuria? Not at this time    Lifestyle:  Jacqueline often falls off early when he does wear them  Eating out a lot recently due to being busy, frequent business trip    1/17:  Exercise: trying to improve, in warmer weather likes riding bicycle    Hx:  Diet:  Does avoid processed foods and canned food, not much bread  BK: breakfast sandwich today- first time in a month; connolly once per week  Snacks: very rarely chips or pretzels, tries to avoid  Drinks: water, coffee    Secondary Prevention:  Statin? No  ACE-I/ARB? Yes  Aspirin? Yes    Pertinent PMH Review:  PMH of  Pancreatitis: No  PMH of Retinopathy: No  PMH of Urinary Tract Infections: No  PMH of MTC/MEN2: No    Immunizations:  Influenza? No  COVID? Yes  Pneumonia? Yes  Shingles? No  Hepatitis B? No     HYPERTENSION ASSESSMENT  Medication Therapy  Current Medications:   Amlodipine 5/olmesartan 40 mg daily  Chlorthalidone 25 mg daily  Previous Medications: amlodipine 10 mg, hydrochlorothiazide 25 mg, lisinopril (cough)      Clarifications to above regimen: no repeat labs since medication adjustment  Adverse Effects: no concerns     Home BP Monitoring  BP Cuff Type: Wrist monitor  Cuff Validated? Yes, validated new monitor today    Current home BP readings: 132/81 today   Previous home BP readings: 135/85 mmHg- this is on a new reader, he will bring in to validate next visit    Device Accuracy Test    STEP 1:              A Patient's Monitor left arm 134/82 mmHg HR - not measured   B Patient's Monitor left arm 135/83 mmHg HR    C Office's Monitor left arm 158/84 mmHg HR    D Patient's Monitor left arm 140/84 mmHg HR    E Office's Monitor right arm 162/90 mmHg HR      STEP 2:  Average of measurements B and D: 138/84  Compare average of B and D to measurement C:  The difference is greater than 10 mm Hg, thus the device should be replaced before proceeding with your SMBP program.    Given difference in office and wrist cuff, did also manually listen to BP- office is more accurate. Manual /76 mmHg.last 5.7%    CONCLUSION: This BP monitor is not acceptable for home BP monitoring.     BP Readings from Last 3 Encounters:   01/09/25 173/83   12/23/24 (!) 168/92   12/16/24 148/90       Lifestyle  See above    Sodium Intake:  caffeine intake: 2 cups of coffee in the morning; cut back from 3 pots of coffee ; very little added salt per the patient- avoids processed foods    Risk Assessment  Major Risk Factors Include:  Hypertension  Tobacco use  Diabetes mellitus  Age > 55 (men) or > 65 (women)  The 10-year ASCVD risk score (Eleuterio  MAIKEL, et al., 2019) is: 36.1%    Values used to calculate the score:      Age: 59 years      Sex: Male      Is Non- : No      Diabetic: Yes      Tobacco smoker: No      Systolic Blood Pressure: 173 mmHg      Is BP treated: Yes      HDL Cholesterol: 33.3 mg/dL      Total Cholesterol: 188 mg/dL  Known target organ damage:  None    Secondary Prevention  On Statin?: No, previous rosuvastatin  Immunizations Needed: Annual Flu and Shingrix  Tobacco Use: former smoker, quit recently      Medication Reconciliation:  Changed: no reported changes    Drug Interactions  The following drug interactions were noted:    Olmesartan use on potassium supplement- monitor potassium levels    Medication System Management  Patient's preferred pharmacy: [facility]  Adherence/Organization: reports taking medications as directed, has not been checking home BP  Affordability/Accessibility: no concerns      Objective   Allergies   Allergen Reactions    Animal Dander Unknown    Cat Dander Unknown    Lisinopril Cough     Social History     Social History Narrative    Not on file      Medication Review  Current Outpatient Medications   Medication Instructions    amlodipine-olmesartan (Fernando) 5-40 mg tablet 1 tablet, oral, Daily    ascorbic acid (VITAMIN C) 500 mg, Daily    aspirin 81 mg, Daily    chlorthalidone (HYGROTON) 25 mg, oral, Daily    cholecalciferol (VITAMIN D-3) 25 mcg, Daily    dexAMETHasone (DECADRON) 1 mg, oral, Once, Take at 11pm the night before the cortisol lab test    flash glucose sensor kit (FreeStyle Jacqueline 2 Sensor) kit APPLY 1 SENSOR TO THE BACK OF THE ARM EVERY 14 DAYS TO MONITOR BLOOD SUGAR    glucosamine-chondroitin 500-400 mg tablet 1 tablet, 3 times daily    milk thistle 175 mg, Daily    Mounjaro 5 mg, subcutaneous, Weekly    potassium chloride CR (Klor-Con) 10 mEq ER tablet 10 mEq, oral, Daily, Do not crush, chew, or split.    triamcinolone (Kenalog) 0.1 % cream Apply to affected area 1-2 times  "daily as needed. Avoid face and groin.    TURMERIC ORAL 1 tablet, Daily      Vitals  BP Readings from Last 2 Encounters:   01/09/25 173/83   12/23/24 (!) 168/92     BMI Readings from Last 1 Encounters:   02/18/25 29.21 kg/m²      Labs  A1C  Lab Results   Component Value Date    HGBA1C 5.7 (H) 07/29/2024    HGBA1C 5.6 11/27/2023    HGBA1C 6.4 (A) 05/03/2023     BMP  Lab Results   Component Value Date    CALCIUM 9.2 07/29/2024     07/29/2024    K 3.4 (L) 07/29/2024    CO2 28 07/29/2024     07/29/2024    BUN 15 07/29/2024    CREATININE 0.73 07/29/2024    EGFR >90 07/29/2024     LFTs  Lab Results   Component Value Date    ALT 19 07/29/2024    AST 21 07/29/2024    ALKPHOS 50 07/29/2024    BILITOT 0.7 07/29/2024     FLP  Lab Results   Component Value Date    TRIG 271 (H) 07/29/2024    CHOL 188 07/29/2024    LDLF 165 (H) 02/15/2023    LDLCALC 101 (H) 07/29/2024    HDL 33.3 07/29/2024     Urine Microalbumin  No results found for: \"MICROALBCREA\"  Weight Management  Wt Readings from Last 3 Encounters:   02/18/25 82.1 kg (181 lb)   01/09/25 83.9 kg (185 lb)   12/23/24 84.2 kg (185 lb 9.6 oz)      Body mass index is 29.21 kg/m².     Assessment/Plan   Problem List Items Addressed This Visit       Hypertension     ASSESSMENT OF SMBP MEASUREMENTS  Average: ~130/80  mmHg  Home wrist cuff does not seem as accurate as ideally would like. Read about 20 mmHg lower systolic than office monitor. Diastolic reading was similar.    Patient's goal BP < 130/80.   Rationale for plan: The patient feels well at this time and confirms current medications have not changed. He brought wrist monitor to office today for validation. It does not pass compared to office monitor. Difficult to know if home readings always elevated versus just not sensitive enough to detect higher readings. The patient did have a cup of coffee about 2 hours before our visit today and does also admit to stress after reading results of his sleep apnea test. This " could result in false high readings in office. He also wishes to increase Mounjaro, which can be helpful to lower systolic blood pressure. Since increasing Mounjaro, will not adjust other BP meds. Low threshold to increase amlodipine at future visits.  CrCl cannot be calculated (Patient's most recent lab result is older than the maximum 3 days allowed.).    Medication Changes:  CONTINUE  Fernando 5-40 mg daily  Chlorthalidone 25 mg daily    Future Considerations:  Would recommend maximizing amlodipine if home readings do not improve on Mounjaro 5 mg  Due for lab monitoring    Monitoring and Education Discussed:  Eat right: Your diet should be rich in fruits, vegetables, potassium, and low-fat dairy products. You should also reduce your intake of sodium and fats, particularly saturated fats.  Maintain a healthy weight: Try to achieve and maintain a healthy weight. If you are unsure what this means for you, please contact our clinic.  Exercise: Try to get at least 30 minutes of aerobic exercise every day.  Moderate your alcohol consumption: Limit your alcohol intake to one drink per day.  Monitor your blood pressure: You should check your blood pressure regularly. Notify our clinic if your blood pressure readings are consistently higher than recommended.          Relevant Orders    Referral to Clinical Pharmacy    Type 2 diabetes mellitus with hyperglycemia (Multi)     Patient's goal A1c is < 7%.  Is pt at goal? Yes, last 5.7%  Patient's SMBGs are controlled last time patient wore Jacqueline.     Rationale for plan: The patient tolerates Mounjaro well and without cost concerns. He notes gaining weight after previous lowering of Mounjaro dose. Given weight concerns, no true lows that he can recall, and recent positive sleep apnea, agreeable to re-increase Mounjaro to 5 mg weekly. He may also see a benefit to blood pressure by increasing Mounjaro.    Medication Changes:  INCREASE  Mounjaro to 5 mg weekly    Monitoring and  Education:   Monitor with Jacqueline OGODWIN         Relevant Medications    tirzepatide (Mounjaro) 5 mg/0.5 mL pen injector    Other Relevant Orders    Referral to Clinical Pharmacy           Clinical Pharmacist follow-up: 3/11 8:40AM, Telehealth visit  The patient is not a chronic care management patient.    Continue all meds under the continuation of care with the referring provider and clinical pharmacy team.    Thank you,  Olimpia Bajwa, PharmD  Clinical Pharmacist  [phone]    Verbal consent to manage patient's drug therapy was obtained from the patient. They were informed they may decline to participate or withdraw from participation in pharmacy services at any time.

## 2025-02-18 NOTE — ASSESSMENT & PLAN NOTE
ASSESSMENT OF SMBP MEASUREMENTS  Average: ~130/80  mmHg  Home wrist cuff does not seem as accurate as ideally would like. Read about 20 mmHg lower systolic than office monitor. Diastolic reading was similar.    Patient's goal BP < 130/80.   Rationale for plan: The patient feels well at this time and confirms current medications have not changed. He brought wrist monitor to office today for validation. It does not pass compared to office monitor. Difficult to know if home readings always elevated versus just not sensitive enough to detect higher readings. The patient did have a cup of coffee about 2 hours before our visit today and does also admit to stress after reading results of his sleep apnea test. This could result in false high readings in office. He also wishes to increase Mounjaro, which can be helpful to lower systolic blood pressure. Since increasing Mounjaro, will not adjust other BP meds. Low threshold to increase amlodipine at future visits.  CrCl cannot be calculated (Patient's most recent lab result is older than the maximum 3 days allowed.).    Medication Changes:  CONTINUE  Fernando 5-40 mg daily  Chlorthalidone 25 mg daily    Future Considerations:  Would recommend maximizing amlodipine if home readings do not improve on Mounjaro 5 mg  Due for lab monitoring    Monitoring and Education Discussed:  Eat right: Your diet should be rich in fruits, vegetables, potassium, and low-fat dairy products. You should also reduce your intake of sodium and fats, particularly saturated fats.  Maintain a healthy weight: Try to achieve and maintain a healthy weight. If you are unsure what this means for you, please contact our clinic.  Exercise: Try to get at least 30 minutes of aerobic exercise every day.  Moderate your alcohol consumption: Limit your alcohol intake to one drink per day.  Monitor your blood pressure: You should check your blood pressure regularly. Notify our clinic if your blood pressure readings are  consistently higher than recommended.

## 2025-02-18 NOTE — ASSESSMENT & PLAN NOTE
Patient's goal A1c is < 7%.  Is pt at goal? Yes, last 5.7%  Patient's SMBGs are controlled last time patient wore Jacqueline.     Rationale for plan: The patient tolerates Mounjaro well and without cost concerns. He notes gaining weight after previous lowering of Mounjaro dose. Given weight concerns, no true lows that he can recall, and recent positive sleep apnea, agreeable to re-increase Mounjaro to 5 mg weekly. He may also see a benefit to blood pressure by increasing Mounjaro.    Medication Changes:  INCREASE  Mounjaro to 5 mg weekly    Monitoring and Education:   Monitor with Jacqueline CGM

## 2025-02-20 ENCOUNTER — APPOINTMENT (OUTPATIENT)
Dept: ORTHOPEDIC SURGERY | Facility: CLINIC | Age: 60
End: 2025-02-20
Payer: COMMERCIAL

## 2025-02-20 ENCOUNTER — OFFICE VISIT (OUTPATIENT)
Dept: PRIMARY CARE | Facility: CLINIC | Age: 60
End: 2025-02-20
Payer: COMMERCIAL

## 2025-02-20 VITALS
TEMPERATURE: 97.5 F | BODY MASS INDEX: 28.93 KG/M2 | DIASTOLIC BLOOD PRESSURE: 79 MMHG | SYSTOLIC BLOOD PRESSURE: 146 MMHG | HEIGHT: 66 IN | HEART RATE: 84 BPM | WEIGHT: 180 LBS | OXYGEN SATURATION: 95 %

## 2025-02-20 DIAGNOSIS — J02.9 PHARYNGITIS, UNSPECIFIED ETIOLOGY: Primary | ICD-10-CM

## 2025-02-20 DIAGNOSIS — R06.83 SNORING: ICD-10-CM

## 2025-02-20 PROCEDURE — 99213 OFFICE O/P EST LOW 20 MIN: CPT | Performed by: NURSE PRACTITIONER

## 2025-02-20 PROCEDURE — 3077F SYST BP >= 140 MM HG: CPT | Performed by: NURSE PRACTITIONER

## 2025-02-20 PROCEDURE — 3008F BODY MASS INDEX DOCD: CPT | Performed by: NURSE PRACTITIONER

## 2025-02-20 PROCEDURE — 1036F TOBACCO NON-USER: CPT | Performed by: NURSE PRACTITIONER

## 2025-02-20 PROCEDURE — 3078F DIAST BP <80 MM HG: CPT | Performed by: NURSE PRACTITIONER

## 2025-02-20 RX ORDER — AMOXICILLIN 875 MG/1
875 TABLET, FILM COATED ORAL 2 TIMES DAILY
Qty: 20 TABLET | Refills: 0 | Status: SHIPPED | OUTPATIENT
Start: 2025-02-20 | End: 2025-03-02

## 2025-02-20 ASSESSMENT — PATIENT HEALTH QUESTIONNAIRE - PHQ9
2. FEELING DOWN, DEPRESSED OR HOPELESS: NOT AT ALL
1. LITTLE INTEREST OR PLEASURE IN DOING THINGS: NOT AT ALL
SUM OF ALL RESPONSES TO PHQ9 QUESTIONS 1 AND 2: 0

## 2025-02-20 NOTE — PROGRESS NOTES
"Problem List Items Addressed This Visit    None  Visit Diagnoses       Pharyngitis, unspecified etiology    -  Primary    rapid strep neg  given clinical presentation start amox now  strep cx sent    Relevant Medications    amoxicillin (Amoxil) 875 mg tablet    Other Relevant Orders    Group A Streptococcus, Culture             Subjective   Patient ID: Kuldeep Kemp is a 59 y.o. male who presents for Sick Visit (Sore throat -Sx's started few days ago ).  HPI  No fever  Occ cough  Breathing fine     Review of Systems   All other systems reviewed and are negative.      BP Readings from Last 3 Encounters:   02/20/25 146/79   01/09/25 173/83   12/23/24 (!) 168/92      Wt Readings from Last 3 Encounters:   02/20/25 81.6 kg (180 lb)   02/18/25 82.1 kg (181 lb)   01/09/25 83.9 kg (185 lb)      BMI:   Estimated body mass index is 29.05 kg/m² as calculated from the following:    Height as of this encounter: 1.676 m (5' 6\").    Weight as of this encounter: 81.6 kg (180 lb).    Objective   Physical Exam  Constitutional:       General: He is not in acute distress.  HENT:      Head: Normocephalic and atraumatic.      Right Ear: Tympanic membrane and external ear normal.      Left Ear: Tympanic membrane and external ear normal.      Nose: Nose normal.      Mouth/Throat:      Pharynx: Oropharyngeal exudate and posterior oropharyngeal erythema present.      Comments: Tonsils 2+  Eyes:      Extraocular Movements: Extraocular movements intact.      Conjunctiva/sclera: Conjunctivae normal.   Neck:      Vascular: No carotid bruit.   Cardiovascular:      Rate and Rhythm: Normal rate and regular rhythm.      Pulses: Normal pulses.   Pulmonary:      Effort: Pulmonary effort is normal.      Breath sounds: Normal breath sounds.   Musculoskeletal:         General: Normal range of motion.      Cervical back: Normal range of motion and neck supple.   Lymphadenopathy:      Cervical: No cervical adenopathy.   Skin:     General: Skin is warm " Progress Note  Admit Date: 9/21/2021       Patient seen and examined  Doing well today   He denies having really any complaints  No chest pain or  Shortness of breath  No nausea or vomiting  No light headedness or dizziness    Scheduled Medications:    [START ON 9/25/2021] warfarin  2.5 mg Oral Daily    pantoprazole  40 mg IntraVENous Daily    meropenem  1,000 mg IntraVENous Q12H    Venelex   Topical BID    warfarin (COUMADIN) daily dosing (placeholder)   Other See Admin Instructions    collagenase   Topical Daily    amiodarone  200 mg Oral BID    atorvastatin  80 mg Oral Nightly    ferrous sulfate  325 mg Oral BID    magnesium oxide  400 mg Oral Daily    tamsulosin  0.4 mg Oral Nightly    insulin glargine  30 Units SubCUTAneous Nightly    insulin lispro  0.08 Units/kg SubCUTAneous TID WC    insulin lispro  0-12 Units SubCUTAneous TID WC    insulin lispro  0-6 Units SubCUTAneous Nightly    sodium chloride flush  10 mL IntraVENous 2 times per day    sennosides-docusate sodium  1 tablet Oral BID      PRN Medications: sodium chloride, sodium chloride, traMADol, glucose, dextrose, glucagon (rDNA), dextrose, sodium chloride flush, sodium chloride, ondansetron **OR** ondansetron, magnesium hydroxide, acetaminophen **OR** acetaminophen  Diet: ADULT DIET;  Regular; Low Fat/Low Chol/High Fiber/2 gm Na  Diet NPO    Continuous Infusions:   sodium chloride      sodium chloride      dextrose      sodium chloride         PHYSICAL EXAM:  /74   Pulse 85   Temp 97.8 °F (36.6 °C) (Oral)   Resp 18   Ht 5' 8\" (1.727 m)   Wt 203 lb (92.1 kg)   SpO2 97%   BMI 30.87 kg/m²   Recent Labs     09/23/21  1113 09/23/21  1755 09/23/21  2056 09/24/21  0721 09/24/21  1128   POCGLU 122* 209* 206* 79 84       Intake/Output Summary (Last 24 hours) at 9/24/2021 1456  Last data filed at 9/24/2021 1243  Gross per 24 hour   Intake 2054.69 ml   Output 2300 ml   Net -245.31 ml       /74   Pulse 85   Temp 97.8 °F (36.6 °C) (Oral)   Resp 18   Ht 5' 8\" (1.727 m)   Wt 203 lb (92.1 kg)   SpO2 97%   BMI 30.87 kg/m²   General appearance: alert, appears stated age and cooperative  Head: Normocephalic, without obvious abnormality, atraumatic  Neck: no adenopathy, no carotid bruit, no JVD, supple, symmetrical, trachea midline and thyroid not enlarged, symmetric, no tenderness/mass/nodules  Lungs: clear to auscultation bilaterally  Heart: regular rate and rhythm, S1, S2 normal, no murmur, click, rub or gallop  Abdomen: soft, non-tender; bowel sounds normal; no masses,  no organomegaly  Extremities: dressings in place    LABS:  Recent Labs     09/22/21  0500 09/22/21  0500 09/23/21  0040 09/23/21  0437 09/24/21  0451   WBC 9.6  --   --  10.5 10.1   HGB 7.0*   < > 7.8* 7.8* 7.7*   HCT 21.8*   < > 23.8* 23.9* 23.9*     --   --  302 270    < > = values in this interval not displayed. Recent Labs     09/22/21  2125 09/23/21  1000 09/24/21  0451   * 137 137   K 4.4 4.6 4.6   CL 99 101 103   CO2 26 26 26   BUN 79* 71* 63*   CREATININE 2.9* 2.5* 2.3*   GLUCOSE 151* 70 81     Recent Labs     09/21/21  1716   AST 19   ALT 21   BILITOT <0.2   ALKPHOS 102     Recent Labs     09/23/21  2316 09/24/21  0451 09/24/21  1103   TROPONINI 0.13* 0.15* 0.13*     No results for input(s): BNP in the last 72 hours. No results for input(s): CHOL, HDL in the last 72 hours.     Invalid input(s): LDLCALCU  Recent Labs     09/23/21  0437 09/23/21  1000 09/24/21  0451   INR 4.07* 3.69* 2.98*       Assessment & Plan:    Patient Active Problem List:     Non-ST elevated myocardial infarction (non-STEMI) ([facility])     Anemia     DM (diabetes mellitus) ([address]     Neuropathy (HCC)     Multiple vessel coronary artery disease     Essential hypertension     Fall at home     CKD (chronic kidney disease) stage 3, GFR 30-59 ml/min     Mixed hyperlipidemia     GERD (gastroesophageal reflux and dry.   Neurological:      General: No focal deficit present.      Mental Status: He is alert.   Psychiatric:         Mood and Affect: Mood normal.        disease)     History of BPH     Morbid obesity with BMI of 45.0-49.9, adult ([facility])     S/P CABG x 3     PVC's (premature ventricular contractions)     Chronic atrial fibrillation ([facility])     Venous stasis ulcer ([address]     Septic shock ([facility])     Coronary artery disease involving native coronary artery of native heart without angina pectoris     Atrial fibrillation with RVR ([facility])     PAD (peripheral artery disease) ([facility])     S/P CABG (coronary artery bypass graft)     Streptococcal bacteremia     Leukocytosis     Sepsis ([address]     DAMIR (acute kidney injury) ([address]     Diarrhea of presumed infectious origin     Venous stasis dermatitis of both lower extremities     Abscess     Critical lower limb ischemia ([facility])     Liver abscess     Cholecystitis     Weakness of both lower extremities     Inability to walk     Biliary calculus with cholecystitis     Acute systolic CHF (congestive heart failure) ([facility])     Diabetic foot infection ([address]     Toe osteomyelitis, left ([facility])     H/O Clostridium difficile infection     Pure hypercholesterolemia     Acute on chronic diastolic heart failure ([address]     Surgical wound infection     Chronic osteomyelitis of left foot ([facility])     Slurred speech     Dizziness     Bilateral hand numbness     General weakness     Abnormal ECG     Abnormal myocardial perfusion study     Decubitus ulcer of heel, bilateral     Hypoglycemia     Hyperkalemia     Hydronephrosis     Fungemia     Congestive heart failure ([facility])     Coronary artery disease involving coronary bypass graft of native heart     CHF (congestive heart failure), NYHA class I, acute on chronic, combined ([address]     AMS (altered mental status)     Urinary tract infection associated with indwelling urethral catheter ([address]     Complicated UTI (urinary tract infection)     Infection associated with indwelling ureteral stent ([facility])     Multiple drug resistant organism (MDRO) culture positive     Acute CVA (cerebrovascular accident) ([address]     Ulcer of right heel, with fat layer exposed ([address]     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 If that is the case he will not require prolonged antibiotic course as he would achieve good source control. Anemia is chronic, he is stable does not need transfusion, I will however hold off on the amount of bloo ddraws he is getting. Will hold off on picc line as he probably does not need it. Npo after midnight in anticipation of possible procedure tomorrow. His acute renal failure is improving, likely prerenal, continue to monitor, his body weight was down by 18 pounds and its improved with fluid. dispo - will likely stay here through weekend if he does not get procedure tomorrow. The patient and / or the family were informed of the results of any tests, a time was given to answer questions, a plan was proposed and they agreed with plan.   Disposition: continue inpatient stay   Full MD Reyna Zhao

## 2025-02-22 LAB — S PYO THROAT QL CULT: NORMAL

## 2025-02-24 ENCOUNTER — PHARMACY VISIT (OUTPATIENT)
Dept: PHARMACY | Facility: CLINIC | Age: 60
End: 2025-02-24
Payer: COMMERCIAL

## 2025-02-27 ENCOUNTER — APPOINTMENT (OUTPATIENT)
Dept: ORTHOPEDIC SURGERY | Facility: CLINIC | Age: 60
End: 2025-02-27
Payer: COMMERCIAL

## 2025-02-28 ENCOUNTER — PHARMACY VISIT (OUTPATIENT)
Dept: PHARMACY | Facility: CLINIC | Age: 60
End: 2025-02-28
Payer: COMMERCIAL

## 2025-03-05 ENCOUNTER — APPOINTMENT (OUTPATIENT)
Dept: PHYSICAL THERAPY | Facility: CLINIC | Age: 60
End: 2025-03-05
Payer: COMMERCIAL

## 2025-03-11 ENCOUNTER — APPOINTMENT (OUTPATIENT)
Dept: PHARMACY | Facility: HOSPITAL | Age: 60
End: 2025-03-11
Payer: COMMERCIAL

## 2025-03-11 DIAGNOSIS — I10 PRIMARY HYPERTENSION: ICD-10-CM

## 2025-03-11 DIAGNOSIS — E11.65 TYPE 2 DIABETES MELLITUS WITH HYPERGLYCEMIA, WITHOUT LONG-TERM CURRENT USE OF INSULIN: ICD-10-CM

## 2025-03-11 RX ORDER — AMLODIPINE AND OLMESARTAN MEDOXOMIL 10; 40 MG/1; MG/1
1 TABLET ORAL DAILY
Qty: 90 TABLET | Refills: 3 | Status: SHIPPED | OUTPATIENT
Start: 2025-03-11

## 2025-03-11 NOTE — ASSESSMENT & PLAN NOTE
Patient's goal A1c is < 7%.  Is pt at goal? Yes, 5.7%  Patient's SMBGs are controlled.     Rationale for plan: The patient is feeling well since increasing Mounjaro to 5 mg weekly. He has not noticed decreased weight yet but has only had 2 doses so far. Will plan to continue at this time given his glucose is well controlled. Can increase again in the future if weight does not improve as expected.    Medication Changes:  CONTINUE  Mounjaro 5 mg weekly    Future Considerations:  Consider increase to 7.5 mg weekly if needed to promote weight loss    Monitoring and Education:   Monitor using Jacqueline CGM

## 2025-03-11 NOTE — ASSESSMENT & PLAN NOTE
ASSESSMENT OF SMBP MEASUREMENTS  Average: ~140/80  mmHg- has not replaced wrist monitor    Patient's goal BP < 130/80.   Rationale for plan: Home readings remain elevated. Given his home monitor read falsely low in office, it is likely closer to 150-160 systolic readings. We will plan to increase amlodipine component to 10 mg daily at this time. Warned of potential swelling risk- has previously noted that on different combination regimen- chlorthalidone may minimize this risk now. Reminded him to get labs done, including some which may reveal secondary causes of hypertension. Spironolactone would be next medication of choice if needed to control blood pressure.  CrCl cannot be calculated (Patient's most recent lab result is older than the maximum 3 days allowed.).    Medication Changes:  CONTINUE  Chlorthalidone 25 mg daily  INCREASE  Fernando to 10/40 mg daily    Future Considerations:  Await result of resistance labs- spironolactone next choice of medication unless conflicting secondary cause is found    Monitoring and Education Discussed:  Eat right: Your diet should be rich in fruits, vegetables, potassium, and low-fat dairy products. You should also reduce your intake of sodium and fats, particularly saturated fats.  Maintain a healthy weight: Try to achieve and maintain a healthy weight. If you are unsure what this means for you, please contact our clinic.  Exercise: Try to get at least 30 minutes of aerobic exercise every day.  Moderate your alcohol consumption: Limit your alcohol intake to one drink per day.  Monitor your blood pressure: You should check your blood pressure regularly. Notify our clinic if your blood pressure readings are consistently higher than recommended.

## 2025-03-11 NOTE — PROGRESS NOTES
Clinical Pharmacy Appointment    Patient ID: Kuldeep Kemp is a 60 y.o. male who presents for Diabetes and Hypertension.    Pt is here for Follow Up appointment.     Referring Provider: Ronda Wilcox APRN-*  PCP: Di Denise MD   Last visit with PCP: 1/9/2025   Next visit with PCP: 6/20/2025      Subjective     Interval History  Labs still not yet completed- have been ordered over 2 months now, reminded to complete again today  Did get his sleep study completed, hoping for CPAP    HPI  DIABETES MELLITUS TYPE 2:    Diagnosed with diabetes: Known diabetic complications: fatty liver.  Does patient follow with Endocrinology: No  Optometry: does believe he is due for a visit  Podiatry: patient denies sores or cuts on feet today      Current diabetic medications include:  Mounjaro 5 mg weekly    Clarifications to above regimen: 2 doses so far of increased Mounjaro, takes Sundays  Adverse Effects: no concerns     Past diabetic medications include:  Metformin     Glucose Readings:  Glucometer/CGM Type: CGM, not frequent use    Current home BG readings (mg/dL): almost 100% in range - has not worn new sensor lately - maintains around 120 mg/dL    Any episodes of hypoglycemia? Yes, occasionally wakes to alarm for low- no symptoms and seems to correct itself, likely due to pressure on sensor .  Did patient treat episode of hypoglycemia appropriately? N/A  Does the patient have a prescription for ready-to-use Glucagon? Not on insulin  Does pt have proteinuria? Not at this time    Lifestyle:  No weight loss noted yet with increased dose of Mounjaro  No significant lifestyle changes reported    2/2025:  Jacqueline often falls off early when he does wear them  Eating out a lot recently due to being busy, frequent business trip    1/17:  Exercise: trying to improve, in warmer weather likes riding bicycle    Hx:  Diet:  Does avoid processed foods and canned food, not much bread  BK: breakfast sandwich today- first time  in a month; connolly once per week  Snacks: very rarely chips or pretzels, tries to avoid  Drinks: water, coffee    Secondary Prevention:  Statin? No  ACE-I/ARB? Yes  Aspirin? Yes    Pertinent PMH Review:  PMH of Pancreatitis: No  PMH of Retinopathy: No  PMH of Urinary Tract Infections: No  PMH of MTC/MEN2: No    Immunizations:  Influenza? No  COVID? Yes  Pneumonia? Yes  Shingles? No  Hepatitis B? No     HYPERTENSION ASSESSMENT  Medication Therapy  Current Medications:   Amlodipine 5/olmesartan 40 mg daily  Chlorthalidone 25 mg daily  Previous Medications: amlodipine 10 mg, hydrochlorothiazide 25 mg, lisinopril (cough)      Clarifications to above regimen: no repeat labs since medication adjustment  Adverse Effects: no concerns     Home BP Monitoring  BP Cuff Type: Wrist monitor  Cuff Validated? Yes, not very accurate wrist monitor- about 20 mmHg below true readings    Current home BP readings: home monitor reading around 140/80- likely higher given office readings was 20 points off  Previous home BP readings: 135/85 mmHg- this reader is low compared to office and manual BP    BP Readings from Last 3 Encounters:   02/20/25 146/79   01/09/25 173/83   12/23/24 (!) 168/92       Lifestyle  See above    Sodium Intake:  caffeine intake: 2 cups of coffee in the morning; cut back from 3 pots of coffee ; very little added salt per the patient- avoids processed foods    Risk Assessment  Major Risk Factors Include:  Hypertension  Tobacco use  Diabetes mellitus  Age > 55 (men) or > 65 (women)  The 10-year ASCVD risk score (Eleuterio KO, et al., 2019) is: 30%    Values used to calculate the score:      Age: 60 years      Sex: Male      Is Non- : No      Diabetic: Yes      Tobacco smoker: No      Systolic Blood Pressure: 146 mmHg      Is BP treated: Yes      HDL Cholesterol: 33.3 mg/dL      Total Cholesterol: 188 mg/dL  Known target organ damage:  None    Secondary Prevention  On Statin?: No, previous  rosuvastatin  Immunizations Needed: Annual Flu and Shingrix  Tobacco Use:  vaping, occasional cigars      Medication Reconciliation:  Changed: no reported changes    Drug Interactions  The following drug interactions were noted:    Olmesartan use on potassium supplement- monitor potassium levels    Medication System Management  Patient's preferred pharmacy: [facility]  Adherence/Organization: reports taking medications as directed  Affordability/Accessibility: no concerns      Objective   Allergies   Allergen Reactions    Animal Dander Unknown    Cat Dander Unknown    Lisinopril Cough     Social History     Social History Narrative    Not on file      Medication Review  Current Outpatient Medications   Medication Instructions    amlodipine-olmesartan (Fernando) 10-40 mg tablet 1 tablet, oral, Daily    ascorbic acid (VITAMIN C) 500 mg, Daily    aspirin 81 mg, Daily    chlorthalidone (HYGROTON) 25 mg, oral, Daily    cholecalciferol (VITAMIN D-3) 25 mcg, Daily    dexAMETHasone (DECADRON) 1 mg, oral, Once, Take at 11pm the night before the cortisol lab test    flash glucose sensor kit (FreeStyle Jacqueline 2 Sensor) kit APPLY 1 SENSOR TO THE BACK OF THE ARM EVERY 14 DAYS TO MONITOR BLOOD SUGAR    glucosamine-chondroitin 500-400 mg tablet 1 tablet, 3 times daily    milk thistle 175 mg, Daily    Mounjaro 5 mg, subcutaneous, Weekly    potassium chloride CR (Klor-Con) 10 mEq ER tablet 10 mEq, oral, Daily, Do not crush, chew, or split.    triamcinolone (Kenalog) 0.1 % cream Apply to affected area 1-2 times daily as needed. Avoid face and groin.    TURMERIC ORAL 1 tablet, Daily      Vitals  BP Readings from Last 2 Encounters:   02/20/25 146/79   01/09/25 173/83     BMI Readings from Last 1 Encounters:   02/20/25 29.05 kg/m²      Labs  A1C  Lab Results   Component Value Date    HGBA1C 5.7 (H) 07/29/2024    HGBA1C 5.6 11/27/2023    HGBA1C 6.4 (A) 05/03/2023     BMP  Lab Results   Component Value Date    CALCIUM 9.2 07/29/2024      "07/29/2024    K 3.4 (L) 07/29/2024    CO2 28 07/29/2024     07/29/2024    BUN 15 07/29/2024    CREATININE 0.73 07/29/2024    EGFR >90 07/29/2024     LFTs  Lab Results   Component Value Date    ALT 19 07/29/2024    AST 21 07/29/2024    ALKPHOS 50 07/29/2024    BILITOT 0.7 07/29/2024     FLP  Lab Results   Component Value Date    TRIG 271 (H) 07/29/2024    CHOL 188 07/29/2024    LDLF 165 (H) 02/15/2023    LDLCALC 101 (H) 07/29/2024    HDL 33.3 07/29/2024     Urine Microalbumin  No results found for: \"MICROALBCREA\"  Weight Management  Wt Readings from Last 3 Encounters:   02/20/25 81.6 kg (180 lb)   02/18/25 82.1 kg (181 lb)   01/09/25 83.9 kg (185 lb)      There is no height or weight on file to calculate BMI.     Assessment/Plan   Problem List Items Addressed This Visit       Hypertension     ASSESSMENT OF SMBP MEASUREMENTS  Average: ~140/80  mmHg- has not replaced wrist monitor    Patient's goal BP < 130/80.   Rationale for plan: Home readings remain elevated. Given his home monitor read falsely low in office, it is likely closer to 150-160 systolic readings. We will plan to increase amlodipine component to 10 mg daily at this time. Warned of potential swelling risk- has previously noted that on different combination regimen- chlorthalidone may minimize this risk now. Reminded him to get labs done, including some which may reveal secondary causes of hypertension. Spironolactone would be next medication of choice if needed to control blood pressure.  CrCl cannot be calculated (Patient's most recent lab result is older than the maximum 3 days allowed.).    Medication Changes:  CONTINUE  Chlorthalidone 25 mg daily  INCREASE  Fernando to 10/40 mg daily    Future Considerations:  Await result of resistance labs- spironolactone next choice of medication unless conflicting secondary cause is found    Monitoring and Education Discussed:  Eat right: Your diet should be rich in fruits, vegetables, potassium, and low-fat " dairy products. You should also reduce your intake of sodium and fats, particularly saturated fats.  Maintain a healthy weight: Try to achieve and maintain a healthy weight. If you are unsure what this means for you, please contact our clinic.  Exercise: Try to get at least 30 minutes of aerobic exercise every day.  Moderate your alcohol consumption: Limit your alcohol intake to one drink per day.  Monitor your blood pressure: You should check your blood pressure regularly. Notify our clinic if your blood pressure readings are consistently higher than recommended.          Relevant Medications    amlodipine-olmesartan (Fernando) 10-40 mg tablet    Other Relevant Orders    Referral to Clinical Pharmacy    Type 2 diabetes mellitus with hyperglycemia (Multi)     Patient's goal A1c is < 7%.  Is pt at goal? Yes, 5.7%  Patient's SMBGs are controlled.     Rationale for plan: The patient is feeling well since increasing Mounjaro to 5 mg weekly. He has not noticed decreased weight yet but has only had 2 doses so far. Will plan to continue at this time given his glucose is well controlled. Can increase again in the future if weight does not improve as expected.    Medication Changes:  CONTINUE  Mounjaro 5 mg weekly    Future Considerations:  Consider increase to 7.5 mg weekly if needed to promote weight loss    Monitoring and Education:   Monitor using Jacqueline CGM         Relevant Orders    Referral to Clinical Pharmacy     Clinical Pharmacist follow-up: 4/3 8:40AM, Telehealth visit  The patient is not a chronic care management patient.    Continue all meds under the continuation of care with the referring provider and clinical pharmacy team.    Thank you,  Olimpia Bajwa, PharmD  Clinical Pharmacist  [phone]    Verbal consent to manage patient's drug therapy was obtained from the patient. They were informed they may decline to participate or withdraw from participation in pharmacy services at any time.

## 2025-03-12 ENCOUNTER — APPOINTMENT (OUTPATIENT)
Dept: PHYSICAL THERAPY | Facility: CLINIC | Age: 60
End: 2025-03-12
Payer: COMMERCIAL

## 2025-03-12 PROCEDURE — RXMED WILLOW AMBULATORY MEDICATION CHARGE

## 2025-03-19 ENCOUNTER — APPOINTMENT (OUTPATIENT)
Dept: PHYSICAL THERAPY | Facility: CLINIC | Age: 60
End: 2025-03-19
Payer: COMMERCIAL

## 2025-03-19 ENCOUNTER — PHARMACY VISIT (OUTPATIENT)
Dept: PHARMACY | Facility: CLINIC | Age: 60
End: 2025-03-19
Payer: COMMERCIAL

## 2025-03-26 ENCOUNTER — APPOINTMENT (OUTPATIENT)
Dept: PHYSICAL THERAPY | Facility: CLINIC | Age: 60
End: 2025-03-26
Payer: COMMERCIAL

## 2025-04-01 PROCEDURE — RXMED WILLOW AMBULATORY MEDICATION CHARGE

## 2025-04-02 ENCOUNTER — PHARMACY VISIT (OUTPATIENT)
Dept: PHARMACY | Facility: CLINIC | Age: 60
End: 2025-04-02
Payer: COMMERCIAL

## 2025-04-03 ENCOUNTER — APPOINTMENT (OUTPATIENT)
Dept: PHARMACY | Facility: HOSPITAL | Age: 60
End: 2025-04-03
Payer: COMMERCIAL

## 2025-04-03 DIAGNOSIS — I10 PRIMARY HYPERTENSION: ICD-10-CM

## 2025-04-03 DIAGNOSIS — E11.65 TYPE 2 DIABETES MELLITUS WITH HYPERGLYCEMIA, WITHOUT LONG-TERM CURRENT USE OF INSULIN: ICD-10-CM

## 2025-04-03 NOTE — PROGRESS NOTES
Clinical Pharmacy Appointment    Patient ID: Kuldeep Kemp is a 60 y.o. male who presents for Diabetes and Hypertension.    Pt is here for Follow Up appointment.     Referring Provider: Ronda Wilcox APRN-*  PCP: Di Denise MD   Last visit with PCP: 1/9/2025   Next visit with PCP: 6/20/2025      Subjective     Interval History  Labs still not yet completed- have been ordered over 2 months now, reminded to complete again today  Did get his sleep study completed, hoping for CPAP  Seeing again on 5/7 for CPAP supplies    HPI  DIABETES MELLITUS TYPE 2:    Diagnosed with diabetes: Known diabetic complications: fatty liver.  Does patient follow with Endocrinology: No  Optometry: does believe he is due for a visit  Podiatry: patient denies sores or cuts on feet today      Current diabetic medications include:  Mounjaro 5 mg weekly    Clarifications to above regimen: 2 doses so far of increased Mounjaro, takes Sundays  Adverse Effects: no concerns     Past diabetic medications include:  Metformin     Glucose Readings:  Glucometer/CGM Type: CGM, not frequent use    Current home BG readings (mg/dL): almost 100% in range - has not worn new sensor lately - maintains around 120 mg/dL    Any episodes of hypoglycemia? Yes, occasionally wakes to alarm for low- no symptoms and seems to correct itself, likely due to pressure on sensor .  Did patient treat episode of hypoglycemia appropriately? N/A  Does the patient have a prescription for ready-to-use Glucagon? Not on insulin  Does pt have proteinuria? Not at this time    Lifestyle:  Has noted a couple pounds of weight loss since increasing his Mounjaro dose  No significant lifestyle changes reported  Looking into [employer] and stem cell healing of joints  Carpal tunnel- hand numb while holding phone    2/2025:  Jacqueline often falls off early when he does wear them  Eating out a lot recently due to being busy, frequent business trip    1/17:  Exercise: trying to  improve, in warmer weather likes riding bicycle    Hx:  Diet:  Does avoid processed foods and canned food, not much bread  BK: breakfast sandwich today- first time in a month; connolly once per week  Snacks: very rarely chips or pretzels, tries to avoid  Drinks: water, coffee    Secondary Prevention:  Statin? No  ACE-I/ARB? Yes  Aspirin? Yes    Pertinent PMH Review:  PMH of Pancreatitis: No  PMH of Retinopathy: No  PMH of Urinary Tract Infections: No  PMH of MTC/MEN2: No    Immunizations:  Influenza? No  COVID? Yes  Pneumonia? Yes  Shingles? No  Hepatitis B? No     HYPERTENSION ASSESSMENT  Medication Therapy  Current Medications:   Amlodipine 10/olmesartan 40 mg daily  Chlorthalidone 25 mg daily  Previous Medications: amlodipine 10 mg, hydrochlorothiazide 25 mg, lisinopril (cough)      Clarifications to above regimen: no repeat labs since medication adjustment  Adverse Effects: no concerns     Home BP Monitoring  BP Cuff Type: Wrist monitor  Cuff Validated? Yes, not very accurate wrist monitor- about 20 mmHg below true readings    Current home BP readings: improved to about 130/70 on home monitor  Previous home BP readings: home monitor reading around 140/80- likely higher given office readings was 20 points off    BP Readings from Last 3 Encounters:   02/20/25 146/79   01/09/25 173/83   12/23/24 (!) 168/92       Lifestyle  See above    Sodium Intake:  caffeine intake: 2 cups of coffee in the morning; cut back from 3 pots of coffee ; very little added salt per the patient- avoids processed foods    Risk Assessment  Major Risk Factors Include:  Hypertension  Tobacco use  Diabetes mellitus  Age > 55 (men) or > 65 (women)  The 10-year ASCVD risk score (Eleuterio KO, et al., 2019) is: 30%    Values used to calculate the score:      Age: 60 years      Sex: Male      Is Non- : No      Diabetic: Yes      Tobacco smoker: No      Systolic Blood Pressure: 146 mmHg      Is BP treated: Yes      HDL  Cholesterol: 33.3 mg/dL      Total Cholesterol: 188 mg/dL  Known target organ damage:  None    Secondary Prevention  On Statin?: No, previous rosuvastatin  Immunizations Needed: Annual Flu and Shingrix  Tobacco Use:  vaping, occasional cigars      Medication Reconciliation:  Changed: no reported changes    Drug Interactions  The following drug interactions were noted:    Olmesartan use on potassium supplement- monitor potassium levels    Medication System Management  Patient's preferred pharmacy: [facility]  Adherence/Organization: reports taking medications as directed  Affordability/Accessibility: no concerns      Objective   Allergies   Allergen Reactions    Animal Dander Unknown    Cat Dander Unknown    Lisinopril Cough     Social History     Social History Narrative    Not on file      Medication Review  Current Outpatient Medications   Medication Instructions    amlodipine-olmesartan (Fernando) 10-40 mg tablet 1 tablet, oral, Daily    ascorbic acid (VITAMIN C) 500 mg, Daily    aspirin 81 mg, Daily    chlorthalidone (HYGROTON) 25 mg, oral, Daily    cholecalciferol (VITAMIN D-3) 25 mcg, Daily    dexAMETHasone (DECADRON) 1 mg, oral, Once, Take at 11pm the night before the cortisol lab test    flash glucose sensor kit (FreeStyle Jacqueline 2 Sensor) kit APPLY 1 SENSOR TO THE BACK OF THE ARM EVERY 14 DAYS TO MONITOR BLOOD SUGAR    glucosamine-chondroitin 500-400 mg tablet 1 tablet, 3 times daily    milk thistle 175 mg, Daily    Mounjaro 5 mg, subcutaneous, Weekly    potassium chloride CR (Klor-Con) 10 mEq ER tablet 10 mEq, oral, Daily, Do not crush, chew, or split.    triamcinolone (Kenalog) 0.1 % cream Apply to affected area 1-2 times daily as needed. Avoid face and groin.    TURMERIC ORAL 1 tablet, Daily      Vitals  BP Readings from Last 2 Encounters:   02/20/25 146/79   01/09/25 173/83     BMI Readings from Last 1 Encounters:   02/20/25 29.05 kg/m²      Labs  A1C  Lab Results   Component Value Date    HGBA1C 5.7 (H)  "07/29/2024    HGBA1C 5.6 11/27/2023    HGBA1C 6.4 (A) 05/03/2023     BMP  Lab Results   Component Value Date    CALCIUM 9.2 07/29/2024     07/29/2024    K 3.4 (L) 07/29/2024    CO2 28 07/29/2024     07/29/2024    BUN 15 07/29/2024    CREATININE 0.73 07/29/2024    EGFR >90 07/29/2024     LFTs  Lab Results   Component Value Date    ALT 19 07/29/2024    AST 21 07/29/2024    ALKPHOS 50 07/29/2024    BILITOT 0.7 07/29/2024     FLP  Lab Results   Component Value Date    TRIG 271 (H) 07/29/2024    CHOL 188 07/29/2024    LDLF 165 (H) 02/15/2023    LDLCALC 101 (H) 07/29/2024    HDL 33.3 07/29/2024     Urine Microalbumin  No results found for: \"MICROALBCREA\"  Weight Management  Wt Readings from Last 3 Encounters:   02/20/25 81.6 kg (180 lb)   02/18/25 82.1 kg (181 lb)   01/09/25 83.9 kg (185 lb)      There is no height or weight on file to calculate BMI.     Assessment/Plan   Problem List Items Addressed This Visit       Hypertension     ASSESSMENT OF SMBP MEASUREMENTS  Average: ~130/70  mmHg on home monitor- this has historically read lower than office readings    Patient's goal BP < 130/80.   Rationale for plan: The patient has seen improvement to average blood pressure readings with increase to amlodipine. He has not yet gotten resistance labs drawn. Hesitant to add medication until these labs are completed. He also plans to make several interventions to lifestyle including working on joint pain relief and adding CPAP to help sleep quality which should positively affect his blood pressures. Given this, no medication changes today. We will discuss potential changes to medications in 2 months after he has CPAP and has further pursued joint pain relief. If blood pressure improves, no need for further therapy. If not, resistance labs will be needed to determine next therapy of choice as he is now at target doses of olmesartan, amlodipine, and chlorthalidone.  CrCl cannot be calculated (Patient's most recent lab " result is older than the maximum 3 days allowed.).    Medication Changes:  CONTINUE  Fernando 10/40 mg daily  Chlorthalidone 25 mg daily    Future Considerations:  Relief of joint pain through [employer] and sleep improvement with CPAP supplies may help lower BP in next 2 months  Needs to complete resistance labs for future medication preference if remains elevated    Monitoring and Education Discussed:  Eat right: Your diet should be rich in fruits, vegetables, potassium, and low-fat dairy products. You should also reduce your intake of sodium and fats, particularly saturated fats.  Maintain a healthy weight: Try to achieve and maintain a healthy weight. If you are unsure what this means for you, please contact our clinic.  Exercise: Try to get at least 30 minutes of aerobic exercise every day.  Moderate your alcohol consumption: Limit your alcohol intake to one drink per day.  Monitor your blood pressure: You should check your blood pressure regularly. Notify our clinic if your blood pressure readings are consistently higher than recommended.          Type 2 diabetes mellitus with hyperglycemia (Multi)     Patient's goal A1c is < 7%.  Is pt at goal? Yes, 5.7%, due for repeat  Patient's SMBGs are controlled.     Rationale for plan: The patient is feeling well on newly increased Mounjaro 5 mg dose. He has noticed a few pounds of weight loss and is satisfied with his weight response at this point. Given his glucose continues to respond well to this regimen, he complains of no side effects, and is seeing improved weight, no change to his current dose.    Medication Changes:  CONTINUE  Mounjaro 5 mg weekly    Monitoring and Education:   Due for repeat A1c, has been 9 months- has not yet gotten other labs on order            Clinical Pharmacist follow-up: 6/3 8:40AM, Telehealth visit  The patient is not a chronic care management patient.    Continue all meds under the continuation of care with the referring provider and  clinical pharmacy team.    Thank you,  Olimpia Bajwa, PharmD  Clinical Pharmacist  [phone]    Verbal consent to manage patient's drug therapy was obtained from the patient. They were informed they may decline to participate or withdraw from participation in pharmacy services at any time.

## 2025-04-03 NOTE — ASSESSMENT & PLAN NOTE
ASSESSMENT OF SMBP MEASUREMENTS  Average: ~130/70  mmHg on home monitor- this has historically read lower than office readings    Patient's goal BP < 130/80.   Rationale for plan: The patient has seen improvement to average blood pressure readings with increase to amlodipine. He has not yet gotten resistance labs drawn. Hesitant to add medication until these labs are completed. He also plans to make several interventions to lifestyle including working on joint pain relief and adding CPAP to help sleep quality which should positively affect his blood pressures. Given this, no medication changes today. We will discuss potential changes to medications in 2 months after he has CPAP and has further pursued joint pain relief. If blood pressure improves, no need for further therapy. If not, resistance labs will be needed to determine next therapy of choice as he is now at target doses of olmesartan, amlodipine, and chlorthalidone.  CrCl cannot be calculated (Patient's most recent lab result is older than the maximum 3 days allowed.).    Medication Changes:  CONTINUE  Fernando 10/40 mg daily  Chlorthalidone 25 mg daily    Future Considerations:  Relief of joint pain through [employer] and sleep improvement with CPAP supplies may help lower BP in next 2 months  Needs to complete resistance labs for future medication preference if remains elevated    Monitoring and Education Discussed:  Eat right: Your diet should be rich in fruits, vegetables, potassium, and low-fat dairy products. You should also reduce your intake of sodium and fats, particularly saturated fats.  Maintain a healthy weight: Try to achieve and maintain a healthy weight. If you are unsure what this means for you, please contact our clinic.  Exercise: Try to get at least 30 minutes of aerobic exercise every day.  Moderate your alcohol consumption: Limit your alcohol intake to one drink per day.  Monitor your blood pressure: You should check your blood pressure  regularly. Notify our clinic if your blood pressure readings are consistently higher than recommended.

## 2025-04-03 NOTE — Clinical Note
Patient is asking about carpal tunnel and what he can do to relieve those symptoms. Said hand was numb while holding the phone to talk to me. Feels outside my normal realm of practice- any suggestions for him on management of this, who to speak with, and a possible timeline of when he might feel better was specifically what he asked about. DM2 (diabetes mellitus, type 2)

## 2025-04-03 NOTE — ASSESSMENT & PLAN NOTE
Patient's goal A1c is < 7%.  Is pt at goal? Yes, 5.7%, due for repeat  Patient's SMBGs are controlled.     Rationale for plan: The patient is feeling well on newly increased Mounjaro 5 mg dose. He has noticed a few pounds of weight loss and is satisfied with his weight response at this point. Given his glucose continues to respond well to this regimen, he complains of no side effects, and is seeing improved weight, no change to his current dose.    Medication Changes:  CONTINUE  Mounjaro 5 mg weekly    Monitoring and Education:   Due for repeat A1c, has been 9 months- has not yet gotten other labs on order

## 2025-04-04 ENCOUNTER — APPOINTMENT (OUTPATIENT)
Dept: CARDIOLOGY | Facility: CLINIC | Age: 60
End: 2025-04-04
Payer: COMMERCIAL

## 2025-04-07 ENCOUNTER — TELEPHONE (OUTPATIENT)
Dept: PRIMARY CARE | Facility: CLINIC | Age: 60
End: 2025-04-07
Payer: COMMERCIAL

## 2025-04-07 DIAGNOSIS — G56.03 CARPAL TUNNEL SYNDROME ON BOTH SIDES: ICD-10-CM

## 2025-04-07 NOTE — TELEPHONE ENCOUNTER
----- Message from Di Denise sent at 4/4/2025  4:41 PM EDT -----  Agree and he may need an emg to see how severe it is  ----- Message -----  From: JEANNA Shell  Sent: 4/3/2025  12:17 PM EDT  To: Di Denise MD; #    I always have patients try a brace that cocks the wrist up to see if that helps. Also, he can see the hand specialist  ----- Message -----  From: Olimpia Bajwa, Milagros  Sent: 4/3/2025  12:01 PM EDT  To: JEANNA Shell; #    Patient is asking about carpal tunnel and what he can do to relieve those symptoms. Said hand was numb while holding the phone to talk to me. Feels outside my normal realm of practice- any suggestions for him on management of this, who to speak with, and a possible timeline of when he might feel better was specifically what he asked about.

## 2025-04-07 NOTE — TELEPHONE ENCOUNTER
Patient would not like to do an emg at this time agrees with referral to hand specialist   Referral made

## 2025-04-08 ENCOUNTER — APPOINTMENT (OUTPATIENT)
Dept: CARDIOLOGY | Facility: CLINIC | Age: 60
End: 2025-04-08
Payer: COMMERCIAL

## 2025-04-25 ENCOUNTER — HOSPITAL ENCOUNTER (EMERGENCY)
Facility: HOSPITAL | Age: 60
Discharge: HOME | End: 2025-04-25
Attending: EMERGENCY MEDICINE
Payer: MEDICARE

## 2025-04-25 VITALS
HEIGHT: 66 IN | HEART RATE: 86 BPM | OXYGEN SATURATION: 95 % | TEMPERATURE: 98.2 F | SYSTOLIC BLOOD PRESSURE: 170 MMHG | RESPIRATION RATE: 17 BRPM | WEIGHT: 180 LBS | BODY MASS INDEX: 28.93 KG/M2 | DIASTOLIC BLOOD PRESSURE: 82 MMHG
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
## 2025-04-25 DIAGNOSIS — S00.81XA ABRASION OF CHIN, INITIAL ENCOUNTER: ICD-10-CM

## 2025-04-25 PROCEDURE — 99283 EMERGENCY DEPT VISIT LOW MDM: CPT | Performed by: EMERGENCY MEDICINE

## 2025-04-25 PROCEDURE — 2500000005 HC RX 250 GENERAL PHARMACY W/O HCPCS

## 2025-04-25 RX ORDER — BACITRACIN ZINC 500 UNIT/G
OINTMENT IN PACKET (EA) TOPICAL ONCE
Status: COMPLETED | OUTPATIENT
Start: 2025-04-25 | End: 2025-04-25

## 2025-04-25 RX ADMIN — BACITRACIN 1 G: 500 OINTMENT TOPICAL at 17:46

## 2025-04-25 ASSESSMENT — PAIN SCALES - GENERAL: PAINLEVEL_OUTOF10: 5 - MODERATE PAIN

## 2025-04-25 ASSESSMENT — PAIN DESCRIPTION - PAIN TYPE: TYPE: ACUTE PAIN

## 2025-04-25 ASSESSMENT — PAIN - FUNCTIONAL ASSESSMENT: PAIN_FUNCTIONAL_ASSESSMENT: 0-10
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
## 2025-04-25 NOTE — Clinical Note
Kuldeep Kemp was seen and treated in our emergency department on 4/25/2025.  He may return to work on 04/28/2025.       If you have any questions or concerns, please don't hesitate to call.      Hal Nielsen, DO
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
## 2025-04-28 ENCOUNTER — PHARMACY VISIT (OUTPATIENT)
Dept: PHARMACY | Facility: CLINIC | Age: 60
End: 2025-04-28
Payer: COMMERCIAL

## 2025-04-28 ENCOUNTER — TELEPHONE (OUTPATIENT)
Dept: PRIMARY CARE | Facility: CLINIC | Age: 60
End: 2025-04-28
Payer: COMMERCIAL

## 2025-04-28 ENCOUNTER — PATIENT OUTREACH (OUTPATIENT)
Dept: CARE COORDINATION | Facility: CLINIC | Age: 60
End: 2025-04-28
Payer: COMMERCIAL

## 2025-04-28 DIAGNOSIS — I10 PRIMARY HYPERTENSION: ICD-10-CM

## 2025-04-28 PROCEDURE — RXMED WILLOW AMBULATORY MEDICATION CHARGE

## 2025-04-28 RX ORDER — CHLORTHALIDONE 25 MG/1
25 TABLET ORAL DAILY
Qty: 90 TABLET | Refills: 3 | Status: SHIPPED | OUTPATIENT
Start: 2025-04-28 | End: 2026-04-28

## 2025-04-28 NOTE — PROGRESS NOTES
Attempted outreach post ED visit, left a voice message with my contact information.  No further outreach at this time  mitchel FOWLERN, RN, [facility]  O: [phone]
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
## 2025-04-29 ENCOUNTER — OFFICE VISIT (OUTPATIENT)
Dept: PRIMARY CARE | Facility: CLINIC | Age: 60
End: 2025-04-29
Payer: COMMERCIAL

## 2025-04-29 VITALS
WEIGHT: 180.6 LBS | OXYGEN SATURATION: 96 % | DIASTOLIC BLOOD PRESSURE: 85 MMHG | HEIGHT: 66 IN | HEART RATE: 79 BPM | SYSTOLIC BLOOD PRESSURE: 148 MMHG | TEMPERATURE: 98.2 F | BODY MASS INDEX: 29.02 KG/M2

## 2025-04-29 DIAGNOSIS — E53.8 VITAMIN B12 DEFICIENCY: ICD-10-CM

## 2025-04-29 DIAGNOSIS — I10 PRIMARY HYPERTENSION: ICD-10-CM

## 2025-04-29 DIAGNOSIS — T14.8XXA ABRASION: ICD-10-CM

## 2025-04-29 DIAGNOSIS — E11.65 TYPE 2 DIABETES MELLITUS WITH HYPERGLYCEMIA, WITHOUT LONG-TERM CURRENT USE OF INSULIN: ICD-10-CM
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
## 2025-04-29 PROCEDURE — 3008F BODY MASS INDEX DOCD: CPT | Performed by: NURSE PRACTITIONER

## 2025-04-29 PROCEDURE — 99214 OFFICE O/P EST MOD 30 MIN: CPT | Performed by: NURSE PRACTITIONER

## 2025-04-29 PROCEDURE — 3079F DIAST BP 80-89 MM HG: CPT | Performed by: NURSE PRACTITIONER

## 2025-04-29 PROCEDURE — 1036F TOBACCO NON-USER: CPT | Performed by: NURSE PRACTITIONER

## 2025-04-29 PROCEDURE — 3077F SYST BP >= 140 MM HG: CPT | Performed by: NURSE PRACTITIONER

## 2025-04-29 RX ORDER — AMLODIPINE BESYLATE AND OLMESARTAN MEDOXOMIL 10; 40 MG/1; MG/1
1 TABLET ORAL DAILY
Qty: 90 TABLET | Refills: 3 | Status: CANCELLED | OUTPATIENT
Start: 2025-04-29

## 2025-04-29 RX ORDER — CHLORTHALIDONE 25 MG/1
25 TABLET ORAL DAILY
Qty: 90 TABLET | Refills: 3 | Status: CANCELLED | OUTPATIENT
Start: 2025-04-29 | End: 2026-04-29

## 2025-04-29 RX ORDER — POTASSIUM CHLORIDE 750 MG/1
10 TABLET, FILM COATED, EXTENDED RELEASE ORAL DAILY
Qty: 30 TABLET | Refills: 11 | Status: CANCELLED | OUTPATIENT
Start: 2025-04-29 | End: 2026-04-29

## 2025-04-29 ASSESSMENT — PATIENT HEALTH QUESTIONNAIRE - PHQ9
SUM OF ALL RESPONSES TO PHQ9 QUESTIONS 1 AND 2: 0
2. FEELING DOWN, DEPRESSED OR HOPELESS: NOT AT ALL
1. LITTLE INTEREST OR PLEASURE IN DOING THINGS: NOT AT ALL

## 2025-04-29 NOTE — ASSESSMENT & PLAN NOTE
Discussed whether there is contraindication between Moringa and Mounjaro  Looked up interaction on UpToDate  No interaction found  He would like to continue Moringa  Discussed risks vs benefit, pt aware    Also discussed completing DMST  Gave him hand out on directions

## 2025-04-29 NOTE — PROGRESS NOTES
Patient: Kuldeep Kemp  : 1965 AGE: 60 y.o. SEX:male   MRN: [id number]   Provider: NONI Henderson-CNP     Location [facility]   Service Date: 2025     PCP: Di Denise MD   Referred by: Di Denise, *          [facility]  New Visit Note      HISTORY OF PRESENT ILLNESS     Kuldeep Kemp is a 60 y.o. male with a h/o ROBERT, hypertension, diabetes who presents to [facility].    2025: NPV with concerns of ROBERT management, recent sleep study.  HSAT 2025 showing severe ROBERT.  Patient was tested due to loud snoring and high BP. ---> Start APAP       SLEEP STUDY HISTORY (personally reviewed raw data such as interpretation report, data sheet, hypnogram, and titration table if available and applicable)  - HSAT 2025-showing severe ROBERT with JAMES 3% 42.1, JAMES 4% 32.2, SpO2 dieter 77%    SLEEP-WAKE SCHEDULE    Sleep Patterns: He does not have a usual bed partner. In terms of the patient's sleep/wake cycle, he generally gets into bed at approximately 10:30 PM.  his latency to sleep onset after lights out is <30min. During the night, the patient generally awakens 1-2 times nightly. These awakenings are usually brief in duration. Final wake time on weekday mornings is around 5:15 AM.    Compared to weekdays, the patient's sleep schedule is  similar on the weekends. Wake time 8AM.     Breathing during sleep: snoring, snorting during sleep, and witnessed apneas  Behaviors at night: No   Sleep paralysis: No   Hypnogogic or hypnopompic hallucinations: No   Cataplexy: No     RLS screen: Patient denies RLS symptoms.    Daytime Symptoms:  On awakening patient reports: feels sleepy and morning dry mouth  Patient report some daytime symptoms including: DAYTIME SYMPTOMS: reports sleep inertia    Sleep environment:  Preferred sleep position: side  Room is dark: Yes  Room is quiet: Yes  Room is cool: Yes  Bed  "comfort: good    SLEEP HABITS  Caffeine consumption: Yes, Patient consumes caffeine beverage regularly, about 3-4 cup(s)/day.  Alcohol consumption: Yes, Patient consumes alcohol regularly, about 3+ drink(s)/week.  Smoking: Yes, vapes occasionally   Marijuana: No  Sleep aids: denies     WEIGHT: stable    ESS:    MYNOR:      REVIEW OF SYSTEMS     All other systems have been reviewed and are negative.    ALLERGIES     Allergies[1]    MEDICATIONS     Current Medications[2]    PAST HISTORIES     PERTINENT PAST MEDICAL HISTORY: See HPI    PERTINENT PAST SURGICAL HISTORY for Sleep Medicine:  non-contributory    PERTINENT FAMILY HISTORY for Sleep Medicine:  Unknown. Patient is adopted.     PERTINENT SOCIAL HISTORY:  He  reports that he quit smoking about 2 years ago. His smoking use included cigarettes. He has been exposed to tobacco smoke. He has never used smokeless tobacco. He reports current alcohol use of about 6.0 standard drinks of alcohol per week. He reports current drug use. Drug: Marijuana.     Active Problems, Allergy List, Medication List, and PMH/PSH/FH/Social Hx have been reviewed and reconciled in chart. No significant changes unless documented in the pertinent chart section. Updates made when necessary.     PHYSICAL EXAM     VITAL SIGNS: /74   Pulse 84   Temp 36.4 °C (97.5 °F)   Ht 1.676 m (5' 6\")   Wt 83.9 kg (185 lb)   SpO2 96%   BMI 29.86 kg/m²     CURRENT WEIGHT:   Vitals:    05/07/25 1449   Weight: 83.9 kg (185 lb)      PREVIOUS WEIGHTS:  Wt Readings from Last 3 Encounters:   05/07/25 83.9 kg (185 lb)   04/29/25 81.9 kg (180 lb 9.6 oz)   04/25/25 81.6 kg (180 lb)     Physical Exam  Constitutional: Awake, not in distress  Skin: Warm, no rash  Neuro: No tremors, moves all extremities  Psych: alert and oriented to time, place, and person    HEENT:   Tonsils enlargement grade 2+   Airway comments: narrow lateral walls   Tongue scalloping: slight   Modified Mallampati score - 3    RESULTS/DATA " "    No results found for: \"IRON\", \"TRANSFERRIN\", \"IRONSAT\", \"TIBC\", \"FERRITIN\"    Bicarbonate   Date Value Ref Range Status   07/29/2024 28 21 - 32 mmol/L Final       ASSESSMENT/PLAN     Mr. Kemp is a 60 y.o. male and He was referred to the [facility] for evaluation of ROBERT    Problem List, Orders, Assessment, Recommendations:    # ROBERT, severe  - HSAT 2/13/2025-showing severe ROBERT with JAMES 3% 42.1, JAMES 4% 32.2, SpO2 dieter 77%  - Personally reviewed the sleep study's raw data such as interpretation report, data sheet, and hypnogram. Discussed sleep study results with patient today.    - Will start APAP 5-15 cwp via DME- [facility] (expedite due to ROBERT severity)  - Sleep apnea, PAP therapy education as well as the tips to be successful with PAP treatment was provided at length in clinic today. Patient verbalized understanding.  - Discussed 30-day mask guarantee and insurance requirement regarding PAP compliance and follow-up.   - Diet, exercise, and weight loss were emphasized today in clinic, as were non-supine sleep, avoiding alcohol in the late evening, and driving or operating heavy machinery when sleepy.   - Patient will follow-up in 2-3 months and bring equipment to the follow-up clinic     #HTN  BP Readings from Last 1 Encounters:   05/07/25 145/74     - BP slightly elevated today, asymptomatic, denies any headache, blurry vision, chest pain, palpitation, dizziness, lightheadedness, or syncopal episodes  - discussed at length the impact of untreated ROBERT and BP control  - supportive management: low salt DASH diet (less than 2000 mg sodium intake daily), moderate intensity aerobic exercise at least 30 minutes 5 days per week, reduce stress, quit smoking, limit alcohol, lose weight, and monitor BP once daily  - continue current management and follow-up with PCP     #Overweight  BMI Readings from Last 1 Encounters:   05/07/25 29.86 kg/m²     - Encouraged healthy weight loss via diet and " exercise  - Weight loss can help in the long term treatment of ROBERT.  - Defer management to PCP     #Elliott nicotine  - Occasional use, smoking cessation encouraged     All of patient's questions were answered. He verbalizes understanding and agreement with my assessment and plan.    Disposition    Follow up 31-90 days after starting PAP therapy             [1]   Allergies  Allergen Reactions    Animal Dander Unknown    Cat Dander Unknown    Lisinopril Cough   [2]   Current Outpatient Medications   Medication Sig Dispense Refill    amlodipine-olmesartan (Fernando) 10-40 mg tablet Take 1 tablet by mouth once daily. 90 tablet 3    ascorbic acid (Vitamin C) 500 mg tablet Take 1 tablet (500 mg) by mouth once daily.      aspirin 81 mg EC tablet Take 1 tablet (81 mg) by mouth once daily.      chlorthalidone (Hygroton) 25 mg tablet Take 1 tablet (25 mg) by mouth once daily. 90 tablet 3    cholecalciferol (Vitamin D-3) 25 MCG (1000 UT) capsule Take 1 capsule (25 mcg) by mouth once daily.      dexAMETHasone (Decadron) 1 mg tablet Take 1 tablet (1 mg) by mouth 1 time for 1 dose. Take at 11pm the night before the cortisol lab test 1 tablet 0    flash glucose sensor kit (FreeStyle Jacqueline 2 Sensor) kit APPLY 1 SENSOR TO THE BACK OF THE ARM EVERY 14 DAYS TO MONITOR BLOOD SUGAR 2 each 11    glucosamine-chondroitin 500-400 mg tablet Take 1 tablet by mouth 3 times a day.      milk thistle 175 mg tablet Take 1 tablet (175 mg) by mouth once daily.      NON FORMULARY Moringa      potassium chloride CR (Klor-Con) 10 mEq ER tablet Take 1 tablet (10 mEq) by mouth once daily. Do not crush, chew, or split. 30 tablet 11    tirzepatide (Mounjaro) 5 mg/0.5 mL pen injector Inject 5 mg under the skin 1 (one) time per week. 2 mL 11    triamcinolone (Kenalog) 0.1 % cream Apply to affected area 1-2 times daily as needed. Avoid face and groin. 453.6 g 1    TURMERIC ORAL Take 1 tablet by mouth once daily.       No current facility-administered medications  for this visit.
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
## 2025-05-01 LAB
ALDOST SERPL-MCNC: NORMAL NG/DL
ALDOST/RENIN PLAS-RTO: NORMAL {RATIO}
CORTIS AM PEAK SERPL-MCNC: 1.7 MCG/DL
DEXAMETHASONE SERPL-MCNC: NORMAL NG/DL
EST. AVERAGE GLUCOSE BLD GHB EST-MCNC: 151 MG/DL
EST. AVERAGE GLUCOSE BLD GHB EST-SCNC: 8.4 MMOL/L
HBA1C MFR BLD: 6.9 %
RENIN PLAS-CCNC: NORMAL NG/ML/H
TSH SERPL-ACNC: 1.61 MIU/L (ref 0.4–4.5)

## 2025-05-07 ENCOUNTER — OFFICE VISIT (OUTPATIENT)
Facility: CLINIC | Age: 60
End: 2025-05-07
Payer: COMMERCIAL

## 2025-05-07 VITALS
BODY MASS INDEX: 29.73 KG/M2 | HEIGHT: 66 IN | DIASTOLIC BLOOD PRESSURE: 74 MMHG | SYSTOLIC BLOOD PRESSURE: 145 MMHG | WEIGHT: 185 LBS | HEART RATE: 84 BPM | TEMPERATURE: 97.5 F | OXYGEN SATURATION: 96 %

## 2025-05-07 DIAGNOSIS — R06.83 SNORING: ICD-10-CM

## 2025-05-07 DIAGNOSIS — I10 PRIMARY HYPERTENSION: ICD-10-CM

## 2025-05-07 DIAGNOSIS — Z72.0 VAPES NICOTINE CONTAINING SUBSTANCE: ICD-10-CM

## 2025-05-07 DIAGNOSIS — E66.3 OVERWEIGHT (BMI 25.0-29.9): ICD-10-CM

## 2025-05-07 DIAGNOSIS — G47.33 OBSTRUCTIVE SLEEP APNEA: Primary | ICD-10-CM

## 2025-05-07 PROBLEM — R29.818 SUSPECTED SLEEP APNEA: Status: RESOLVED | Noted: 2023-12-06 | Resolved: 2025-05-07

## 2025-05-07 PROCEDURE — 1036F TOBACCO NON-USER: CPT | Performed by: NURSE PRACTITIONER

## 2025-05-07 PROCEDURE — 3008F BODY MASS INDEX DOCD: CPT | Performed by: NURSE PRACTITIONER

## 2025-05-07 PROCEDURE — 99214 OFFICE O/P EST MOD 30 MIN: CPT | Performed by: NURSE PRACTITIONER

## 2025-05-07 PROCEDURE — 99204 OFFICE O/P NEW MOD 45 MIN: CPT | Performed by: NURSE PRACTITIONER

## 2025-05-07 PROCEDURE — 3078F DIAST BP <80 MM HG: CPT | Performed by: NURSE PRACTITIONER

## 2025-05-07 PROCEDURE — 3077F SYST BP >= 140 MM HG: CPT | Performed by: NURSE PRACTITIONER

## 2025-05-07 ASSESSMENT — PATIENT HEALTH QUESTIONNAIRE - PHQ9
2. FEELING DOWN, DEPRESSED OR HOPELESS: NOT AT ALL
SUM OF ALL RESPONSES TO PHQ9 QUESTIONS 1 AND 2: 0
1. LITTLE INTEREST OR PLEASURE IN DOING THINGS: NOT AT ALL

## 2025-05-07 NOTE — PATIENT INSTRUCTIONS
[facility] Sleep Medicine   [address] [facility]  [address] [address]       NAME: Kuldeep Kemp   DATE: 05/07/25    Your Sleep Provider Today: JEANNA Henderson  Your Primary Care Physician: Di Denise MD       DIAGNOSIS:   1. Obstructive sleep apnea        2. Primary hypertension        3. Snoring  Referral to Adult Sleep Medicine          Thank you for coming to the Sleep Medicine Clinic today! Your sleep medicine provider today was: JEANNA Henderson Below is a summary of your treatment plan, other important information, and our contact numbers:      TREATMENT PLAN     - Follow-up in 3 months.  - If not already done, sign up for 'My Chart' and send prescription requests or messages through this    Obstructive sleep apnea (ROBERT): ROBERT is a sleep disorder where your upper airway muscles relax during sleep and the airway intermittently and repetitively narrows and collapses leading to blocked airway (apnea) which, in turn, can disrupt breathing in sleep, lower oxygen levels while you sleep and cause night time wakings. Because apnea may cause higher carbon dioxide or low oxygen levels, untreated ROBERT can lead to heart arrhythmia, elevation of blood pressure, and make it harder for the body to consolidate memory and metabolize (leading to higher blood sugars at night).   Frequent partial arousals occur during sleep resulting in sleep deprivation and daytime sleepiness. ROBERT is associated with an increased risk of cardiovascular disease, stroke, hypertension, and insulin resistance. Moreover, untreated ROBERT with excessive daytime sleepiness can increase the risk of motor vehicular accidents.    Some conservative strategies for ROBERT are:   Positional therapy - Avoid sleeping on your back.   Healthy diet, exercise, and optimizing weight encouraged.   Avoid alcohol late in the evening as it can make sleep apnea worse.     Safety: Avoid driving  "and operating heavy equipment while sleepy. Drowsy driving may lead to life-threatening motor vehicle accidents.     Common treatment options for sleep apnea include weight management, positional therapy, Positive Airway Therapy (PAP) therapy, oral appliance therapy, hypoglossal nerve stimulation, and select airway surgeries.    Starting Positive Airway Pressure: You were ordered a device to wear when you sleep called PAP (Positive Airway Pressure) to treat your sleep apnea. The order will be submitted to a durable medical equipment company who will arrange setting you up with the device. They will provide all the necessary equipment and discuss use and maintenance of the device with you.     **Please bring all PAP equipment with you to follow up appointments unless told otherwise.**           Important things to keep in mind as you start PAP    Insurance will monitor your usage during the first 90 days.  You should use your PAP - \"all night, every night\", for your health. The bare minimum is to use your PAP device while sleeping = at least 4 hours per day at least 5 days per week. Otherwise, your PAP device may be reclaimed by your PAP vendor at 90 days.  There are many mask to choose from to wear with your PAP machine. If you are not comfortable with the first mask issued to you, call your DME and ask for another option to try (within the first 30 days).  Discuss with your provider if you are having issues breathing with the machine or the temperature or humidity feel uncomfortable.  Expect to have an adjustment period when you start your device. It helps to continuing wearing the machine every day for a period of time until you get more used to it. You can practice with wearing the mask alone if you need, then add in the PAP air pressure a few days later.   Reach out for help if you are struggling! The sleep medicine department can be reached at [phone]  We encourage you to download data monitoring apps to " your phone. For Any.DO 10/11 - MyAir lani. For [place] - DreamMapper. Both are available in the Lani store for free and are a great tool to monitor your progress with your CPAP night to night.    IF YOU ARE HAVING TROUBLE GETTING USED TO YOUR PAP DEVICE    The following steps are recommended to help you get accustomed to using CPAP and improve your CPAP usage at home:    Use CPAP every night for 5 to 10 minutes while awake in the evening without fail.  Be sure to remain awake while breathing on the nasal mask for the first 1 to 2 weeks.  After 2 to 4 weeks, start using CPAP at night when you go to sleep…But be sure to take the mask off if you have not fallen asleep in 5 to 10 minutes, or if you fall asleep.  Take the mask off whenever you become aware of it or the moment you wake up.   Continue this process every night, with confidence that you will gradually become accustomed to using CPAP over time.    Be sure you are using a comfortable mask, and that you are applying it snugly (but not too tight).    Common issues with PAP machine:  Mask gets dislodged when turning to the side: Consider getting a CPAP pillow or switching to a mask with hose on top.     Dry mouth:  Your machine has built-in humidifier that heats up the air to prevent dry mouth. It can be adjusted to your comfort. You can try that first and increase setting one level one night at a time to check which setting is comfortable and effective in lessening dry mouth. If dry mouth persists despite humidity setting adjustment, may apply OTC Biotene gel over the gums at bedtime.  If Biotene gel is not effective, consider trying XEROSTOM gel from [employer].  If using nasal pillows or nasal mask, consider adding chinstrap or mouth tape to keep your mouth closed while you sleep. Also, eliminate or reduce dose of meds that can cause dry mouth if possible. Lastly, may try getting a separate room humidifier machine.    Airleaks: Please call  "DME as they may need to adjust your mask or refit you with a different kind of mask. In addition, you can ask DME for tips on getting a good mask seal and mask fit.     Difficulty tolerating the mask: Contact your DME to try a different kind of mask and/or call office to get a referral to Sleep Psychologist for CPAP desensitization. CPAP desensitization technique is a set of strategies that helps patient cope with claustrophobia and anxiety related to wearing mask. Alternatively, we can do a daytime mini-sleep study called PAP-nap trial wherein you will try on different kinds of mask and the sleep technician will try different pressure settings on CPAP and BPAP machines to see which specific pressure is tolerable and comfortable for you.     Water droplets or moisture within the hose and/or mask: This is called rain-out and it is caused by condensation of too much heated humidity on the cooler walls of the hose. If you have rain-out, turn down/decrease your humidity setting or get a heated hose. If you already have a heated hose, turn up the \"tube temperature\" of the heated hose. Alternatively, if you don't want to get a heated hose or warmer air, may wrap the CPAP hose with stockings to keep it somewhat warm. Also, you need to place the machine on the floor and lower the hose so that water won't travel upward towards your mask.     Maintaining your CPAP/BPAP device:    The humidification chamber (aka water tank or water chamber) needs to be filled with distilled water to prevent buildup of white deposits in the future. If you cannot find distilled water, you can use tap water but expect to have white deposits buildup seen after prolonged use with tap water. If you start seeing white deposits on the water chamber, you can clean it by filling it with equal parts of distilled white vinegar and water. Let the vinegar-water mixture sit for 2 hours, and then rinse it with running tap water. Clean with soap and water then " let it dry.     You should try to keep your machine clean in order to work well. Here are some tips to clean PAP supplies / accessories:    Clean the humidification chamber (aka water tank) as well as your mask and tubing at least once a week with soap and water.   Alternatively, you can fill a sink or basin with warm water and add a little mild detergent, like Ivory dish soap. Gently wipe your supplies with the soapy water to free all the oils and dirt that may have collected. Once that's done, rinse these items with clean water until the soap is gone and let them air dry. You can hang your tubing over the curtain carmen in your bathroom so that it dries.  The mask insert (part of the mask that has contact with your skin) needs to be cleaned with soap and water daily. Another option is to wipe them down with CPAP wipes or baby wipes.    You should replace your mask and tubing frequently in order to prevent bacteria buildup, machine damage, and mask seal issues. The older the mask and hose, the high likelihood that there is bacteria buildup in it especially if they are not cleaned regularly. Dirty filters damage machines because build-up of dust and contaminants can cause machine to over-heat, and in time, damage the motor of machine. Cushions lose their seal over time as most masks are made of plastic and silicone while headgear is made of neoprene. These materials will break down with age and frequent use. Here is the recommended replacement schedule for PAP supplies / accessories:    Twice a month- disposable filters and cushions for nasal mask or nasal pillows.  Once a month- cushion for full face mask  Every 3 months- mask with headgear and PAP tubing (standard or heated hose)  Every 6 months- reusable filter, water chamber, and chin strap     Other useful information:    Some people do not put water in the tank while other people prefers to put water in the tank to prevent mouth dryness. Try to experiment to  determine which is more comfortable for you.   In general, new machines have 2 years warranty on parts while health insurance allows you to have a new machine once every 5 years.     You can also go to the following EDUCATION WEBSITES for further information:   American Academy of Sleep Medicine http://sleepeducation.org  National Sleep Foundation: https://sleepfoundation.org  American Sleep Apnea Association: https://www.sleepapnea.org (for patients with sleep apnea)    Here at [facility], we wish you a restful sleep!       IMPORTANT INFORMATION     Call 911 for medical emergencies.  Our offices are generally open from Monday-Friday, 9 am - 5 pm.  If you need to get in touch with me, you may either call me/my team (number is below) or you can use KRAFTWERK.  If a referral for a test, for CPAP, or for another specialist was made, and you have not heard about scheduling this within a week, please call scheduling at [phone] ([phone]).  If you are unable to make your appointment for clinic or an overnight study, kindly call the office at least 48 hours in advance to cancel and reschedule.  If you are on CPAP, please bring your device's card or the device to each clinic appointment.   There are no supporting services by either the sleep doctors or their staff on weekends and Holidays, or after 5 PM on weekdays.     PRESCRIPTIONS     We require 7 days advanced notice for prescription refills. If we do not receive the request in this time, we cannot guarantee that your medication will be refilled in time.    IMPORTANT PHONE NUMBERS     [facility]: [phone]  [employer] ([employer]): [phone]  [employer] ([employer]): [phone]  [facility] (DME): [phone]    CONTACTING YOUR SLEEP MEDICINE PROVIDER AND SLEEP TEAM      For issues with your machine or mask interface, please call your DME provider first. [employer] stands for durable medical company. [employer] is the company who  "provides you the machine and/or PAP supplies / accessories.   To schedule, cancel, or reschedule SLEEP STUDY APPOINTMENTS, please call the Main Phone Line at [phone] ([phone] - option 3.   To schedule, cancel, or reschedule CLINIC APPOINTMENTS, you can do it in \"MyChart\", call [phone] for [facility] office to speak with my on site staff, or call the Main Phone Line at [phone] - option 2  For CLINICAL QUESTIONS or MEDICATION REFILLS, please call direct line for Adult Sleep Nurses at [phone].   Lastly, you can also send a message directly to your provider through \"My Chart\", which is the email service through your  Records Account: [email or web address]     Adult Sleep Nurses (Maryanne Gauthier, STEPHANI and Joy Buchanan RN):  For clinical questions and refilling prescriptions: [phone]  Email sleep diaries and other documents at: [email or web address]    Office locations for Bhavna Landis NP:    [facility]   [address]   [address]  [place] [address] [phone]    [facility]  [address]  [address]  [place] [address] [phone]    OUR SLEEP TESTING LOCATIONS     Our team will contact you to schedule your sleep study, however, you can contact us as follow:  Main Phone Line (scheduling only): [phone]), option 3    Sleep Testing Locations:   [place] (18 years and older): [address] [place] [address], 2nd floor  [facility] (18 years and older): [address]; 4th floor  After hours line: [phone]  [facility] (18 years and older) at [place]: [id number] [place] [address]  After hours line: [phone]   [facility] (Main campus: All ages): [facility], 6th floor. After hours line: [phone]   [place] (5 years and older; younger considered on case-by-case basis): [address]; [employer] [address], [address]. Scheduling  After hours line: [phone]       Here " at [facility], we wish you a restful sleep!    Your sleep medicine provider for this visit was: NONI Henderson-CNP

## 2025-05-09 LAB
ALDOST SERPL-MCNC: 5 NG/DL
ALDOST/RENIN PLAS-RTO: 9.3 RATIO (ref 0.9–28.9)
CORTIS AM PEAK SERPL-MCNC: 1.7 MCG/DL
DEXAMETHASONE SERPL-MCNC: 474 NG/DL
EST. AVERAGE GLUCOSE BLD GHB EST-MCNC: 151 MG/DL
EST. AVERAGE GLUCOSE BLD GHB EST-SCNC: 8.4 MMOL/L
HBA1C MFR BLD: 6.9 %
RENIN PLAS-CCNC: 0.54 NG/ML/H (ref 0.25–5.82)
TSH SERPL-ACNC: 1.61 MIU/L (ref 0.4–4.5)

## 2025-05-12 DIAGNOSIS — E11.65 TYPE 2 DIABETES MELLITUS WITH HYPERGLYCEMIA, WITHOUT LONG-TERM CURRENT USE OF INSULIN: Primary | ICD-10-CM

## 2025-05-12 DIAGNOSIS — E11.65 TYPE 2 DIABETES MELLITUS WITH HYPERGLYCEMIA, WITHOUT LONG-TERM CURRENT USE OF INSULIN: ICD-10-CM

## 2025-05-23 ENCOUNTER — PHARMACY VISIT (OUTPATIENT)
Dept: PHARMACY | Facility: CLINIC | Age: 60
End: 2025-05-23
Payer: COMMERCIAL

## 2025-05-23 PROCEDURE — RXMED WILLOW AMBULATORY MEDICATION CHARGE

## 2025-06-02 PROCEDURE — RXMED WILLOW AMBULATORY MEDICATION CHARGE

## 2025-06-03 ENCOUNTER — APPOINTMENT (OUTPATIENT)
Dept: PHARMACY | Facility: HOSPITAL | Age: 60
End: 2025-06-03
Payer: COMMERCIAL

## 2025-06-03 DIAGNOSIS — E11.65 TYPE 2 DIABETES MELLITUS WITH HYPERGLYCEMIA, WITHOUT LONG-TERM CURRENT USE OF INSULIN: ICD-10-CM

## 2025-06-03 DIAGNOSIS — I10 PRIMARY HYPERTENSION: ICD-10-CM

## 2025-06-03 PROCEDURE — RXMED WILLOW AMBULATORY MEDICATION CHARGE

## 2025-06-03 RX ORDER — BLOOD-GLUCOSE SENSOR
1 EACH MISCELLANEOUS ONCE AS NEEDED
Qty: 2 EACH | Refills: 11 | Status: SHIPPED | OUTPATIENT
Start: 2025-06-03

## 2025-06-03 RX ORDER — TIRZEPATIDE 5 MG/.5ML
5 INJECTION, SOLUTION SUBCUTANEOUS WEEKLY
Qty: 2 ML | Refills: 11 | Status: SHIPPED | OUTPATIENT
Start: 2025-06-03

## 2025-06-03 NOTE — PROGRESS NOTES
Clinical Pharmacy Appointment    Patient ID: Kuldeep Kemp is a 60 y.o. male who presents for Diabetes and Hypertension.    Pt is here for Follow Up appointment.     Referring Provider: Ronda Wilcox APRN-*  PCP: iD Denise MD   Last visit with PCP: 4/29/2025   Next visit with PCP: 6/20/2025      Subjective     Interval History  Has sleep supplies after visit in May- picking up soon  He has not started use, hoping this improves his sleep and BP  Repeat A1c remains controlled but higher than previous (6.9%)  Moringa powder: comparable nutrients are Vitamin A, B1-3, and C  Messaged patient as requested  Weight stable at high 170s; previously felt weak at lower weight so happy at this level  Recent change to insurance with new job  Recently tweaked his shoulder, hoping it resolves within the week    HPI  DIABETES MELLITUS TYPE 2:    Diagnosed with diabetes: Known diabetic complications: fatty liver.  Does patient follow with Endocrinology: No  Optometry: does believe he is due for a visit  Podiatry: patient denies sores or cuts on feet today      Current diabetic medications include:  Mounjaro 5 mg weekly    Clarifications to above regimen: takes Sundays  Adverse Effects: no concerns     Past diabetic medications include:  Metformin     Glucose Readings:  Glucometer/CGM Type: CGM, not frequent use    Current home BG readings (mg/dL): still controlled when he does check, would like new monitor script today  Last visit: almost 100% in range - has not worn new sensor lately - maintains around 120 mg/dL    Any episodes of hypoglycemia? Yes, occasionally wakes to alarm for low- no symptoms and seems to correct itself, likely due to pressure on sensor.  Did patient treat episode of hypoglycemia appropriately? N/A  Does the patient have a prescription for ready-to-use Glucagon? Not on insulin  Does pt have proteinuria? Not at this time    Lifestyle:  No major changes since last visit- will be starting CPAP  supplies soon    4/2024:  Has noted a couple pounds of weight loss since increasing his Mounjaro dose  No significant lifestyle changes reported  Looking into [employer] and stem cell healing of joints  Carpal tunnel- hand numb while holding phone    2/2025:  Jacqueline often falls off early when he does wear them  Eating out a lot recently due to being busy, frequent business trip    1/17:  Exercise: trying to improve, in warmer weather likes riding bicycle    Hx:  Diet:  Does avoid processed foods and canned food, not much bread  BK: breakfast sandwich today- first time in a month; connolly once per week  Snacks: very rarely chips or pretzels, tries to avoid  Drinks: water, coffee    Secondary Prevention:  Statin? No  ACE-I/ARB? Yes  Aspirin? Yes    Pertinent PMH Review:  PMH of Pancreatitis: No  PMH of Retinopathy: No  PMH of Urinary Tract Infections: No  PMH of MTC/MEN2: No    Immunizations:  Influenza? No  COVID? Yes  Pneumonia? Yes  Shingles? No  Hepatitis B? No     HYPERTENSION ASSESSMENT  Medication Therapy  Current Medications:   Amlodipine 10/olmesartan 40 mg daily  Chlorthalidone 25 mg daily  Previous Medications: amlodipine 10 mg, hydrochlorothiazide 25 mg, lisinopril (cough)      Clarifications to above regimen: no repeat labs since medication adjustment  Adverse Effects: no concerns     Home BP Monitoring  BP Cuff Type: Wrist monitor  Cuff Validated? Yes, not very accurate wrist monitor- about 20 mmHg below true readings    Current home BP readings: last check 138 systolic  Previous home BP readings: improved to about 130/70 on home monitor    BP Readings from Last 3 Encounters:   05/07/25 145/74   04/29/25 148/85   04/25/25 170/82     Lifestyle  See above    Sodium Intake:  caffeine intake: 2 cups of coffee in the morning; cut back from 3 pots of coffee; very little added salt per the patient- avoids processed foods    Risk Assessment  Major Risk Factors Include:  Hypertension  Tobacco use  Diabetes  mellitus  Age > 55 (men) or > 65 (women)  The 10-year ASCVD risk score (Eleuterio KO, et al., 2019) is: 29.7%    Values used to calculate the score:      Age: 60 years      Sex: Male      Is Non- : No      Diabetic: Yes      Tobacco smoker: No      Systolic Blood Pressure: 145 mmHg      Is BP treated: Yes      HDL Cholesterol: 33.3 mg/dL      Total Cholesterol: 188 mg/dL  Known target organ damage:  None    Secondary Prevention  On Statin?: No, previous rosuvastatin  Immunizations Needed: Annual Flu and Shingrix  Tobacco Use: vaping, occasional cigars     Medication Reconciliation:  Changed: no reported changes    Drug Interactions  The following drug interactions were noted:    Olmesartan use on potassium supplement- monitor potassium levels    Medication System Management  Patient's preferred pharmacy: [facility]  Adherence/Organization: reports taking medications as directed  Affordability/Accessibility: no concerns      Objective   Allergies   Allergen Reactions    Animal Dander Unknown    Cat Dander Unknown    Lisinopril Cough     Social History     Social History Narrative    Not on file      Medication Review  Current Outpatient Medications   Medication Instructions    amlodipine-olmesartan (Fernando) 10-40 mg tablet 1 tablet, oral, Daily    ascorbic acid (VITAMIN C) 500 mg, Daily    aspirin 81 mg, Daily    blood-glucose sensor (FreeStyle Jacqueline 3 Plus Sensor) device Apply sensor once every 15 days to monitor blood glucose    chlorthalidone (HYGROTON) 25 mg, oral, Daily    cholecalciferol (VITAMIN D-3) 25 mcg, Daily    dexAMETHasone (DECADRON) 1 mg, oral, Once, Take at 11pm the night before the cortisol lab test    flash glucose sensor kit (FreeStyle Jacqueline 2 Sensor) kit APPLY 1 SENSOR TO THE BACK OF THE ARM EVERY 14 DAYS TO MONITOR BLOOD SUGAR    glucosamine-chondroitin 500-400 mg tablet 1 tablet, 3 times daily    milk thistle 175 mg, Daily    Mounjaro 5 mg, subcutaneous, Weekly    NON  "FORMULARY Moringa    potassium chloride CR (Klor-Con) 10 mEq ER tablet 10 mEq, oral, Daily, Do not crush, chew, or split.    triamcinolone (Kenalog) 0.1 % cream Apply to affected area 1-2 times daily as needed. Avoid face and groin.    TURMERIC ORAL 1 tablet, Daily      Vitals  BP Readings from Last 2 Encounters:   05/07/25 145/74   04/29/25 148/85     BMI Readings from Last 1 Encounters:   05/07/25 29.86 kg/m²      Labs  A1C  Lab Results   Component Value Date    HGBA1C 6.9 (H) 04/30/2025    HGBA1C 5.7 (H) 07/29/2024    HGBA1C 5.6 11/27/2023     BMP  Lab Results   Component Value Date    CALCIUM 9.2 07/29/2024     07/29/2024    K 3.4 (L) 07/29/2024    CO2 28 07/29/2024     07/29/2024    BUN 15 07/29/2024    CREATININE 0.73 07/29/2024    EGFR >90 07/29/2024     LFTs  Lab Results   Component Value Date    ALT 19 07/29/2024    AST 21 07/29/2024    ALKPHOS 50 07/29/2024    BILITOT 0.7 07/29/2024     FLP  Lab Results   Component Value Date    TRIG 271 (H) 07/29/2024    CHOL 188 07/29/2024    LDLF 165 (H) 02/15/2023    LDLCALC 101 (H) 07/29/2024    HDL 33.3 07/29/2024     Urine Microalbumin  No results found for: \"MICROALBCREA\"  Weight Management  Wt Readings from Last 3 Encounters:   05/07/25 83.9 kg (185 lb)   04/29/25 81.9 kg (180 lb 9.6 oz)   04/25/25 81.6 kg (180 lb)      There is no height or weight on file to calculate BMI.     Assessment/Plan   Problem List Items Addressed This Visit       Hypertension    ASSESSMENT OF SMBP MEASUREMENTS  Last reading 138/70s    Patient's goal BP < 130/80.   Rationale for plan: The patient feels well without headache or dizziness. He has seen some improvements in pain until this week he tweaked his shoulder. Resistance labs negative. He has seen improved BP into 130s at home, although home monitor HAS NOT been accurate in past versus office monitor. He has not yet started CPAP supply use which he hopes will further improve BP. Encouraged to start soon and we will " follow-up in August to assess if this shows improvement in BP.  CrCl cannot be calculated (Patient's most recent lab result is older than the maximum 3 days allowed.).    Medication Changes:  CONTINUE  Chlorthalidone 25 mg daily  Fernando 10-40 mg daily (maximum dose)    Future Considerations:  Consider adding spironolactone if uncontrolled next visit after CPAP use    Monitoring and Education Discussed:  Eat right: Your diet should be rich in fruits, vegetables, potassium, and low-fat dairy products. You should also reduce your intake of sodium and fats, particularly saturated fats.  Maintain a healthy weight: Try to achieve and maintain a healthy weight. If you are unsure what this means for you, please contact our clinic.  Exercise: Try to get at least 30 minutes of aerobic exercise every day.  Moderate your alcohol consumption: Limit your alcohol intake to one drink per day.  Monitor your blood pressure: You should check your blood pressure regularly. Notify our clinic if your blood pressure readings are consistently higher than recommended.          Relevant Orders    Referral to Clinical Pharmacy    Type 2 diabetes mellitus with hyperglycemia (Multi)    Patient's goal A1c is < 7%.  Is pt at goal? Yes, 6.9% (although higher than last test)  Patient's SMBGs are controlled although he has not tested often. Requests new CGM sensors today.     Rationale for plan: Kuldeep remains controlled in blood glucose although A1c higher than last check. He would like a new sensor to verify he is still controlled at home. He tolerates current Mounjaro well and is happy with his current weight in the high 170 lb range. Given he tolerates well, sent new sensors to verify this is still controlling glucose adequately. No changes needed to therapy today.    Medication Changes:  CONTINUE  Mounjaro 5 mg weekly    Future Considerations:  Consider dose increase if readings increase on next Jacqueline sensor           Relevant Medications     tirzepatide (Mounjaro) 5 mg/0.5 mL pen injector    blood-glucose sensor (FreeStyle Jacqueline 3 Plus Sensor) device    Other Relevant Orders    Referral to Clinical Pharmacy     Clinical Pharmacist follow-up: 8/5 8:40AM, Telehealth visit  The patient is not a chronic care management patient.    Continue all meds under the continuation of care with the referring provider and clinical pharmacy team.    Thank you,  Olimpia Bajwa, PharmD  Clinical Pharmacist  [phone]    Verbal consent to manage patient's drug therapy was obtained from the patient. They were informed they may decline to participate or withdraw from participation in pharmacy services at any time.

## 2025-06-03 NOTE — ASSESSMENT & PLAN NOTE
ASSESSMENT OF SMBP MEASUREMENTS  Last reading 138/70s    Patient's goal BP < 130/80.   Rationale for plan: The patient feels well without headache or dizziness. He has seen some improvements in pain until this week he tweaked his shoulder. Resistance labs negative. He has seen improved BP into 130s at home, although home monitor HAS NOT been accurate in past versus office monitor. He has not yet started CPAP supply use which he hopes will further improve BP. Encouraged to start soon and we will follow-up in August to assess if this shows improvement in BP.  CrCl cannot be calculated (Patient's most recent lab result is older than the maximum 3 days allowed.).    Medication Changes:  CONTINUE  Chlorthalidone 25 mg daily  Fernando 10-40 mg daily (maximum dose)    Future Considerations:  Consider adding spironolactone if uncontrolled next visit after CPAP use    Monitoring and Education Discussed:  Eat right: Your diet should be rich in fruits, vegetables, potassium, and low-fat dairy products. You should also reduce your intake of sodium and fats, particularly saturated fats.  Maintain a healthy weight: Try to achieve and maintain a healthy weight. If you are unsure what this means for you, please contact our clinic.  Exercise: Try to get at least 30 minutes of aerobic exercise every day.  Moderate your alcohol consumption: Limit your alcohol intake to one drink per day.  Monitor your blood pressure: You should check your blood pressure regularly. Notify our clinic if your blood pressure readings are consistently higher than recommended.

## 2025-06-03 NOTE — ASSESSMENT & PLAN NOTE
Patient's goal A1c is < 7%.  Is pt at goal? Yes, 6.9% (although higher than last test)  Patient's SMBGs are controlled although he has not tested often. Requests new CGM sensors today.     Rationale for plan: Kuldeep remains controlled in blood glucose although A1c higher than last check. He would like a new sensor to verify he is still controlled at home. He tolerates current Mounjaro well and is happy with his current weight in the high 170 lb range. Given he tolerates well, sent new sensors to verify this is still controlling glucose adequately. No changes needed to therapy today.    Medication Changes:  CONTINUE  Mounjaro 5 mg weekly    Future Considerations:  Consider dose increase if readings increase on next Jacqueline sensor

## 2025-06-04 ENCOUNTER — PHARMACY VISIT (OUTPATIENT)
Dept: PHARMACY | Facility: CLINIC | Age: 60
End: 2025-06-04
Payer: COMMERCIAL

## 2025-06-06 ENCOUNTER — PHARMACY VISIT (OUTPATIENT)
Dept: PHARMACY | Facility: CLINIC | Age: 60
End: 2025-06-06
Payer: COMMERCIAL

## 2025-06-17 PROCEDURE — RXMED WILLOW AMBULATORY MEDICATION CHARGE

## 2025-06-20 ENCOUNTER — APPOINTMENT (OUTPATIENT)
Dept: PRIMARY CARE | Facility: CLINIC | Age: 60
End: 2025-06-20
Payer: COMMERCIAL

## 2025-06-23 ENCOUNTER — PHARMACY VISIT (OUTPATIENT)
Dept: PHARMACY | Facility: CLINIC | Age: 60
End: 2025-06-23
Payer: COMMERCIAL

## 2025-06-24 ENCOUNTER — APPOINTMENT (OUTPATIENT)
Dept: INTEGRATIVE MEDICINE | Facility: CLINIC | Age: 60
End: 2025-06-24
Payer: COMMERCIAL

## 2025-06-24 VITALS
HEART RATE: 87 BPM | DIASTOLIC BLOOD PRESSURE: 62 MMHG | BODY MASS INDEX: 28.12 KG/M2 | WEIGHT: 175 LBS | SYSTOLIC BLOOD PRESSURE: 122 MMHG | HEIGHT: 66 IN

## 2025-06-24 DIAGNOSIS — R19.7 DIARRHEA, UNSPECIFIED TYPE: ICD-10-CM

## 2025-06-24 DIAGNOSIS — M19.90 ARTHRITIS: Primary | ICD-10-CM

## 2025-06-24 DIAGNOSIS — M25.511 CHRONIC RIGHT SHOULDER PAIN: ICD-10-CM

## 2025-06-24 DIAGNOSIS — Z78.9 TAKES DIETARY SUPPLEMENTS: ICD-10-CM

## 2025-06-24 DIAGNOSIS — G89.29 CHRONIC PAIN OF BOTH ANKLES: ICD-10-CM

## 2025-06-24 DIAGNOSIS — M25.572 CHRONIC PAIN OF BOTH ANKLES: ICD-10-CM

## 2025-06-24 DIAGNOSIS — R79.89 LOW SERUM CORTISOL LEVEL: ICD-10-CM

## 2025-06-24 DIAGNOSIS — M25.571 CHRONIC PAIN OF BOTH ANKLES: ICD-10-CM

## 2025-06-24 DIAGNOSIS — G89.29 CHRONIC RIGHT SHOULDER PAIN: ICD-10-CM

## 2025-06-24 PROCEDURE — 99417 PROLNG OP E/M EACH 15 MIN: CPT | Performed by: INTERNAL MEDICINE

## 2025-06-24 PROCEDURE — 99215 OFFICE O/P EST HI 40 MIN: CPT | Performed by: INTERNAL MEDICINE

## 2025-06-24 ASSESSMENT — ENCOUNTER SYMPTOMS
DIARRHEA: 1
SLEEP DISTURBANCE: 1
NERVOUS/ANXIOUS: 0
ABDOMINAL PAIN: 0
RECTAL PAIN: 0
CONSTIPATION: 0
FATIGUE: 1
ARTHRALGIAS: 1
ABDOMINAL DISTENTION: 0

## 2025-06-24 NOTE — PATIENT INSTRUCTIONS
Increase the fiber in your diet. Get to consuming 5 servings of fruits and vegetables daily and at least 1 servings of beans or lentils per day and 3 servings of whole grains.   Please get the vitamin b12 level that was ordered by provider nohelia.   Get your other labs that I ordered.   Move your diet to a more plant based version.   Eat a large salad every day.   Limit alcohol to two drinks per week instead of many per week. Will help to lower inflammation in the body.   Www.[email or web address] by Dr. Kuldeep Gutiérrez

## 2025-06-24 NOTE — PROGRESS NOTES
"Integrative Medicine Visit:     Subjective   Patient ID: Kuldeep Kemp is a 60 y.o. male who presents for pain shoulders/ thumb/ ankles        HPI  He is in pain all the time, ankles, shoulder, thumb and carpal tunnel pain. Had PRP to his right shoulder. 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 Infection with second replacement- apparently first infeciton never went away. He had kidney and liver failure and took infectious disesea specialist to help him.  On IV antibiotics for 3 months 2012. Third shoulder replaced in 2017.l used to donate blood regularly and was told he could not because his iron levels were too low.      Psoriasis: used to take topical steroids for this but not anymore. Not on oral steroids except maybe once or twice a most.   Feels lost of strength and power and has trouble with energy. Is able to get through this day but feels weak.     CONCERNS:  wants help with his joint pain .   No results found for: \"FERRITIN\"   No results found for: \"IRON\", \"TIBC\", \"FERRITIN\"   PMH:  psoriatic and rheumatoid arthritis.   Not sure if he has celiac.   Diabetes- on mounjaro. Has lost weight on this.   Eczema.   [facility]:  daughter arvind has some liver problems. Infertility.     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 Lives with his wife. Grown children.     NUTRITION: breakfast: breakfast bar- [employer] bar with coffee black   Dinner: sushi- 3 piece of salmon, tuna on rice and 4 additional pieces of sushi. Did not drink anything. Leftover green beans and cucumber. Couple radishes.   Lunch: two cans of sardine and two packets of tuna. No fruit or vegetable. Water.   Green tea at night most days.     Smoking:  not currently but hx of smoking cigarettes and marijuana.     Alcohol use:  drinks 6-20 drinks per week. Other times none. No one is concerned but he has had 20 drinks ins 24 hour period. Never blacks out he says used to drink heavily for entertainment " "when he went to [place]; has a friend who is a high functioning alcholic.     Exercise:  irregularly no energy to exercise    SLEEP: snores a lot. He gets on average 5.5 hours per night. Does not get up at night. Goes to bed around 11 am wakes around 5:30 am. No trouble falling asleep. Wakes once per night.  Had sleep study that showed sleep apnea.     STRESS MANAGEMENT: also  uses marijuana edibles for pain and relaxation. Also helps his sleep.   SUPPORT: wife.     Review of Systems   Constitutional:  Positive for fatigue.   Gastrointestinal:  Positive for diarrhea. Negative for abdominal distention, abdominal pain, constipation and rectal pain.   Musculoskeletal:  Positive for arthralgias.   Skin:  Positive for rash (psoriasis).   Psychiatric/Behavioral:  Positive for sleep disturbance. Negative for suicidal ideas. The patient is not nervous/anxious.             Pain:    Objective   /62 (BP Location: Left arm, Patient Position: Sitting, BP Cuff Size: Adult)   Pulse 87   Ht 1.676 m (5' 6\")   Wt 79.4 kg (175 lb)   BMI 28.25 kg/m²       Physical Exam  HENT:      Head: Normocephalic and atraumatic.      Mouth/Throat:      Mouth: Mucous membranes are moist.   Cardiovascular:      Rate and Rhythm: Normal rate.   Pulmonary:      Effort: Pulmonary effort is normal. No respiratory distress.   Musculoskeletal:         General: No swelling or deformity.      Cervical back: Normal range of motion.   Skin:     General: Skin is dry.      Findings: Rash present.   Neurological:      General: No focal deficit present.      Mental Status: He is alert and oriented to person, place, and time.   Psychiatric:      Comments: Normal affect       Rash- face very charito, few spider agiomas on face.                Assessment/Plan     Problem List Items Addressed This Visit           ICD-10-CM    Arthritis - Primary M19.90    Relevant Orders    Celiac Panel    Iron and TIBC    Ferritin    CBC and Auto Differential     Other " Visit Diagnoses         Codes      Takes dietary supplements     Z78.9    he would like c/s with integrative med       Diarrhea, unspecified type     R19.7    Relevant Orders    Celiac Panel    Iron and TIBC    Ferritin    CBC and Auto Differential      Chronic right shoulder pain     M25.511, G89.29    Relevant Orders    CBC and Auto Differential      Chronic pain of both ankles     M25.571, G89.29, M25.572    Relevant Orders    Referral to [facility]      Low serum cortisol level     R79.89    Relevant Orders    Cortisol          Low cortisol is normal in setting of taking dexamethasone being taken the night before. However, pt does have some symptoms of adrenal failure so will screen with fasting cortisol.   I am concerned that he is consuming too much alcohol and this could be worsening his pain. Discussed this with patient.   Increase the fiber in your diet. Get to consuming 5 servings of fruits and vegetables daily and at least 1 servings of beans or lentils per day and 3 servings of whole grains.   Please get the vitamin b12 level that was ordered by provider nohelia.   Get your other labs that I ordered.   Move your diet to a more plant based version.   Eat a large salad every day.   Limit alcohol to two drinks per week instead of many per week. Will help to lower inflammation in the body.   Www.nutritionfacts.org by Dr. Kuldeep Gutiérrez  Recommend Follow up in : 3 months.     Christina Chaudhry MD PhD    Time Spent  Prep time on day of patient encounter: 5 minutes  Time spent directly with patient, family or caregiver: 55 minutes  Additional Time Spent on Patient Care Activities: 0 minutes  Documentation Time: 0 minutes  Other Time Spent: 0 minutes  Total: 60 minutes

## 2025-07-01 ENCOUNTER — PHARMACY VISIT (OUTPATIENT)
Dept: PHARMACY | Facility: CLINIC | Age: 60
End: 2025-07-01
Payer: COMMERCIAL

## 2025-07-01 PROCEDURE — RXMED WILLOW AMBULATORY MEDICATION CHARGE

## 2025-07-02 LAB
ALBUMIN/CREAT UR: 12 MG/G CREAT
CORTIS SERPL-MCNC: 12.5 MCG/DL
CREAT UR-MCNC: 145 MG/DL (ref 20–320)
MICROALBUMIN UR-MCNC: 1.8 MG/DL

## 2025-07-03 LAB
BASOPHILS # BLD AUTO: 61 CELLS/UL (ref 0–200)
BASOPHILS NFR BLD AUTO: 1.3 %
EOSINOPHIL # BLD AUTO: 221 CELLS/UL (ref 15–500)
EOSINOPHIL NFR BLD AUTO: 4.7 %
ERYTHROCYTE [DISTWIDTH] IN BLOOD BY AUTOMATED COUNT: 13.6 % (ref 11–15)
FERRITIN SERPL-MCNC: 52 NG/ML (ref 24–380)
GLIADIN IGA SER IA-ACNC: <1 U/ML
GLIADIN IGG SER IA-ACNC: <1 U/ML
HCT VFR BLD AUTO: 43.2 % (ref 38.5–50)
HGB BLD-MCNC: 13.9 G/DL (ref 13.2–17.1)
IGA SERPL-MCNC: 188 MG/DL (ref 70–320)
IRON SATN MFR SERPL: 14 % (CALC) (ref 20–48)
IRON SERPL-MCNC: 50 MCG/DL (ref 50–180)
LYMPHOCYTES # BLD AUTO: 1363 CELLS/UL (ref 850–3900)
LYMPHOCYTES NFR BLD AUTO: 29 %
MCH RBC QN AUTO: 30.1 PG (ref 27–33)
MCHC RBC AUTO-ENTMCNC: 32.2 G/DL (ref 32–36)
MCV RBC AUTO: 93.5 FL (ref 80–100)
MONOCYTES # BLD AUTO: 301 CELLS/UL (ref 200–950)
MONOCYTES NFR BLD AUTO: 6.4 %
NEUTROPHILS # BLD AUTO: 2754 CELLS/UL (ref 1500–7800)
NEUTROPHILS NFR BLD AUTO: 58.6 %
PLATELET # BLD AUTO: 295 THOUSAND/UL (ref 140–400)
PMV BLD REES-ECKER: 8.5 FL (ref 7.5–12.5)
RBC # BLD AUTO: 4.62 MILLION/UL (ref 4.2–5.8)
TIBC SERPL-MCNC: 351 MCG/DL (CALC) (ref 250–425)
TTG IGA SER-ACNC: <1 U/ML
TTG IGG SER-ACNC: <1 U/ML
WBC # BLD AUTO: 4.7 THOUSAND/UL (ref 3.8–10.8)

## 2025-07-07 DIAGNOSIS — E61.1 IRON DEFICIENCY: Primary | ICD-10-CM

## 2025-07-07 DIAGNOSIS — K22.719 BARRETT'S ESOPHAGUS WITH DYSPLASIA: ICD-10-CM

## 2025-07-07 NOTE — PROGRESS NOTES
Referral to GI for consideration of upper endoscopy. Has very slightly low iron with colonosocopy in 2024 not suggestive of chronic bleeding. EGD in 2014 done that on gross inspection looked liked velásquez;s. Lost to follow up.  Pt drinks up to 20 alcoholic beverages per week.   Christina Chaudhry MD PhD

## 2025-07-12 PROCEDURE — RXMED WILLOW AMBULATORY MEDICATION CHARGE

## 2025-07-12 NOTE — PROGRESS NOTES
PCP: Di Denise MD   Referred:     Kuldeep Kemp is a 60 y.o. male with PMH of HTN, DLD, and DMT2, presenting with c/f UGIB and iron loss.    History Of Present Illness:    He/She additionally denies syncope/LOC, chest pain, dyspnea, cough, abdominal pain, dysphagia, nausea, vomiting, constipation, melena, hematochezia, dysuria, hematuria, lower extremity swelling, rash, fevers, chills, sick contacts, weight loss, or travel.     12-point ROS was reviewed and is otherwise negative.    Pertinent Procedures:  8/2024 screening colonoscopy:  Findings  One 5 mm sessile polyp in the cecum; performed cold snare removal  One 4 mm sessile polyp in the ascending colon; performed cold forceps biopsy  Internal small hemorrhoids    Recommendation     Repeat colonoscopy in 5 years, due: 8/20/2029     A. COLON, CECUM, POLYP:   --  FRAGMENTS OF TUBULAR ADENOMA.     B. COLON, ASCENDING, POLYP:   --  TUBULAR ADENOMA.    12/2014 screening colonoscopy:  Impression:     - Hemorrhoids found on perianal exam.                         - The entire examined colon is normal on direct and retroflexion views.  Recommendation:                                  - Repeat colonoscopy in 10 years for screening purposes.    12/2014 EGD for heartburn, dysphagia:  Impression:     - Esophageal mucosal changes suspicious for Giron's esophagus. Biopsied.                         - Gastritis. Biopsied.                         - A single duodenal polyp. Biopsied.    A.  DUODENUM POLYP, BIOPSIES: PORTIONS OF SMALL INTESTINAL MUCOSA WITH BRUNNER GLAND HYPERPLASIA AND PYLORIC GLAND METAPLASIA, SEE NOTE.  Note: These findings most likely represent changes secondary to high acid  output or nonsteroidal anti-inflammatory drug use.  There is no evidence of neoplasia.    B. STOMACH, BIOPSIES: PORTIONS OF GASTRIC MUCOSA WITH FEATURES CONSISTENT WITH SITE OF HEALING ULCER/EROSION, SEE NOTE.  Note: No microorganisms are identified.    C. GASTROESOPHAGEAL  JUNCTION, BIOPSIES: SQUAMOCOLUMNAR JUNCTION MUCOSA WITH CHRONIC CARDITIS AND FOCAL INTESTINAL METAPLASIA, SEE NOTE.  Note: Underlying etiologies to be considered for the inflammatory changes noted  include reflux effect and nonsteroidal anti-inflammatory drug use.  The presence of intestinal metaplasia at the gastroesophageal junction is not  necessarily synonymous with Giron's esophagus.  There is no evidence of dysplasia.     Family History:  ***    Social History:  -Lives with ***  -Occupation: ***  -EtOH: ***  -Recreational drugs: ***  -Tobacco: ***    Past Medical History:  He has a past medical history of Essential (primary) hypertension (06/14/2017), Personal history of other diseases of the digestive system, Pyogenic arthritis, unspecified (04/17/2017), Snoring, Type 2 diabetes mellitus without complications (06/14/2022), and Unspecified osteoarthritis, unspecified site (06/15/2017).    Home Medications:  Current Outpatient Medications   Medication Instructions    amlodipine-olmesartan (Fernando) 10-40 mg tablet 1 tablet, oral, Daily    ascorbic acid (VITAMIN C) 500 mg, Daily    aspirin 81 mg, Daily    blood-glucose sensor (FreeStyle Jacqueline 3 Plus Sensor) device Apply sensor once every 15 days to monitor blood glucose    chlorthalidone (HYGROTON) 25 mg, oral, Daily    cholecalciferol (VITAMIN D-3) 25 mcg, Daily    dexAMETHasone (DECADRON) 1 mg, oral, Once, Take at 11pm the night before the cortisol lab test    flash glucose sensor kit (FreeStyle Jacqueline 2 Sensor) kit APPLY 1 SENSOR TO THE BACK OF THE ARM EVERY 14 DAYS TO MONITOR BLOOD SUGAR    glucosamine-chondroitin 500-400 mg tablet 1 tablet, 3 times daily    milk thistle 175 mg, Daily    Mounjaro 5 mg, subcutaneous, Weekly    NON FORMULARY [place]    NON FORMULARY Beet root      potassium chloride CR (Klor-Con) 10 mEq ER tablet 10 mEq, oral, Daily, Do not crush, chew, or split.    triamcinolone (Kenalog) 0.1 % cream Apply to affected area 1-2 times daily as  needed. Avoid face and groin.    TURMERIC ORAL 1 tablet, Daily        Surgical History:  He has a past surgical history that includes Rotator cuff repair (02/12/2014); Total shoulder arthroplasty (02/12/2014); XR shoulder (Left); and XR shoulder (Left).     Social History:  He reports that he quit smoking about 2 years ago. His smoking use included cigarettes. He has been exposed to tobacco smoke. He has never used smokeless tobacco. He reports current alcohol use of about 6.0 standard drinks of alcohol per week. He reports current drug use. Drug: Marijuana.    Family History:  Family History[1]     Allergies:  Animal dander, Cat dander, and Lisinopril    OBJECTIVES:  Vital Signs:  There were no vitals taken for this visit.    Physical Exam: ***  General: Patient is in NAD.  Neuro: A and O x 3, CN 1-12 grossly intact, no asterixis.  HEENT: PERRLA, MMM, sclera anicteric.  CV: RRR, no m/r/g.  Pulm: CTA BL, no crackles, or wheezes.  Abd: Soft, NBS in 4q, NTTP, no rebound or guarding, no hepatosplenomegaly.  Ext: Warm and well-perfused.  Rectal: No visible blood, hemorrhoids, or external masses. Rectal tone WNL. JUAN C reveals melena with no masses.    Psych: Appropriate mood and behavior.    LABS:  7/1/2025 CBC/ferritin/celiac panel unremarkable    Assessment/Plan     ***    RTC in *** months.    Antithrombotics:  Infection:  Additional Barriers to endoscopy:    Argelia Kumar MD       [1]   Family History  Adopted: Yes   Problem Relation Name Age of Onset    Other (cardiac disorder) Mother          unknown    Other (malignant neoplasm of stomach) Mother      No Known Problems Father          unknown      Mother Bio mom

## 2025-07-14 ENCOUNTER — HOSPITAL ENCOUNTER (OUTPATIENT)
Dept: RADIOLOGY | Facility: CLINIC | Age: 60
Discharge: HOME | End: 2025-07-14
Payer: COMMERCIAL

## 2025-07-14 DIAGNOSIS — M25.572 CHRONIC PAIN OF BOTH ANKLES: ICD-10-CM

## 2025-07-14 DIAGNOSIS — M25.571 CHRONIC PAIN OF BOTH ANKLES: ICD-10-CM

## 2025-07-14 DIAGNOSIS — G89.29 CHRONIC PAIN OF BOTH ANKLES: ICD-10-CM

## 2025-07-14 PROCEDURE — 73610 X-RAY EXAM OF ANKLE: CPT | Mod: BILATERAL PROCEDURE | Performed by: RADIOLOGY

## 2025-07-14 PROCEDURE — 73610 X-RAY EXAM OF ANKLE: CPT | Mod: 50

## 2025-07-16 ENCOUNTER — RESULTS FOLLOW-UP (OUTPATIENT)
Dept: PRIMARY CARE | Facility: CLINIC | Age: 60
End: 2025-07-16
Payer: COMMERCIAL

## 2025-07-16 DIAGNOSIS — M25.572 CHRONIC PAIN OF BOTH ANKLES: ICD-10-CM

## 2025-07-16 DIAGNOSIS — G89.29 CHRONIC PAIN OF BOTH ANKLES: ICD-10-CM

## 2025-07-16 DIAGNOSIS — M25.571 CHRONIC PAIN OF BOTH ANKLES: ICD-10-CM

## 2025-07-17 ENCOUNTER — APPOINTMENT (OUTPATIENT)
Dept: GASTROENTEROLOGY | Facility: HOSPITAL | Age: 60
End: 2025-07-17
Payer: COMMERCIAL

## 2025-07-17 VITALS
WEIGHT: 180 LBS | SYSTOLIC BLOOD PRESSURE: 147 MMHG | TEMPERATURE: 97.9 F | HEART RATE: 74 BPM | DIASTOLIC BLOOD PRESSURE: 81 MMHG | BODY MASS INDEX: 29.05 KG/M2 | OXYGEN SATURATION: 96 %

## 2025-07-17 DIAGNOSIS — D50.9 IRON DEFICIENCY ANEMIA, UNSPECIFIED IRON DEFICIENCY ANEMIA TYPE: Primary | ICD-10-CM

## 2025-07-17 PROCEDURE — 99212 OFFICE O/P EST SF 10 MIN: CPT | Performed by: STUDENT IN AN ORGANIZED HEALTH CARE EDUCATION/TRAINING PROGRAM

## 2025-07-17 ASSESSMENT — ENCOUNTER SYMPTOMS
LOSS OF SENSATION IN FEET: 0
DEPRESSION: 0
OCCASIONAL FEELINGS OF UNSTEADINESS: 0

## 2025-07-17 ASSESSMENT — PAIN SCALES - GENERAL: PAINLEVEL_OUTOF10: 0-NO PAIN

## 2025-07-17 NOTE — PATIENT INSTRUCTIONS
Dear  Viridiana,    It was a pleasure meeting you in clinic today.    At this time you are not anemic. Please talk to your referring provider about next steps for the ankle pain.    Please return to clinic as needed.    Best regards,    Argelia Kumar MD  Division of Gastroenterology and Liver Disease  [address] [place] [address] [id number]  [place] [address]  Appt. Phone   Inquiries   Fax  or

## 2025-07-18 ENCOUNTER — PHARMACY VISIT (OUTPATIENT)
Dept: PHARMACY | Facility: CLINIC | Age: 60
End: 2025-07-18
Payer: COMMERCIAL

## 2025-07-21 PROCEDURE — RXMED WILLOW AMBULATORY MEDICATION CHARGE

## 2025-08-02 ENCOUNTER — PHARMACY VISIT (OUTPATIENT)
Dept: PHARMACY | Facility: CLINIC | Age: 60
End: 2025-08-02
Payer: COMMERCIAL

## 2025-08-05 ENCOUNTER — APPOINTMENT (OUTPATIENT)
Dept: PHARMACY | Facility: HOSPITAL | Age: 60
End: 2025-08-05
Payer: COMMERCIAL

## 2025-08-05 DIAGNOSIS — E11.65 TYPE 2 DIABETES MELLITUS WITH HYPERGLYCEMIA, WITHOUT LONG-TERM CURRENT USE OF INSULIN: Primary | ICD-10-CM

## 2025-08-05 DIAGNOSIS — I10 PRIMARY HYPERTENSION: ICD-10-CM

## 2025-08-05 PROCEDURE — RXMED WILLOW AMBULATORY MEDICATION CHARGE

## 2025-08-05 RX ORDER — TIRZEPATIDE 2.5 MG/.5ML
2.5 INJECTION, SOLUTION SUBCUTANEOUS WEEKLY
Qty: 2 ML | Refills: 11 | Status: SHIPPED | OUTPATIENT
Start: 2025-08-05

## 2025-08-05 NOTE — ASSESSMENT & PLAN NOTE
Improving control since starting CPAP, more 120s mmHg versus previous 130s mmHg. He does enjoy using CPAP and feels he gets better sleep as well. Given readings are improved to at or near BP goal, no changes to current medications.    CONTINUE:  Fernando 10/40 mg daily  Chlorthalidone 25 mg daily

## 2025-08-05 NOTE — PROGRESS NOTES
Clinical Pharmacy Appointment    Patient ID: Kuldeep Kemp is a 60 y.o. male who presents for Diabetes and Hypertension.    Pt is here for Follow Up appointment.     Referring Provider: Ronda Wilcox APRN-*  PCP: Di Denise MD   Last visit with PCP: 4/29/2025   Next visit with PCP: 12/30/2025      Subjective     Interval History  Able to start CPAP supplies- has helped him get up less throughout the night, he is liking the CPAP  Recently wife in hospital, week in [facility] with fairly poor care, went to [facility] and much better care there    HPI  DIABETES MELLITUS TYPE 2:    Diagnosed with diabetes: Known diabetic complications: fatty liver.  Does patient follow with Endocrinology: No  Optometry: does believe he is due for a visit  Podiatry: patient denies sores or cuts on feet today      Current diabetic medications include:  Mounjaro 5 mg weekly    Clarifications to above regimen: takes Sundays  Adverse Effects: no concerns     Past diabetic medications include:  Metformin     Glucose Readings:  Glucometer/CGM Type: CGM, not frequent use    Current home BG readings (mg/dL): 99% time in range; between high 60s-150 on summary, 1 low event, average glucose 106  Last visit: almost 100% in range - has not worn new sensor lately - maintains around 120 mg/dL    Any episodes of hypoglycemia? Yes, occasionally wakes to alarm for low- no symptoms and seems to correct itself, likely due to pressure on sensor.  Did patient treat episode of hypoglycemia appropriately? N/A  Does the patient have a prescription for ready-to-use Glucagon? Not on insulin  Does pt have proteinuria? Not at this time    Lifestyle:  Confirms starting CPAP supplies    4/2024:  Has noted a couple pounds of weight loss since increasing his Mounjaro dose  No significant lifestyle changes reported  Looking into [employer] and stem cell healing of joints  Carpal tunnel- hand numb while holding phone    2/2025:  Jacqueline often falls off early  when he does wear them  Eating out a lot recently due to being busy, frequent business trip    1/17:  Exercise: trying to improve, in warmer weather likes riding bicycle    Hx:  Diet:  Does avoid processed foods and canned food, not much bread  BK: breakfast sandwich today- first time in a month; connolly once per week  Snacks: very rarely chips or pretzels, tries to avoid  Drinks: water, coffee    Secondary Prevention:  Statin? No  ACE-I/ARB? Yes  Aspirin? Yes    Pertinent PMH Review:  PMH of Pancreatitis: No  PMH of Retinopathy: No  PMH of Urinary Tract Infections: No  PMH of MTC/MEN2: No    Immunizations:  Influenza? No  COVID? Yes  Pneumonia? Yes  Shingles? No  Hepatitis B? No     HYPERTENSION ASSESSMENT  Medication Therapy  Current Medications:   Amlodipine 10/olmesartan 40 mg daily  Chlorthalidone 25 mg daily  Previous Medications: amlodipine 10 mg, hydrochlorothiazide 25 mg, lisinopril (cough)      Clarifications to above regimen: no repeat labs since medication adjustment  Adverse Effects: no concerns     Home BP Monitoring  BP Cuff Type: Wrist monitor  Cuff Validated? Yes, not very accurate wrist monitor- about 20 mmHg below true readings    Current home BP readings: does seem like readings are lower, more in 120s since starting CPAP  Previous home BP readings: last check 138 systolic    BP Readings from Last 3 Encounters:   07/17/25 147/81   06/24/25 122/62   05/07/25 145/74     Lifestyle  See above    Sodium Intake:  caffeine intake: 2 cups of coffee in the morning; cut back from 3 pots of coffee; very little added salt per the patient- avoids processed foods    Risk Assessment  Major Risk Factors Include:  Hypertension  Tobacco use  Diabetes mellitus  Age > 55 (men) or > 65 (women)  The 10-year ASCVD risk score (Eleuterio KO, et al., 2019) is: 30.3%    Values used to calculate the score:      Age: 60 years      Clincally relevant sex: Male      Is Non- : No      Diabetic: Yes       Tobacco smoker: No      Systolic Blood Pressure: 147 mmHg      Is BP treated: Yes      HDL Cholesterol: 33.3 mg/dL      Total Cholesterol: 188 mg/dL  Known target organ damage:  None    Secondary Prevention  On Statin?: No, previous rosuvastatin  Immunizations Needed: Annual Flu and Shingrix  Tobacco Use: vaping, occasional cigars     Medication Reconciliation:  Changed: no reported changes    Drug Interactions  The following drug interactions were noted:    Olmesartan use on potassium supplement- monitor potassium levels    Medication System Management  Patient's preferred pharmacy: [facility]  Adherence/Organization: reports taking medications as directed  Affordability/Accessibility: no concerns    Objective   Allergies   Allergen Reactions    Animal Dander Unknown    Cat Dander Unknown    Lisinopril Cough     Social History     Social History Narrative    Not on file      Medication Review  Current Outpatient Medications   Medication Instructions    amlodipine-olmesartan (Fernando) 10-40 mg tablet 1 tablet, oral, Daily    ascorbic acid (VITAMIN C) 500 mg, Daily    aspirin 81 mg, Daily    blood-glucose sensor (FreeStyle Jacqueline 3 Plus Sensor) device Apply sensor once every 15 days to monitor blood glucose    chlorthalidone (HYGROTON) 25 mg, oral, Daily    cholecalciferol (VITAMIN D-3) 25 mcg, Daily    flash glucose sensor kit (FreeStyle Jacqueline 2 Sensor) kit APPLY 1 SENSOR TO THE BACK OF THE ARM EVERY 14 DAYS TO MONITOR BLOOD SUGAR    glucosamine-chondroitin 500-400 mg tablet 1 tablet, 3 times daily    milk thistle 175 mg, Daily    Mounjaro 2.5 mg, subcutaneous, Weekly    NON FORMULARY Beet root    potassium chloride CR (Klor-Con) 10 mEq ER tablet 10 mEq, oral, Daily, Do not crush, chew, or split.    triamcinolone (Kenalog) 0.1 % cream Apply to affected area 1-2 times daily as needed. Avoid face and groin.    TURMERIC ORAL 1 tablet, Daily      Vitals  BP Readings from Last 2 Encounters:   07/17/25 147/81   06/24/25  122/62     BMI Readings from Last 1 Encounters:   07/17/25 29.05 kg/m²      Labs  A1C  Lab Results   Component Value Date    HGBA1C 6.9 (H) 04/30/2025    HGBA1C 5.7 (H) 07/29/2024    HGBA1C 5.6 11/27/2023     BMP  Lab Results   Component Value Date    CALCIUM 9.2 07/29/2024     07/29/2024    K 3.4 (L) 07/29/2024    CO2 28 07/29/2024     07/29/2024    BUN 15 07/29/2024    CREATININE 0.73 07/29/2024    EGFR >90 07/29/2024     LFTs  Lab Results   Component Value Date    ALT 19 07/29/2024    AST 21 07/29/2024    ALKPHOS 50 07/29/2024    BILITOT 0.7 07/29/2024     FLP  Lab Results   Component Value Date    TRIG 271 (H) 07/29/2024    CHOL 188 07/29/2024    LDLF 165 (H) 02/15/2023    LDLCALC 101 (H) 07/29/2024    HDL 33.3 07/29/2024     Urine Microalbumin  Lab Results   Component Value Date    MICROALBCREA 12 07/01/2025     Weight Management  Wt Readings from Last 3 Encounters:   07/17/25 81.6 kg (180 lb)   06/24/25 79.4 kg (175 lb)   05/07/25 83.9 kg (185 lb)      There is no height or weight on file to calculate BMI.     Assessment/Plan   Problem List Items Addressed This Visit       Hypertension    Improving control since starting CPAP, more 120s mmHg versus previous 130s mmHg. He does enjoy using CPAP and feels he gets better sleep as well. Given readings are improved to at or near BP goal, no changes to current medications.    CONTINUE:  Fernando 10/40 mg daily  Chlorthalidone 25 mg daily         Relevant Orders    Referral to Clinical Pharmacy    Type 2 diabetes mellitus with hyperglycemia (Multi) - Primary    Patient's goal A1c is < 7%.  Is pt at goal? Yes, 6.9%  Patient's SMBGs are controlled with 99% time in range and average 30-day of 106 mg/dL (estimated A1c control based on average reading is about 5.5%).     Rationale for plan: Kuldeep has noticed one low event after a day of increased activity. He is comfortable with current weight and has controlled glucose and BP readings. Given this, he is  interested in trying to decrease Mounjaro to the 2.5 mg weekly dose. Will follow-up next month to ensure all readings remain at goal with lower dose of Mounjaro.    Medication Changes:  DECREASE  Mounjaro to 2.5 mg weekly         Relevant Medications    tirzepatide (Mounjaro) 2.5 mg/0.5 mL pen injector    Other Relevant Orders    Referral to Clinical Pharmacy     Clinical Pharmacist follow-up: 9/16 8:40AM, Telehealth visit  The patient is not a chronic care management patient.    Continue all meds under the continuation of care with the referring provider and clinical pharmacy team.    Thank you,  Olimpia Bajwa, PharmD  Clinical Pharmacist  [phone]    Verbal consent to manage patient's drug therapy was obtained from the patient. They were informed they may decline to participate or withdraw from participation in pharmacy services at any time.

## 2025-08-05 NOTE — ASSESSMENT & PLAN NOTE
Patient's goal A1c is < 7%.  Is pt at goal? Yes, 6.9%  Patient's SMBGs are controlled with 99% time in range and average 30-day of 106 mg/dL (estimated A1c control based on average reading is about 5.5%).     Rationale for plan: Kuldeep has noticed one low event after a day of increased activity. He is comfortable with current weight and has controlled glucose and BP readings. Given this, he is interested in trying to decrease Mounjaro to the 2.5 mg weekly dose. Will follow-up next month to ensure all readings remain at goal with lower dose of Mounjaro.    Medication Changes:  DECREASE  Mounjaro to 2.5 mg weekly

## 2025-08-07 ENCOUNTER — PHARMACY VISIT (OUTPATIENT)
Dept: PHARMACY | Facility: CLINIC | Age: 60
End: 2025-08-07
Payer: COMMERCIAL

## 2025-08-11 ENCOUNTER — APPOINTMENT (OUTPATIENT)
Facility: CLINIC | Age: 60
End: 2025-08-11
Payer: COMMERCIAL

## 2025-08-12 DIAGNOSIS — E11.65 TYPE 2 DIABETES MELLITUS WITH HYPERGLYCEMIA, WITHOUT LONG-TERM CURRENT USE OF INSULIN: ICD-10-CM

## 2025-08-18 ENCOUNTER — APPOINTMENT (OUTPATIENT)
Dept: ORTHOPEDIC SURGERY | Facility: CLINIC | Age: 60
End: 2025-08-18
Payer: COMMERCIAL

## 2025-08-25 ENCOUNTER — OFFICE VISIT (OUTPATIENT)
Dept: ORTHOPEDIC SURGERY | Facility: CLINIC | Age: 60
End: 2025-08-25
Payer: COMMERCIAL

## 2025-08-25 DIAGNOSIS — G56.02 CARPAL TUNNEL SYNDROME OF LEFT WRIST: ICD-10-CM

## 2025-08-25 DIAGNOSIS — G56.01 CARPAL TUNNEL SYNDROME OF RIGHT WRIST: Primary | ICD-10-CM

## 2025-08-25 PROCEDURE — 99212 OFFICE O/P EST SF 10 MIN: CPT | Performed by: STUDENT IN AN ORGANIZED HEALTH CARE EDUCATION/TRAINING PROGRAM

## 2025-08-25 PROCEDURE — 99204 OFFICE O/P NEW MOD 45 MIN: CPT | Performed by: STUDENT IN AN ORGANIZED HEALTH CARE EDUCATION/TRAINING PROGRAM

## 2025-08-26 ENCOUNTER — APPOINTMENT (OUTPATIENT)
Facility: CLINIC | Age: 60
End: 2025-08-26
Payer: COMMERCIAL

## 2025-08-30 PROCEDURE — RXMED WILLOW AMBULATORY MEDICATION CHARGE

## 2025-09-02 PROCEDURE — RXMED WILLOW AMBULATORY MEDICATION CHARGE

## 2025-09-04 ENCOUNTER — PHARMACY VISIT (OUTPATIENT)
Dept: PHARMACY | Facility: CLINIC | Age: 60
End: 2025-09-04
Payer: COMMERCIAL

## 2025-09-16 ENCOUNTER — APPOINTMENT (OUTPATIENT)
Dept: PHARMACY | Facility: HOSPITAL | Age: 60
End: 2025-09-16
Payer: COMMERCIAL

## 2025-12-05 ENCOUNTER — APPOINTMENT (OUTPATIENT)
Dept: ORTHOPEDIC SURGERY | Facility: CLINIC | Age: 60
End: 2025-12-05
Payer: COMMERCIAL

## 2025-12-30 ENCOUNTER — APPOINTMENT (OUTPATIENT)
Dept: PRIMARY CARE | Facility: CLINIC | Age: 60
End: 2025-12-30
Payer: COMMERCIAL